# Patient Record
Sex: FEMALE | Race: WHITE | NOT HISPANIC OR LATINO | Employment: OTHER | URBAN - METROPOLITAN AREA
[De-identification: names, ages, dates, MRNs, and addresses within clinical notes are randomized per-mention and may not be internally consistent; named-entity substitution may affect disease eponyms.]

---

## 2018-03-14 ENCOUNTER — HOSPITAL ENCOUNTER (OUTPATIENT)
Dept: RADIOLOGY | Facility: OTHER | Age: 63
Discharge: HOME | End: 2018-03-14
Attending: OBSTETRICS & GYNECOLOGY
Payer: COMMERCIAL

## 2018-03-14 ENCOUNTER — OFFICE VISIT (OUTPATIENT)
Dept: OBSTETRICS AND GYNECOLOGY | Facility: CLINIC | Age: 63
End: 2018-03-14
Payer: COMMERCIAL

## 2018-03-14 VITALS
DIASTOLIC BLOOD PRESSURE: 82 MMHG | WEIGHT: 209.31 LBS | HEIGHT: 71 IN | SYSTOLIC BLOOD PRESSURE: 116 MMHG | BODY MASS INDEX: 29.3 KG/M2

## 2018-03-14 DIAGNOSIS — N95.0 POSTMENOPAUSAL BLEEDING: Primary | ICD-10-CM

## 2018-03-14 DIAGNOSIS — N95.0 POSTMENOPAUSAL BLEEDING: ICD-10-CM

## 2018-03-14 PROCEDURE — 99204 OFFICE O/P NEW MOD 45 MIN: CPT | Mod: S$GLB,,, | Performed by: OBSTETRICS & GYNECOLOGY

## 2018-03-14 PROCEDURE — 87624 HPV HI-RISK TYP POOLED RSLT: CPT

## 2018-03-14 PROCEDURE — 76856 US EXAM PELVIC COMPLETE: CPT | Mod: 26,,, | Performed by: INTERNAL MEDICINE

## 2018-03-14 PROCEDURE — 76830 TRANSVAGINAL US NON-OB: CPT | Mod: 26,,, | Performed by: INTERNAL MEDICINE

## 2018-03-14 PROCEDURE — 88175 CYTOPATH C/V AUTO FLUID REDO: CPT

## 2018-03-14 PROCEDURE — 76830 TRANSVAGINAL US NON-OB: CPT | Mod: TC

## 2018-03-14 RX ORDER — ESTRADIOL AND LEVONORGESTREL .045; .015 MG/D; MG/D
PATCH TRANSDERMAL WEEKLY
COMMUNITY
Start: 2018-03-11 | End: 2018-11-09 | Stop reason: SDUPTHER

## 2018-03-14 RX ORDER — TRAZODONE HYDROCHLORIDE 50 MG/1
TABLET ORAL NIGHTLY PRN
COMMUNITY
Start: 2018-03-13 | End: 2020-02-18 | Stop reason: SDUPTHER

## 2018-03-14 RX ORDER — LEVOTHYROXINE SODIUM 88 UG/1
TABLET ORAL
COMMUNITY
Start: 2018-03-13 | End: 2020-02-18 | Stop reason: SDUPTHER

## 2018-03-14 NOTE — PROGRESS NOTES
Subjective:       Patient ID: Sara David is a 62 y.o. female.    Chief Complaint:  Vaginal Bleeding      History of Present Illness:  HPI   61 y/o G0 postmenopausal female presents for evaluation of postmenopausal bleeding. Menopause occurred about 10 years ago. Reports that the bleeding occurred 5 nights ago and was heavy enough to bleed through a tampon/pad followed by a gush of clear fluid the following day. Has continued to spot since that time. Has been on climara patch since menopause. Does report a history of endometriosis, had one ovary removed. History of D&C for AUB in the  - was told she had polyps. Unsure when her last pap. One abnormal pap in the  - colpo was benign. Has not had intercourse for a while. Had the flu two weeks ago.     FH: Denies family history of breast, GYN or colon cancer.    GYN & OB History  No LMP recorded. Patient is postmenopausal.   Date of Last Pap: No result found    OB History    Para Term  AB Living   2       2     SAB TAB Ectopic Multiple Live Births                  # Outcome Date GA Lbr Brice/2nd Weight Sex Delivery Anes PTL Lv   2 AB            1 AB                   Review of Systems  Review of Systems   Constitutional: Negative for chills, diaphoresis, fatigue and fever.   Respiratory: Negative for cough and shortness of breath.    Cardiovascular: Negative for chest pain and palpitations.   Gastrointestinal: Negative for abdominal pain, constipation, diarrhea, nausea and vomiting.   Genitourinary: Positive for menstrual problem, vaginal discharge and postmenopausal bleeding. Negative for dyspareunia, pelvic pain, vaginal bleeding and vaginal pain.   Neurological: Negative for headaches.   Psychiatric/Behavioral: Negative for depression.           Objective:    Physical Exam:   Constitutional: She is oriented to person, place, and time. She appears well-developed and well-nourished. No distress.    HENT:   Head: Normocephalic and atraumatic.      Neck: Normal range of motion.     Pulmonary/Chest: Effort normal.          Genitourinary:   Genitourinary Comments: Normal external female genitalia; vagina atrophic with old blood in vault; cervix normal, no masses with mucus/old blood from os; uterus small mobile and nontender; no adnexal masses palpated.           Musculoskeletal: Normal range of motion and moves all extremeties.       Neurological: She is alert and oriented to person, place, and time.     Psychiatric: She has a normal mood and affect. Her behavior is normal. Judgment and thought content normal.          Assessment:        1. Postmenopausal bleeding             Plan:      Sara was seen today for vaginal bleeding.    Diagnoses and all orders for this visit:    Postmenopausal bleeding  - Pap/HPV testing done.   - Discussed possible causes of PMB including atrophy, benign polyp/fibroid, endometrial hyperplasia/malignancy.  - Recommended TVUS.   - Discussed will most likely need EMBx - offered today and patient declined.   -     Liquid-based pap smear, screening  -     HPV High Risk Genotypes, PCR  -     Cancel: US Pelvis Comp with Transvag NON-OB (xpd; Future  -     US Pelvis Comp with Transvag NON-OB (xpd; Future        Orders Placed This Encounter   Procedures    HPV High Risk Genotypes, PCR    US Pelvis Comp with Transvag NON-OB (xpd       Follow-up if symptoms worsen or fail to improve, for pending U/S results.        Meche Wynn MD  OB/GYN

## 2018-03-16 LAB
HPV16 AG SPEC QL: NEGATIVE
HPV16+18+H RISK 12 DNA CVX-IMP: NEGATIVE
HPV18 DNA SPEC QL NAA+PROBE: NEGATIVE

## 2018-03-19 ENCOUNTER — SURGICAL CONSULT (OUTPATIENT)
Dept: OBSTETRICS AND GYNECOLOGY | Facility: CLINIC | Age: 63
End: 2018-03-19
Payer: COMMERCIAL

## 2018-03-19 VITALS
HEIGHT: 71 IN | DIASTOLIC BLOOD PRESSURE: 80 MMHG | WEIGHT: 209.19 LBS | SYSTOLIC BLOOD PRESSURE: 115 MMHG | BODY MASS INDEX: 29.29 KG/M2

## 2018-03-19 DIAGNOSIS — N95.0 POSTMENOPAUSAL BLEEDING: Primary | ICD-10-CM

## 2018-03-19 LAB
B-HCG UR QL: NEGATIVE
CTP QC/QA: YES

## 2018-03-19 PROCEDURE — 99499 UNLISTED E&M SERVICE: CPT | Mod: S$GLB,,, | Performed by: OBSTETRICS & GYNECOLOGY

## 2018-03-19 PROCEDURE — 58100 BIOPSY OF UTERUS LINING: CPT | Mod: S$GLB,,, | Performed by: OBSTETRICS & GYNECOLOGY

## 2018-03-19 PROCEDURE — 88305 TISSUE EXAM BY PATHOLOGIST: CPT | Mod: 26,,,

## 2018-03-19 PROCEDURE — 88305 TISSUE EXAM BY PATHOLOGIST: CPT

## 2018-03-19 PROCEDURE — 81025 URINE PREGNANCY TEST: CPT | Mod: S$GLB,,, | Performed by: OBSTETRICS & GYNECOLOGY

## 2018-03-23 ENCOUNTER — TELEPHONE (OUTPATIENT)
Dept: OBSTETRICS AND GYNECOLOGY | Facility: CLINIC | Age: 63
End: 2018-03-23

## 2018-03-23 NOTE — TELEPHONE ENCOUNTER
----- Message from Veda Carrera sent at 3/23/2018  9:54 AM CDT -----  x_  1st Request  _  2nd Request  _  3rd Request        Who: carmelita    Why: pt. Stating she is still bleeding since procedure. Please call to discuss    What Number to Call Back:[phone]    When to Expect a call back: (Within 24 hours)

## 2018-03-23 NOTE — TELEPHONE ENCOUNTER
Told Patient that its normal to bleed after the EMBX procedure . Patient stated that she is not bleeding throught out pad within an hour. Patient is not experiencing dizziness . Patient spoke with

## 2018-03-27 DIAGNOSIS — N95.0 POSTMENOPAUSAL BLEEDING: Primary | ICD-10-CM

## 2018-04-05 ENCOUNTER — TELEPHONE (OUTPATIENT)
Dept: OBSTETRICS AND GYNECOLOGY | Facility: CLINIC | Age: 63
End: 2018-04-05

## 2018-04-05 NOTE — TELEPHONE ENCOUNTER
----- Message from Meche Wynn MD sent at 4/5/2018  2:30 PM CDT -----  Yes, if she is fine with the surgery on April 13, she needs preop and consent appt with me prior to that. Let me know if she needs to talk to me before. Thanks!  ----- Message -----  From: Harika Freitas MA  Sent: 4/5/2018   2:24 PM  To: Meche Wynn MD    Pt has not had pre op yet, would you like this scheduled?  ----- Message -----  From: Veda Carrera  Sent: 4/5/2018   1:13 PM  To: [facility] Staff      Patient name:carmelita      Reason:pt. Would like to speak with dr. Wynn regarding preop. Please call to discuss      Callback number:[phone]

## 2018-04-05 NOTE — TELEPHONE ENCOUNTER
Called pt, she answered the phone and hung up. Need to see if her surgery date is ok. Then need to schedule pre op and pre admit appt.

## 2018-04-06 ENCOUNTER — TELEPHONE (OUTPATIENT)
Dept: OBSTETRICS AND GYNECOLOGY | Facility: CLINIC | Age: 63
End: 2018-04-06

## 2018-04-06 NOTE — TELEPHONE ENCOUNTER
----- Message from Corrine Caruso sent at 4/6/2018 10:22 AM CDT -----  Contact: pt  x_ 1st Request  _ 2nd Request  _ 3rd Request    Who: pt    Why: is needing to schedule her surgery date    What Number to Call Back: [phone]    When to Expect a call back: (Before the end of the day)  -- if call after 3:00 call back will be tomorrow.

## 2018-04-06 NOTE — TELEPHONE ENCOUNTER
Attempted to contact the pt per Dr. Wynn to inquire if 4/13 will work as a Sx date. No answer, left VM message for the pt to call the clinic back.

## 2018-04-06 NOTE — TELEPHONE ENCOUNTER
Contacted the pt to inquire if she can have her Sx on 4/13. Pt stated that she could. Pt scheduled for pre-op with Dr. Wynn on 4/11 at 9:45AM and anesthesia on 4/11 at 2:30PM. Pt informed of the appointment locations, dates, and times. Pt informed that letter reminders will be sent out. Pt verbalized understanding. Letters sent out.    Pt agreed to 4/13 for Sx. Pre-op and anesthesia scheduled. Can you place the orders?

## 2018-04-11 ENCOUNTER — OFFICE VISIT (OUTPATIENT)
Dept: OBSTETRICS AND GYNECOLOGY | Facility: CLINIC | Age: 63
End: 2018-04-11
Payer: COMMERCIAL

## 2018-04-11 ENCOUNTER — HOSPITAL ENCOUNTER (OUTPATIENT)
Dept: PREADMISSION TESTING | Facility: OTHER | Age: 63
Discharge: HOME | End: 2018-04-11
Attending: OBSTETRICS & GYNECOLOGY
Payer: COMMERCIAL

## 2018-04-11 ENCOUNTER — ANESTHESIA EVENT (OUTPATIENT)
Dept: SURGERY | Facility: OTHER | Age: 63
End: 2018-04-11
Payer: COMMERCIAL

## 2018-04-11 VITALS
WEIGHT: 211 LBS | SYSTOLIC BLOOD PRESSURE: 121 MMHG | BODY MASS INDEX: 29.54 KG/M2 | TEMPERATURE: 99 F | HEIGHT: 71 IN | OXYGEN SATURATION: 97 % | HEART RATE: 90 BPM | DIASTOLIC BLOOD PRESSURE: 73 MMHG

## 2018-04-11 VITALS
BODY MASS INDEX: 29.56 KG/M2 | WEIGHT: 211.19 LBS | DIASTOLIC BLOOD PRESSURE: 73 MMHG | SYSTOLIC BLOOD PRESSURE: 121 MMHG | HEIGHT: 71 IN

## 2018-04-11 DIAGNOSIS — N95.0 POSTMENOPAUSAL BLEEDING: Primary | ICD-10-CM

## 2018-04-11 LAB
BASOPHILS # BLD AUTO: 0.03 K/UL
BASOPHILS NFR BLD: 0.4 %
DIFFERENTIAL METHOD: ABNORMAL
EOSINOPHIL # BLD AUTO: 0.2 K/UL
EOSINOPHIL NFR BLD: 2.5 %
ERYTHROCYTE [DISTWIDTH] IN BLOOD BY AUTOMATED COUNT: 13.7 %
HCT VFR BLD AUTO: 43.7 %
HGB BLD-MCNC: 14.6 G/DL
LYMPHOCYTES # BLD AUTO: 1.9 K/UL
LYMPHOCYTES NFR BLD: 24 %
MCH RBC QN AUTO: 31.9 PG
MCHC RBC AUTO-ENTMCNC: 33.4 G/DL
MCV RBC AUTO: 95 FL
MONOCYTES # BLD AUTO: 0.6 K/UL
MONOCYTES NFR BLD: 7.1 %
NEUTROPHILS # BLD AUTO: 5.2 K/UL
NEUTROPHILS NFR BLD: 65.4 %
PLATELET # BLD AUTO: 287 K/UL
PMV BLD AUTO: 9.2 FL
RBC # BLD AUTO: 4.58 M/UL
WBC # BLD AUTO: 7.93 K/UL

## 2018-04-11 PROCEDURE — 99499 UNLISTED E&M SERVICE: CPT | Mod: S$GLB,,, | Performed by: OBSTETRICS & GYNECOLOGY

## 2018-04-11 PROCEDURE — 85025 COMPLETE CBC W/AUTO DIFF WBC: CPT

## 2018-04-11 PROCEDURE — 36415 COLL VENOUS BLD VENIPUNCTURE: CPT

## 2018-04-11 RX ORDER — PREGABALIN 75 MG/1
150 CAPSULE ORAL
Status: DISCONTINUED | OUTPATIENT
Start: 2018-04-11 | End: 2018-04-12 | Stop reason: HOSPADM

## 2018-04-11 RX ORDER — LIDOCAINE HYDROCHLORIDE 10 MG/ML
0.5 INJECTION, SOLUTION EPIDURAL; INFILTRATION; INTRACAUDAL; PERINEURAL ONCE
Status: CANCELLED | OUTPATIENT
Start: 2018-04-11 | End: 2018-04-11

## 2018-04-11 RX ORDER — MIDAZOLAM HYDROCHLORIDE 5 MG/ML
2 INJECTION INTRAMUSCULAR; INTRAVENOUS
Status: CANCELLED | OUTPATIENT
Start: 2018-04-11 | End: 2018-04-11

## 2018-04-11 RX ORDER — CETIRIZINE HYDROCHLORIDE 10 MG/1
10 TABLET ORAL DAILY
COMMUNITY

## 2018-04-11 RX ORDER — SODIUM CHLORIDE, SODIUM LACTATE, POTASSIUM CHLORIDE, CALCIUM CHLORIDE 600; 310; 30; 20 MG/100ML; MG/100ML; MG/100ML; MG/100ML
INJECTION, SOLUTION INTRAVENOUS CONTINUOUS
Status: CANCELLED | OUTPATIENT
Start: 2018-04-11

## 2018-04-11 NOTE — PROGRESS NOTES
History & Physical  Gynecology      SUBJECTIVE:     Chief Complaint: Pre-op Exam       History of Present Illness:  61 y/o postmenopausal female presents for surgery workup for hysteroscopy/D&C scheduled for  for postmenopausal bleeding. Patient had continued bleeding for a few weeks after EMBx. Patient reports bleeding has now stopped. EMBx benign.     TVUS:  Transabdominal and transvaginal images were obtained.  The uterus measures 8.5 x 4.2 x 5.8 cm, within the normal range.  The endometrial stripe is 8 mm, abnormal.  The uterus demonstrates 3 fibroids.  Along the posterior left body of the uterus is a 1.4 x 1.1 x 1.5 cm subserosal fibroid.  There is a 2.6 x 2.4 x 2.6 cm subserosal fibroid along the left body of the uterus.  Finally, a subcentimeter intramural fibroid is present also along the posterior uterus.  There is some minimal fluid within the cervical canal.  There is a lobulated region of tissue projecting into the fluid .  This contains some internal flow.    Ovaries: The right ovary has a normal size and appearance and measures 2.3 x 2.3 x 1.2 cm.  The left ovary is not visualized.    Impression:  In this postmenopausal woman with bleeding the endometrial stripe is thickened measuring 8 mm.    There is some lobulated tissue within the cervical canal projecting into a small amount of fluid.  It contains some internal vascularity, raising possibility of a polyp.    FINAL PATHOLOGIC DIAGNOSIS  ENDOMETRIAL TISSUE WITH FOCAL GLANDULAR AND STROMAL BREAKDOWN.  NO MALIGNANCY IDENTIFIED.    Review of patient's allergies indicates:   Allergen Reactions    Iodinated contrast- oral and iv dye        Past Medical History:   Diagnosis Date    Endometriosis     Miscarriage     Thyroid disease      Past Surgical History:   Procedure Laterality Date    BACK SURGERY      DILATION AND CURETTAGE OF UTERUS      History of polyps    SALPINGOOPHORECTOMY      WISDOM TOOTH EXTRACTION       OB History       Para Term  AB Living    2       2      SAB TAB Ectopic Multiple Live Births                     Family History   Problem Relation Age of Onset    Hypertension Father     Breast cancer Sister     Bone cancer Sister     Lymphoma Maternal Aunt     Colon cancer Neg Hx     Diabetes Neg Hx     Miscarriages / Stillbirths Neg Hx     Stroke Neg Hx      Social History   Substance Use Topics    Smoking status: Never Smoker    Smokeless tobacco: Never Used    Alcohol use Yes      Comment: 2-3 times per week       Current Outpatient Prescriptions   Medication Sig    CLIMARA PRO 0.045-0.015 mg/24 hr Place onto the skin once a week.     levothyroxine (SYNTHROID) 88 MCG tablet before breakfast.     traZODone (DESYREL) 50 MG tablet nightly as needed.     cetirizine (ZYRTEC) 10 MG tablet Take 10 mg by mouth once daily.     No current facility-administered medications for this visit.          Review of Systems:  Review of Systems   Constitutional: Negative for chills, fatigue, fever and unexpected weight change.   Respiratory: Negative for cough, shortness of breath and wheezing.    Cardiovascular: Negative for chest pain and palpitations.   Gastrointestinal: Negative for abdominal pain, constipation, diarrhea, nausea and vomiting.   Genitourinary: Positive for postmenopausal bleeding. Negative for dyspareunia, hematuria, menorrhagia, pelvic pain, vaginal bleeding, vaginal discharge and vaginal pain.   Musculoskeletal: Negative for myalgias.   Neurological: Negative for syncope and headaches.   Hematological: Negative for adenopathy.   Psychiatric/Behavioral: The patient is not nervous/anxious.         OBJECTIVE:     Physical Exam:  Physical Exam   Constitutional: She is oriented to person, place, and time. She appears well-developed and well-nourished. No distress.   HENT:   Head: Normocephalic and atraumatic.   Neck: Normal range of motion.   Cardiovascular: Normal rate, regular rhythm and normal heart sounds.   Exam reveals no gallop and no friction rub.    No murmur heard.  Pulmonary/Chest: Effort normal and breath sounds normal. No respiratory distress. She has no wheezes. She has no rales.   Abdominal: Soft. She exhibits no distension and no mass. There is no tenderness. There is no rebound and no guarding. No hernia.   Genitourinary:   Genitourinary Comments: See prior notes     Musculoskeletal: Normal range of motion. She exhibits no edema.   Neurological: She is alert and oriented to person, place, and time.   Psychiatric: She has a normal mood and affect. Her behavior is normal. Judgment and thought content normal.         ASSESSMENT:       ICD-10-CM ICD-9-CM    1. Postmenopausal bleeding N95.0 627.1 Notify Physician/Vital Signs Parameters Urine output less than 0.5mL/kg/hr (with indwelling catheter) or 30 mL/hr (without indwelling catheter) or blood glucose greater than 200 mg/dL      Notify physician      Notify Physician - Potential Need of Opioid Reversal      Place in Outpatient      Vital signs      Pulse Oximetry Q4H      Up ad nicholas      Place JASON hose      Place sequential compression device      Diet NPO          Plan:      Sara was seen today for pre-op exam.    Diagnoses and all orders for this visit:    Postmenopausal bleeding  - To OR for Hscope/D&C on 4/13 - suspect polyp.   - Consents signed. Risks, benefits, alternatives discussed.  - Orders placed, to preop today.   -     Notify Physician/Vital Signs Parameters Urine output less than 0.5mL/kg/hr (with indwelling catheter) or 30 mL/hr (without indwelling catheter) or blood glucose greater than 200 mg/dL; Standing  -     Notify physician; Standing  -     Notify Physician - Potential Need of Opioid Reversal; Standing  -     Place in Outpatient; Standing  -     Vital signs; Standing  -     Pulse Oximetry Q4H; Standing  -     Up ad nicholas; Standing  -     Place JASON hose; Standing  -     Place sequential compression device; Standing  -     Diet NPO;  Standing    Other orders  -     IP VTE LOW RISK PATIENT; Standing    No orders of the defined types were placed in this encounter.      Follow-up in about 6 weeks (around 5/23/2018) for post op.    Counseling time: 15 minutes    Meche Wynn

## 2018-04-11 NOTE — ANESTHESIA PREPROCEDURE EVALUATION
04/11/2018  Sara David is a 62 y.o., female.    Anesthesia Evaluation    I have reviewed the Patient Summary Reports.    I have reviewed the Nursing Notes.   I have reviewed the Medications.     Review of Systems  Anesthesia Hx:  No problems with previous Anesthesia  History of prior surgery of interest to airway management or planning: Denies Family Hx of Anesthesia complications.   Denies Personal Hx of Anesthesia complications.   Social:  Non-Smoker    Hematology/Oncology:  Hematology Normal   Oncology Normal     EENT/Dental:EENT/Dental Normal   Cardiovascular:  Cardiovascular Normal     Pulmonary:  Pulmonary Normal    Renal/:  Renal/ Normal     Hepatic/GI:  Hepatic/GI Normal    Musculoskeletal:  Musculoskeletal Normal    Neurological:  Neurology Normal    Endocrine:   Hypothyroidism    Dermatological:  Skin Normal    Psych:  Psychiatric Normal           Physical Exam  General:  Well nourished    Airway/Jaw/Neck:  Airway Findings: Mouth Opening: Normal Tongue: Normal  Mallampati: I      Dental:  Dental Findings: molar caps        Mental Status:  Mental Status Findings:  Cooperative, Alert and Oriented         Anesthesia Plan  Type of Anesthesia, risks & benefits discussed:  Anesthesia Type:  general  Patient's Preference:   Intra-op Monitoring Plan:   Intra-op Monitoring Plan Comments:   Post Op Pain Control Plan:   Post Op Pain Control Plan Comments:   Induction:   IV  Beta Blocker:         Informed Consent: Patient understands risks and agrees with Anesthesia plan.  Questions answered. Anesthesia consent signed with patient.  ASA Score: 2     Day of Surgery Review of History & Physical:    H&P update referred to the surgeon.     Anesthesia Plan Notes: CBC today        Ready For Surgery From Anesthesia Perspective.

## 2018-04-11 NOTE — DISCHARGE INSTRUCTIONS
PRE-ADMIT TESTING -  [phone]    [address]  [place] [facility]          Your surgery has been scheduled at [facility]. We are pleased to have the opportunity to serve you. For Further Information please call [phone].    On the day of surgery please report to the Information Desk on the 1st floor.    · CONTACT YOUR PHYSICIAN'S OFFICE THE DAY PRIOR TO YOUR SURGERY TO OBTAIN YOUR ARRIVAL TIME.     · The evening before surgery do not eat anything after 9 p.m. ( this includes hard candy, chewing gum and mints).  You may only have GATORADE, POWERADE AND WATER  from 9 p.m. until you leave your home.   DO NOT DRINK ANY LIQUIDS ON THE WAY TO THE HOSPITAL.      SPECIAL MEDICATION INSTRUCTIONS: TAKE medications checked off by the Anesthesiologist on your Medication List.    Angiogram Patients: Take medications as instructed by your physician, including aspirin.     Surgery Patients:    If you take ASPIRIN - Your PHYSICIAN/SURGEON will need to inform you IF/OR when you need to stop taking aspirin prior to your surgery.     Do Not take any medications containing IBUPROFEN.  Do Not Wear any make-up or dark nail polish   (especially eye make-up) to surgery. If you come to surgery with makeup on you will be required to remove the makeup or nail polish.    Do not shave your surgical area at least 5 days prior to your surgery. The surgical prep will be performed at the hospital according to Infection Control regulations.    Leave all valuables at home.   Do Not wear any jewelry or watches, including any metal in body piercings.  Contact Lens must be removed before surgery. Either do not wear the contact lens or bring a case and solution for storage.  Please bring a container for eyeglasses or dentures as required.  Bring any paperwork your physician has provided, such as consent forms,  history and physicals, doctor's orders, etc.   Bring comfortable clothes that are loose fitting to wear upon  discharge. Take into consideration the type of surgery being performed.  Maintain your diet as advised per your physician the day prior to surgery.      Adequate rest the night before surgery is advised.   Park in the Parking lot behind the hospital or in the [facility] across the street from the parking lot. Parking is complimentary.  If you will be discharged the same day as your procedure, please arrange for a responsible adult to drive you home or to accompany you if traveling by taxi.   YOU WILL NOT BE PERMITTED TO DRIVE OR TO LEAVE THE HOSPITAL ALONE AFTER SURGERY.   It is strongly recommended that you arrange for someone to remain with you for the first 24 hrs following your surgery.       Thank you for your cooperation.  The Staff of [facility].        Bathing Instructions                                                                 Please shower the evening before and morning of your procedure with    ANTIBACTERIAL SOAP. ( DIAL, etc )  Concentrate on the surgical area   for at least 3 minutes and rinse completely. Dry off as usual.   Do not use any deodorant, powder, body lotions, perfume, after shave or    cologne.

## 2018-04-12 ENCOUNTER — TELEPHONE (OUTPATIENT)
Dept: OBSTETRICS AND GYNECOLOGY | Facility: CLINIC | Age: 63
End: 2018-04-12

## 2018-04-12 NOTE — TELEPHONE ENCOUNTER
Called patient in regards to surgery tomorrow. Told patient to be there 2 hours before surgery.Patient verbalized and understand.

## 2018-04-13 ENCOUNTER — HOSPITAL ENCOUNTER (OUTPATIENT)
Facility: OTHER | Age: 63
Discharge: HOME | End: 2018-04-13
Attending: OBSTETRICS & GYNECOLOGY | Admitting: OBSTETRICS & GYNECOLOGY
Payer: COMMERCIAL

## 2018-04-13 ENCOUNTER — ANESTHESIA (OUTPATIENT)
Dept: SURGERY | Facility: OTHER | Age: 63
End: 2018-04-13
Payer: COMMERCIAL

## 2018-04-13 ENCOUNTER — SURGERY (OUTPATIENT)
Age: 63
End: 2018-04-13

## 2018-04-13 VITALS
OXYGEN SATURATION: 100 % | HEART RATE: 69 BPM | RESPIRATION RATE: 18 BRPM | HEIGHT: 71 IN | TEMPERATURE: 98 F | WEIGHT: 211 LBS | DIASTOLIC BLOOD PRESSURE: 67 MMHG | SYSTOLIC BLOOD PRESSURE: 124 MMHG | BODY MASS INDEX: 29.54 KG/M2

## 2018-04-13 DIAGNOSIS — Z98.890 S/P D&C (STATUS POST DILATION AND CURETTAGE): Primary | ICD-10-CM

## 2018-04-13 DIAGNOSIS — N95.0 POSTMENOPAUSAL BLEEDING: ICD-10-CM

## 2018-04-13 PROCEDURE — 71000015 HC POSTOP RECOV 1ST HR: Performed by: OBSTETRICS & GYNECOLOGY

## 2018-04-13 PROCEDURE — 36000706: Performed by: OBSTETRICS & GYNECOLOGY

## 2018-04-13 PROCEDURE — 37000009 HC ANESTHESIA EA ADD 15 MINS: Performed by: OBSTETRICS & GYNECOLOGY

## 2018-04-13 PROCEDURE — 71000033 HC RECOVERY, INTIAL HOUR: Performed by: OBSTETRICS & GYNECOLOGY

## 2018-04-13 PROCEDURE — 63600175 PHARM REV CODE 636 W HCPCS: Performed by: ANESTHESIOLOGY

## 2018-04-13 PROCEDURE — 25000003 PHARM REV CODE 250: Performed by: NURSE ANESTHETIST, CERTIFIED REGISTERED

## 2018-04-13 PROCEDURE — 36000707: Performed by: OBSTETRICS & GYNECOLOGY

## 2018-04-13 PROCEDURE — 25000003 PHARM REV CODE 250: Performed by: ANESTHESIOLOGY

## 2018-04-13 PROCEDURE — 27201423 OPTIME MED/SURG SUP & DEVICES STERILE SUPPLY: Performed by: OBSTETRICS & GYNECOLOGY

## 2018-04-13 PROCEDURE — 58561 HYSTEROSCOPY REMOVE MYOMA: CPT | Mod: ,,, | Performed by: OBSTETRICS & GYNECOLOGY

## 2018-04-13 PROCEDURE — 63600175 PHARM REV CODE 636 W HCPCS: Performed by: NURSE ANESTHETIST, CERTIFIED REGISTERED

## 2018-04-13 PROCEDURE — 71000016 HC POSTOP RECOV ADDL HR: Performed by: OBSTETRICS & GYNECOLOGY

## 2018-04-13 PROCEDURE — 37000008 HC ANESTHESIA 1ST 15 MINUTES: Performed by: OBSTETRICS & GYNECOLOGY

## 2018-04-13 PROCEDURE — C1782 MORCELLATOR: HCPCS | Performed by: OBSTETRICS & GYNECOLOGY

## 2018-04-13 RX ORDER — KETOROLAC TROMETHAMINE 30 MG/ML
INJECTION, SOLUTION INTRAMUSCULAR; INTRAVENOUS
Status: DISCONTINUED | OUTPATIENT
Start: 2018-04-13 | End: 2018-04-13

## 2018-04-13 RX ORDER — FENTANYL CITRATE 50 UG/ML
INJECTION, SOLUTION INTRAMUSCULAR; INTRAVENOUS
Status: DISCONTINUED | OUTPATIENT
Start: 2018-04-13 | End: 2018-04-13

## 2018-04-13 RX ORDER — FENTANYL CITRATE 50 UG/ML
25 INJECTION, SOLUTION INTRAMUSCULAR; INTRAVENOUS EVERY 5 MIN PRN
Status: DISCONTINUED | OUTPATIENT
Start: 2018-04-13 | End: 2018-04-13 | Stop reason: HOSPADM

## 2018-04-13 RX ORDER — HYDROCODONE BITARTRATE AND ACETAMINOPHEN 5; 325 MG/1; MG/1
1 TABLET ORAL EVERY 4 HOURS PRN
Qty: 14 TABLET | Refills: 0 | Status: SHIPPED | OUTPATIENT
Start: 2018-04-13 | End: 2018-11-03 | Stop reason: ALTCHOICE

## 2018-04-13 RX ORDER — DIPHENHYDRAMINE HYDROCHLORIDE 50 MG/ML
25 INJECTION INTRAMUSCULAR; INTRAVENOUS EVERY 4 HOURS PRN
Status: DISCONTINUED | OUTPATIENT
Start: 2018-04-13 | End: 2018-04-13 | Stop reason: HOSPADM

## 2018-04-13 RX ORDER — SODIUM CHLORIDE, SODIUM LACTATE, POTASSIUM CHLORIDE, CALCIUM CHLORIDE 600; 310; 30; 20 MG/100ML; MG/100ML; MG/100ML; MG/100ML
INJECTION, SOLUTION INTRAVENOUS CONTINUOUS
Status: DISCONTINUED | OUTPATIENT
Start: 2018-04-13 | End: 2018-04-13 | Stop reason: HOSPADM

## 2018-04-13 RX ORDER — IBUPROFEN 800 MG/1
800 TABLET ORAL EVERY 8 HOURS PRN
Qty: 60 TABLET | Refills: 1 | Status: SHIPPED | OUTPATIENT
Start: 2018-04-13 | End: 2018-11-03 | Stop reason: ALTCHOICE

## 2018-04-13 RX ORDER — LIDOCAINE HCL/PF 100 MG/5ML
SYRINGE (ML) INTRAVENOUS
Status: DISCONTINUED | OUTPATIENT
Start: 2018-04-13 | End: 2018-04-13

## 2018-04-13 RX ORDER — ACETAMINOPHEN 10 MG/ML
INJECTION, SOLUTION INTRAVENOUS
Status: DISCONTINUED | OUTPATIENT
Start: 2018-04-13 | End: 2018-04-13

## 2018-04-13 RX ORDER — DEXAMETHASONE SODIUM PHOSPHATE 4 MG/ML
INJECTION, SOLUTION INTRA-ARTICULAR; INTRALESIONAL; INTRAMUSCULAR; INTRAVENOUS; SOFT TISSUE
Status: DISCONTINUED | OUTPATIENT
Start: 2018-04-13 | End: 2018-04-13

## 2018-04-13 RX ORDER — MIDAZOLAM HYDROCHLORIDE 1 MG/ML
INJECTION INTRAMUSCULAR; INTRAVENOUS
Status: DISCONTINUED | OUTPATIENT
Start: 2018-04-13 | End: 2018-04-13

## 2018-04-13 RX ORDER — ONDANSETRON 8 MG/1
8 TABLET, ORALLY DISINTEGRATING ORAL EVERY 8 HOURS PRN
Status: DISCONTINUED | OUTPATIENT
Start: 2018-04-13 | End: 2018-04-13 | Stop reason: HOSPADM

## 2018-04-13 RX ORDER — SODIUM CHLORIDE 0.9 % (FLUSH) 0.9 %
3 SYRINGE (ML) INJECTION
Status: DISCONTINUED | OUTPATIENT
Start: 2018-04-13 | End: 2018-04-13 | Stop reason: HOSPADM

## 2018-04-13 RX ORDER — LIDOCAINE HYDROCHLORIDE 10 MG/ML
0.5 INJECTION INFILTRATION; PERINEURAL ONCE
Status: DISCONTINUED | OUTPATIENT
Start: 2018-04-13 | End: 2018-04-13 | Stop reason: HOSPADM

## 2018-04-13 RX ORDER — IBUPROFEN 600 MG/1
600 TABLET ORAL EVERY 6 HOURS
Status: DISCONTINUED | OUTPATIENT
Start: 2018-04-13 | End: 2018-04-13 | Stop reason: HOSPADM

## 2018-04-13 RX ORDER — OXYCODONE HYDROCHLORIDE 5 MG/1
5 TABLET ORAL ONCE
Status: DISCONTINUED | OUTPATIENT
Start: 2018-04-13 | End: 2018-04-13 | Stop reason: HOSPADM

## 2018-04-13 RX ORDER — PROPOFOL 10 MG/ML
VIAL (ML) INTRAVENOUS
Status: DISCONTINUED | OUTPATIENT
Start: 2018-04-13 | End: 2018-04-13

## 2018-04-13 RX ORDER — OXYCODONE HYDROCHLORIDE 5 MG/1
5 TABLET ORAL
Status: DISCONTINUED | OUTPATIENT
Start: 2018-04-13 | End: 2018-04-13 | Stop reason: HOSPADM

## 2018-04-13 RX ORDER — MIDAZOLAM HYDROCHLORIDE 5 MG/ML
2 INJECTION INTRAMUSCULAR; INTRAVENOUS
Status: DISCONTINUED | OUTPATIENT
Start: 2018-04-13 | End: 2018-04-13 | Stop reason: HOSPADM

## 2018-04-13 RX ORDER — DIPHENHYDRAMINE HCL 25 MG
25 CAPSULE ORAL EVERY 4 HOURS PRN
Status: DISCONTINUED | OUTPATIENT
Start: 2018-04-13 | End: 2018-04-13 | Stop reason: HOSPADM

## 2018-04-13 RX ORDER — MEPERIDINE HYDROCHLORIDE 50 MG/ML
12.5 INJECTION INTRAMUSCULAR; INTRAVENOUS; SUBCUTANEOUS ONCE AS NEEDED
Status: DISCONTINUED | OUTPATIENT
Start: 2018-04-13 | End: 2018-04-13 | Stop reason: HOSPADM

## 2018-04-13 RX ORDER — ONDANSETRON 2 MG/ML
4 INJECTION INTRAMUSCULAR; INTRAVENOUS DAILY PRN
Status: DISCONTINUED | OUTPATIENT
Start: 2018-04-13 | End: 2018-04-13 | Stop reason: HOSPADM

## 2018-04-13 RX ORDER — ONDANSETRON 2 MG/ML
INJECTION INTRAMUSCULAR; INTRAVENOUS
Status: DISCONTINUED | OUTPATIENT
Start: 2018-04-13 | End: 2018-04-13

## 2018-04-13 RX ORDER — HYDROCODONE BITARTRATE AND ACETAMINOPHEN 5; 325 MG/1; MG/1
1 TABLET ORAL EVERY 4 HOURS PRN
Status: DISCONTINUED | OUTPATIENT
Start: 2018-04-13 | End: 2018-04-13 | Stop reason: HOSPADM

## 2018-04-13 RX ADMIN — DEXAMETHASONE SODIUM PHOSPHATE 8 MG: 4 INJECTION, SOLUTION INTRAMUSCULAR; INTRAVENOUS at 01:04

## 2018-04-13 RX ADMIN — KETOROLAC TROMETHAMINE 30 MG: 30 INJECTION, SOLUTION INTRAMUSCULAR; INTRAVENOUS at 02:04

## 2018-04-13 RX ADMIN — FENTANYL CITRATE 25 MCG: 50 INJECTION, SOLUTION INTRAMUSCULAR; INTRAVENOUS at 02:04

## 2018-04-13 RX ADMIN — LIDOCAINE HYDROCHLORIDE 75 MG: 20 INJECTION, SOLUTION INTRAVENOUS at 01:04

## 2018-04-13 RX ADMIN — CARBOXYMETHYLCELLULOSE SODIUM 2 DROP: 2.5 SOLUTION/ DROPS OPHTHALMIC at 01:04

## 2018-04-13 RX ADMIN — MIDAZOLAM HYDROCHLORIDE 2 MG: 1 INJECTION, SOLUTION INTRAMUSCULAR; INTRAVENOUS at 12:04

## 2018-04-13 RX ADMIN — SODIUM CHLORIDE, SODIUM LACTATE, POTASSIUM CHLORIDE, AND CALCIUM CHLORIDE: 600; 310; 30; 20 INJECTION, SOLUTION INTRAVENOUS at 12:04

## 2018-04-13 RX ADMIN — FENTANYL CITRATE 50 MCG: 50 INJECTION, SOLUTION INTRAMUSCULAR; INTRAVENOUS at 01:04

## 2018-04-13 RX ADMIN — PROPOFOL 30 MG: 10 INJECTION, EMULSION INTRAVENOUS at 01:04

## 2018-04-13 RX ADMIN — ONDANSETRON 4 MG: 2 INJECTION INTRAMUSCULAR; INTRAVENOUS at 01:04

## 2018-04-13 RX ADMIN — OXYCODONE HYDROCHLORIDE 5 MG: 5 TABLET ORAL at 02:04

## 2018-04-13 RX ADMIN — ACETAMINOPHEN 1000 MG: 10 INJECTION, SOLUTION INTRAVENOUS at 01:04

## 2018-04-13 RX ADMIN — PROPOFOL 150 MG: 10 INJECTION, EMULSION INTRAVENOUS at 01:04

## 2018-04-13 NOTE — ANESTHESIA POSTPROCEDURE EVALUATION
"Anesthesia Post Evaluation    Patient: Sara David    Procedure(s) Performed: Procedure(s) (LRB):  [id number] (N/A)    Final Anesthesia Type: general  Patient location during evaluation: PACU  Patient participation: Yes- Able to Participate  Level of consciousness: awake and alert  Post-procedure vital signs: reviewed and stable  Pain management: adequate  Airway patency: patent  PONV status at discharge: No PONV  Anesthetic complications: no      Cardiovascular status: blood pressure returned to baseline  Respiratory status: unassisted, spontaneous ventilation and room air  Hydration status: euvolemic  Follow-up not needed.        Visit Vitals  BP (!) 151/83   Pulse (!) 55   Temp 36.4 °C (97.6 °F) (Oral)   Resp 16   Ht 5' 11" (1.803 m)   Wt 95.7 kg (211 lb)   LMP  (LMP Unknown)   SpO2 99%   Breastfeeding? No   BMI 29.43 kg/m²       Pain/Caridad Score: Pain Assessment Performed: Yes (4/13/2018  2:51 PM)  Presence of Pain: complains of pain/discomfort (4/13/2018  2:51 PM)  Pain Rating Prior to Med Admin: 5 (4/13/2018  2:51 PM)  Pain Rating Post Med Admin: 0 (4/13/2018  2:39 PM)  Caridad Score: 10 (4/13/2018  2:45 PM)      "

## 2018-04-13 NOTE — BRIEF OP NOTE
[facility]  Brief Operative Note     SUMMARY     Surgery Date: 4/13/2018     Surgeon(s) and Role:     * Meche Wynn MD - Primary    Assisting Surgeon: aDmion Gill MD - PGY-1    Pre-op Diagnosis:  Postmenopausal bleeding [N95.0]    Post-op Diagnosis:  Post-Op Diagnosis Codes:     * Postmenopausal bleeding [N95.0]    Procedure(s) (LRB):  [id number] (N/A)    Anesthesia: General    Findings:   1. Uterus sounded to 8 cm  2. Cervix did NOT descend well with gentle traction on the single-tooth tenaculum   3. Cervical os dilated to 5 [place] by the Lee dilators  4. Hysteroscopic findings include: Uterine cavity with large fibroid-appearing mass projecting into the cavity from the anterior wall. Redundant endometrial tissue at bilateral cornua obstructing the view of the ostia bilaterally   5. Sharp Curettage performed and all specimens sent to pathology   6. Hemostasis obtained at the end of the procedure  7. Total Fluid: 860mL. Fluid deficit 50mL.    Estimated Blood Loss: Minimal         Specimens:   Specimen (12h ago through future)    Start     Ordered    04/13/18 1402  Specimen to Pathology - Surgery  Once     Comments:  Endometrial curettage      04/13/18 1402          Discharge Note    SUMMARY     Admit Date: 4/13/2018    Discharge Date and Time:  04/13/2018 2:31 PM    Hospital Course (synopsis of major diagnoses, care, treatment, and services provided during the course of the hospital stay):   Patient was admitted for the above mentioned procedure.  Procedure was performed without difficulty.  Please see operative report for further details.  She was transferred to recovery in stable condition and discharged home the same day.      Final Diagnosis: Post-Op Diagnosis Codes:     * Postmenopausal bleeding [N95.0]    Disposition: Home or Self Care    Follow Up/Patient Instructions:     Medications:  Reconciled Home Medications:      Medication List      START taking these  medications    hydrocodone-acetaminophen 5-325mg 5-325 mg per tablet  Commonly known as:  NORCO  Take 1 tablet by mouth every 4 (four) hours as needed for Pain.     ibuprofen 800 MG tablet  Commonly known as:  ADVIL,MOTRIN  Take 1 tablet (800 mg total) by mouth every 8 (eight) hours as needed for Pain.        CONTINUE taking these medications    cetirizine 10 MG tablet  Commonly known as:  ZYRTEC  Take 10 mg by mouth once daily.     CLIMARA PRO 0.045-0.015 mg/24 hr  Generic drug:  estradiol-levonorgestrel  Place onto the skin once a week.     levothyroxine 88 MCG tablet  Commonly known as:  SYNTHROID  before breakfast.     traZODone 50 MG tablet  Commonly known as:  DESYREL  nightly as needed.            Discharge Procedure Orders  Diet general     Activity as tolerated   Order Comments: Nothing in the vagina for 2 weeks - No douching, tampons, or sex.     Call MD for:  temperature >100.4     Call MD for:  severe uncontrolled pain     Call MD for:   Order Comments: Heavy vaginal bleeding, soaking though more than 1 heavy pad per hour.    You may expect some vaginal spotting or bleeding for the next 1-2 weeks.       Follow-up Information     Meche Wynn MD In 2 weeks.    Specialty:  Obstetrics and Gynecology  Why:  Post-Op Visit  Contact information:  [address] [facility] [address] [phone]

## 2018-04-13 NOTE — H&P (VIEW-ONLY)
History & Physical  Gynecology      SUBJECTIVE:     Chief Complaint: Pre-op Exam       History of Present Illness:  63 y/o postmenopausal female presents for surgery workup for hysteroscopy/D&C scheduled for  for postmenopausal bleeding. Patient had continued bleeding for a few weeks after EMBx. Patient reports bleeding has now stopped. EMBx benign.     TVUS:  Transabdominal and transvaginal images were obtained.  The uterus measures 8.5 x 4.2 x 5.8 cm, within the normal range.  The endometrial stripe is 8 mm, abnormal.  The uterus demonstrates 3 fibroids.  Along the posterior left body of the uterus is a 1.4 x 1.1 x 1.5 cm subserosal fibroid.  There is a 2.6 x 2.4 x 2.6 cm subserosal fibroid along the left body of the uterus.  Finally, a subcentimeter intramural fibroid is present also along the posterior uterus.  There is some minimal fluid within the cervical canal.  There is a lobulated region of tissue projecting into the fluid .  This contains some internal flow.    Ovaries: The right ovary has a normal size and appearance and measures 2.3 x 2.3 x 1.2 cm.  The left ovary is not visualized.    Impression:  In this postmenopausal woman with bleeding the endometrial stripe is thickened measuring 8 mm.    There is some lobulated tissue within the cervical canal projecting into a small amount of fluid.  It contains some internal vascularity, raising possibility of a polyp.    FINAL PATHOLOGIC DIAGNOSIS  ENDOMETRIAL TISSUE WITH FOCAL GLANDULAR AND STROMAL BREAKDOWN.  NO MALIGNANCY IDENTIFIED.    Review of patient's allergies indicates:   Allergen Reactions    Iodinated contrast- oral and iv dye        Past Medical History:   Diagnosis Date    Endometriosis     Miscarriage     Thyroid disease      Past Surgical History:   Procedure Laterality Date    BACK SURGERY      DILATION AND CURETTAGE OF UTERUS      History of polyps    SALPINGOOPHORECTOMY      WISDOM TOOTH EXTRACTION       OB History       Para Term  AB Living    2       2      SAB TAB Ectopic Multiple Live Births                     Family History   Problem Relation Age of Onset    Hypertension Father     Breast cancer Sister     Bone cancer Sister     Lymphoma Maternal Aunt     Colon cancer Neg Hx     Diabetes Neg Hx     Miscarriages / Stillbirths Neg Hx     Stroke Neg Hx      Social History   Substance Use Topics    Smoking status: Never Smoker    Smokeless tobacco: Never Used    Alcohol use Yes      Comment: 2-3 times per week       Current Outpatient Prescriptions   Medication Sig    CLIMARA PRO 0.045-0.015 mg/24 hr Place onto the skin once a week.     levothyroxine (SYNTHROID) 88 MCG tablet before breakfast.     traZODone (DESYREL) 50 MG tablet nightly as needed.     cetirizine (ZYRTEC) 10 MG tablet Take 10 mg by mouth once daily.     No current facility-administered medications for this visit.          Review of Systems:  Review of Systems   Constitutional: Negative for chills, fatigue, fever and unexpected weight change.   Respiratory: Negative for cough, shortness of breath and wheezing.    Cardiovascular: Negative for chest pain and palpitations.   Gastrointestinal: Negative for abdominal pain, constipation, diarrhea, nausea and vomiting.   Genitourinary: Positive for postmenopausal bleeding. Negative for dyspareunia, hematuria, menorrhagia, pelvic pain, vaginal bleeding, vaginal discharge and vaginal pain.   Musculoskeletal: Negative for myalgias.   Neurological: Negative for syncope and headaches.   Hematological: Negative for adenopathy.   Psychiatric/Behavioral: The patient is not nervous/anxious.         OBJECTIVE:     Physical Exam:  Physical Exam   Constitutional: She is oriented to person, place, and time. She appears well-developed and well-nourished. No distress.   HENT:   Head: Normocephalic and atraumatic.   Neck: Normal range of motion.   Cardiovascular: Normal rate, regular rhythm and normal heart sounds.   Exam reveals no gallop and no friction rub.    No murmur heard.  Pulmonary/Chest: Effort normal and breath sounds normal. No respiratory distress. She has no wheezes. She has no rales.   Abdominal: Soft. She exhibits no distension and no mass. There is no tenderness. There is no rebound and no guarding. No hernia.   Genitourinary:   Genitourinary Comments: See prior notes     Musculoskeletal: Normal range of motion. She exhibits no edema.   Neurological: She is alert and oriented to person, place, and time.   Psychiatric: She has a normal mood and affect. Her behavior is normal. Judgment and thought content normal.         ASSESSMENT:       ICD-10-CM ICD-9-CM    1. Postmenopausal bleeding N95.0 627.1 Notify Physician/Vital Signs Parameters Urine output less than 0.5mL/kg/hr (with indwelling catheter) or 30 mL/hr (without indwelling catheter) or blood glucose greater than 200 mg/dL      Notify physician      Notify Physician - Potential Need of Opioid Reversal      Place in Outpatient      Vital signs      Pulse Oximetry Q4H      Up ad nicholas      Place JASON hose      Place sequential compression device      Diet NPO          Plan:      Sara was seen today for pre-op exam.    Diagnoses and all orders for this visit:    Postmenopausal bleeding  - To OR for Hscope/D&C on 4/13 - suspect polyp.   - Consents signed. Risks, benefits, alternatives discussed.  - Orders placed, to preop today.   -     Notify Physician/Vital Signs Parameters Urine output less than 0.5mL/kg/hr (with indwelling catheter) or 30 mL/hr (without indwelling catheter) or blood glucose greater than 200 mg/dL; Standing  -     Notify physician; Standing  -     Notify Physician - Potential Need of Opioid Reversal; Standing  -     Place in Outpatient; Standing  -     Vital signs; Standing  -     Pulse Oximetry Q4H; Standing  -     Up ad nicholas; Standing  -     Place JASON hose; Standing  -     Place sequential compression device; Standing  -     Diet NPO;  Standing    Other orders  -     IP VTE LOW RISK PATIENT; Standing    No orders of the defined types were placed in this encounter.      Follow-up in about 6 weeks (around 5/23/2018) for post op.    Counseling time: 15 minutes    Meche Wynn

## 2018-04-13 NOTE — DISCHARGE INSTRUCTIONS
Anesthesia: After Your Surgery  Youve just had surgery. During surgery, you received medication called anesthesia to keep you comfortable and pain-free. After surgery, you may experience some pain or nausea. This is common. Here are some tips for feeling better and recovering after surgery.    Going home  Your doctor or nurse will show you how to take care of yourself when you go home. He or she will also answer your questions. Have an adult family member or friend drive you home. For the first 24 hours after your surgery:  · Do not drive or use heavy equipment.  · Do not make important decisions or sign legal documents.  · Avoid alcohol.  · Have someone stay with you, if needed. He or she can watch for problems and help keep you safe.  Be sure to keep all follow-up appointments with your doctor. And rest after your procedure for as long as your doctor tells you to.    Coping with pain  If you have pain after surgery, pain medication will help you feel better. Take it as directed, before pain becomes severe. Also, ask your doctor or pharmacist about other ways to control pain, such as with heat, ice, and relaxation. And follow any other instructions your surgeon or nurse gives you.    Tips for taking pain medication  To get the best relief possible, remember these points:  · Pain medications can upset your stomach. Taking them with a little food may help.  · Most pain relievers taken by mouth need at least 20 to 30 minutes to take effect.  · Taking medication on a schedule can help you remember to take it. Try to time your medication so that you can take it before beginning an activity, such as dressing, walking, or sitting down for dinner.  · Constipation is a common side effect of pain medications. Contact your doctor before taking any medications like laxatives or stool softeners to help relieve constipation. Also ask about any dietary restrictions, because drinking lots of fluids and eating foods like fruits  and vegetables that are high in fiber can also help. Remember, dont take laxatives unless your surgeon has prescribed them.  · Mixing alcohol and pain medication can cause dizziness and slow your breathing. It can even be fatal. Dont drink alcohol while taking pain medication.  · Pain medication can slow your reflexes. Dont drive or operate machinery while taking pain medication.  If your health care provider tells you to take acetaminophen to help relieve your pain, ask him or her how much you are supposed to take each day. (Acetaminophen is the generic name for Tylenol and other brand-name pain relievers.) Acetaminophen or other pain relievers may interact with your prescription medicines or other over-the-counter (OTC) drugs. Some prescription medications contain acetaminophen along with other active ingredients. Using both prescription and OTC acetaminophen for pain can cause you to overdose. The FDA recommends that you read the labels on your OTC medications carefully. This will help you to clearly understand the list of active ingredients, dosing instructions, and any warnings. It may also help you avoid taking too much acetaminophen. If you have questions or don't understand the information, ask your pharmacist or health care provider to explain it to you before you take the OTC medication.    Managing nausea  Some people have an upset stomach after surgery. This is often due to anesthesia, pain, pain medications, or the stress of surgery. The following tips will help you manage nausea and get good nutrition as you recover. If you were on a special diet before surgery, ask your doctor if you should follow it during recovery. These tips may help:  · Dont push yourself to eat. Your body will tell you when to eat and how much.  · Start off with clear liquids and soup. They are easier to digest.  · Progress to semi-solid foods (mashed potatoes, applesauce, and gelatin) as you feel ready.  · Slowly move to  solid foods. Dont eat fatty, rich, or spicy foods at first.  · Dont force yourself to have three large meals a day. Instead, eat smaller amounts more often.  · Take pain medications with a small amount of solid food, such as crackers or toast to avoid nausea.      Call your surgeon if  · You still have pain an hour after taking medication (it may not be strong enough).  · You feel too sleepy, dizzy, or groggy (medication may be too strong).  · You have side effects like nausea, vomiting, or skin changes (rash, itching, or hives).   © 7175-5394 [employer]. [address] [place] [address], [place] [address]. All rights reserved. This information is not intended as a substitute for professional medical care. Always follow your healthcare professional's instructions.                Home Care Instruction D&C Hysteroscopy             ACTIVITY LEVEL:  If you received sedation and/or an anesthetic, you may feel sleepy for several hours. Rest until you feel more  awake. Gradually resume your normal activities.    DIET:  At home, continue with liquids. If there is no nausea, you may eat a soft diet and gradually resume a regular diet.    BATHING:  You may shower  as desired in one day.  You should avoid tub baths, hot tubs and swimming pools until seen by your physician for a post-op follow up.    CARE:  You can expect watery or bloody vaginal discharge for several days. Do not use tampons, please only use pads. Sexual activity is restricted as advised by your doctor.    MEDICATIONS:  You will receive instructions for any pain and/or antibiotic prescriptions. Pain medication should be taken only if needed and as directed. Antibiotics, if ordered, should be taken as directed until the entire prescription is completed.    ADDITIONAL INFORMATION:  __________________________________________________________________________________________  WHEN TO CALL THE DOCTOR:   For any heavy vaginal bleeding   Fever over 101°F  (38.4°C)   Any lower abdominal pain not relieved by the pain mediation    ____________________________________________________________________________________    .

## 2018-04-13 NOTE — INTERVAL H&P NOTE
The patient has been examined and the H&P has been reviewed:    I concur with the findings and no changes have occurred since H&P was written.    Anesthesia/Surgery risks, benefits and alternative options discussed and understood by patient/family.          Active Hospital Problems    Diagnosis  POA    Postmenopausal bleeding [N95.0]  Yes      Resolved Hospital Problems    Diagnosis Date Resolved POA   No resolved problems to display.

## 2018-04-13 NOTE — OP NOTE
Operative Report    Procedure: Dilation and Curettage, Hysteroscopy    Date of Surgery 04/13/2018    Surgeon: Meche Wynn MD - Primary    Assistant: Dr. Damion Gill, PGY-1    Pre-Op Diagnosis: Postmenopausal bleeding [N95.0]    Post-op Diagnosis:  Same    EBL: Minimal     IVF: 500mL    UOP: 100mL     Specimens:          Specimen (12h ago through future)     Start     Ordered     04/13/18 1402   Specimen to Pathology - Surgery  Once     Comments:  Endometrial curettage       04/13/18 1402     Findings:   1. Uterus sounded to 8 cm  2. Cervix did NOT descend well with gentle traction on the single-tooth tenaculum   3. Cervical os dilated to 5 [place] by the Lee dilators  4. Hysteroscopic findings include: Uterine cavity with large fibroid-appearing mass projecting into the cavity from the anterior wall. Redundant endometrial tissue at bilateral cornua obstructing the view of the ostia bilaterally   5. Sharp Curettage performed and all specimens sent to pathology   6. Hemostasis obtained at the end of the procedure  7. Total Fluid: 860mL. Fluid deficit 50mL.    Procedure in Detail:  The patient was taken to the Operating Room where general anesthesia was obtained. She was then was placed in the dorsal lithotomy position with her legs in the yellowfin stirrups. The patient was then prepped and draped in the normal sterile fashion. The weighted speculum was placed in the posterior aspect of the vagina and the right angle retractor was placed in the anterior aspect of the vagina. The cervix was visualized and a single-tooth tenaculum was used to grasp the posterior aspect of the cervix. The cervix did not descend well with gentle traction on the single-tooth tenaculum. The uterus was sounded to 8 cm with the uterine sound, and Lee dilators were used to accommodate the 5-[place]an hysteroscope. The hysteroscope was then inserted into the cervical os and and avanced through the cervical canal until the uterine cavity  was directly visualized. Hysteroscopic findings include: Uterine cavity with large fibroid-appearing mass projecting into the cavity from the anterior wall. Redundant endometrial tissue at bilateral cornua obstructing the view of the ostia bilaterally. The TruClear device was inserted into the scope and used to shave down the anterior mass, as well as to sample the endometrium from the posterior wall. The hysteroscope was then removed and sharp currettage of the uterus was performed. All specimens were sent to pathology for evaluation. The single tooth tenaculum was then removed and the cervix was made hemostatic with pressure.  Sponge and lap counts were correct x 2.  The patient tolerated the procedure well and was taken to recovery in stable condition.    Damion Gill M.D.  Obstetrics & Gynecology  PGY-1

## 2018-04-13 NOTE — PLAN OF CARE
Sara David has met all discharge criteria from Phase II. Vital Signs are stable, ambulating  without difficulty.Pain is now under control and tolerable for the pt. Pain score 3 at this time.  Discharge instructions given, patient verbalized understanding. Discharged from facility via wheelchair in stable condition.

## 2018-04-16 PROCEDURE — 88305 TISSUE EXAM BY PATHOLOGIST: CPT | Mod: 26,,, | Performed by: PATHOLOGY

## 2018-04-16 PROCEDURE — 88305 TISSUE EXAM BY PATHOLOGIST: CPT | Performed by: PATHOLOGY

## 2018-04-16 NOTE — PROCEDURES
Endometrial biopsy  Date/Time: 3/19/2018 7:01 PM  Performed by: PRITI WYNN.  Authorized by: PRITI WYNN.   Preparation: Patient was prepped and draped in the usual sterile fashion.  Local anesthesia used: no    Anesthesia:  Local anesthesia used: no    Sedation:  Patient sedated: no  Patient tolerance: Patient tolerated the procedure well with no immediate complications        PROCEDURE: Endometrial biopsy    INDICATION: postmenopausal bleeding    PATIENT CONSENT:      PRE ENDOMETRIAL BIOPSY COUNSELING:   The patient was informed of the risk of bleeding, infection, uterine perforation and  pain and that the test will rule-out endometrial cancer with accuracy greater than   95%. She was counseled on the alternatives to endometrial biopsy.  All of her   questions were answered.  Patient gives written consent    ANESTHESIA: None    Labs: UPT performed prior to the procedure was negative.    PROCEDURE NOTE:  The cervix was visualized with a speculum and swabbed with Betadinex3.  The anterior lip of the cervix was grasped with a single toothed tenaculum, and a sterile endometrial pipelle was inserted into the uterus to a sound length of 7 cm. 3 passes were made with the pipelle and slight amount of tissue was obtained. The specimen was placed in formalin and sent to Pathology for evaluation.     COMPLICATIONS: None    DISPOSITION: The patient tolerated the above procedure well.      POST ENDOMETRIAL BIOPSY COUNSELING:  - Manage post biopsy cramping with NSAIDs or Tylenol.  - Expect spotting or light bleeding for a few days.  - Report bleeding heavier than a period, fever > 101.0 F, worsening pain or a foul  smelling vaginal discharge.      Priti Wynn MD

## 2018-04-18 ENCOUNTER — TELEPHONE (OUTPATIENT)
Dept: OBSTETRICS AND GYNECOLOGY | Facility: CLINIC | Age: 63
End: 2018-04-18

## 2018-04-18 NOTE — TELEPHONE ENCOUNTER
Called patient with pathology report - benign. Patient states that she is doing well. Having some light discharge, no bleeding. Patient expressed understanding. Patient to schedule 4-6 week postop visit.

## 2018-08-23 ENCOUNTER — DOCUMENTATION ONLY (OUTPATIENT)
Dept: INTERNAL MEDICINE | Facility: CLINIC | Age: 63
End: 2018-08-23

## 2018-08-23 ENCOUNTER — TELEPHONE (OUTPATIENT)
Dept: INTERNAL MEDICINE | Facility: CLINIC | Age: 63
End: 2018-08-23

## 2018-08-23 NOTE — TELEPHONE ENCOUNTER
----- Message from Nena Leon sent at 8/23/2018  2:20 PM CDT -----  Contact: Self  Pt is a NP looking to establish care.   did not see Dr. Coelho's name on the list as accepting new pts, but the pt is requesting Staff contact her for discussion and possible scheduling of an appt.    She can be reached at [phone].    Thank you.

## 2018-11-03 ENCOUNTER — IMMUNIZATION (OUTPATIENT)
Dept: INTERNAL MEDICINE | Facility: CLINIC | Age: 63
End: 2018-11-03
Payer: MEDICARE

## 2018-11-03 ENCOUNTER — OFFICE VISIT (OUTPATIENT)
Dept: INTERNAL MEDICINE | Facility: CLINIC | Age: 63
End: 2018-11-03
Payer: MEDICARE

## 2018-11-03 VITALS
BODY MASS INDEX: 29.42 KG/M2 | DIASTOLIC BLOOD PRESSURE: 68 MMHG | WEIGHT: 210.13 LBS | SYSTOLIC BLOOD PRESSURE: 102 MMHG | TEMPERATURE: 98 F | OXYGEN SATURATION: 97 % | HEART RATE: 77 BPM | HEIGHT: 71 IN

## 2018-11-03 DIAGNOSIS — Z00.00 ENCOUNTER FOR HEALTH MAINTENANCE EXAMINATION: ICD-10-CM

## 2018-11-03 DIAGNOSIS — E03.9 HYPOTHYROIDISM, UNSPECIFIED TYPE: ICD-10-CM

## 2018-11-03 DIAGNOSIS — G47.9 SLEEP DIFFICULTIES: ICD-10-CM

## 2018-11-03 DIAGNOSIS — R21 RASH: ICD-10-CM

## 2018-11-03 DIAGNOSIS — Z85.820 HISTORY OF MELANOMA: ICD-10-CM

## 2018-11-03 DIAGNOSIS — Z82.62 FAMILY HISTORY OF OSTEOPOROSIS: ICD-10-CM

## 2018-11-03 DIAGNOSIS — N95.1 HOT FLASHES DUE TO MENOPAUSE: Primary | ICD-10-CM

## 2018-11-03 DIAGNOSIS — Z13.6 ENCOUNTER FOR SCREENING FOR CARDIOVASCULAR DISORDERS: ICD-10-CM

## 2018-11-03 DIAGNOSIS — Z91.09 ENVIRONMENTAL ALLERGIES: ICD-10-CM

## 2018-11-03 PROCEDURE — 99999 PR PBB SHADOW E&M-EST. PATIENT-LVL III: CPT | Mod: PBBFAC,,, | Performed by: INTERNAL MEDICINE

## 2018-11-03 PROCEDURE — 99203 OFFICE O/P NEW LOW 30 MIN: CPT | Mod: 25,S$GLB,, | Performed by: INTERNAL MEDICINE

## 2018-11-03 PROCEDURE — 3008F BODY MASS INDEX DOCD: CPT | Mod: CPTII,S$GLB,, | Performed by: INTERNAL MEDICINE

## 2018-11-03 PROCEDURE — 1101F PT FALLS ASSESS-DOCD LE1/YR: CPT | Mod: CPTII,S$GLB,, | Performed by: INTERNAL MEDICINE

## 2018-11-03 PROCEDURE — G0008 ADMIN INFLUENZA VIRUS VAC: HCPCS | Mod: S$GLB,,, | Performed by: INTERNAL MEDICINE

## 2018-11-03 PROCEDURE — 90662 IIV NO PRSV INCREASED AG IM: CPT | Mod: S$GLB,,, | Performed by: INTERNAL MEDICINE

## 2018-11-03 NOTE — PROGRESS NOTES
Subjective:       Patient ID: Sara David is a 63 y.o. female.    Chief Complaint: Establish Care    HPI  64 y/o woman with hypothyroidism, hot flashes, h/o melanoma, allergies here to establish care. Has previously followed with Dr Morelos at [facility]; transferring due to insurance change.    Menopausal symptoms, hot flashes - Previously on climara patch, this stopped being covered when she switched to Medicare so she stopped this around 9/2018.  Started on HRT around 2004-05 for severe frequent hot flashes that interfered with sleep, felt this was very effective, doesn't especially want to stop using this.  Waking up at least every hour with hot flashes - doesn't feel that current level of symptoms is tolerable. Hasn't yet checked in with Gyn about this.  Mentions that her mother remained on HRT into her late 70s.    Hypothyroidism - diagnosed in her 40s, noted to have enlarged thyroid, doesn't think Hashimoto's, reports having had FNA biopsy which was benign. Uses generic levothyroxine, stable on 88mcg, hasn't had labs in >1 year.  Gradual weight gain, no temperature intolerance other than the hot flashes, no GI issues.    Sleep problems - has taken trazodone nightly as needed for years, +sometimes also advil PM    Taking cetirizine for sinuses / allergies, notes that if she doesn't take this daily she breaks out in a rash, has had this for years.   Does tend to also get a rash in the winter when weather is more dry - generally sensitive skin.    H/o melanoma in 1990s on back, then on upper L arm 5/2018, follows with Dr Parker for dermatology.     Reports bone scan done recently for screening for osteoporosis.    Review of Systems   Constitutional: Negative for activity change and fever.   HENT:        No acute issues   Eyes: Negative for visual disturbance.   Respiratory: Negative for cough and shortness of breath.    Cardiovascular: Negative.  Negative for chest pain and leg swelling.   Gastrointestinal:  "Negative.  Negative for abdominal pain.   Endocrine:        Hot flashes as noted   Genitourinary: Negative.    Musculoskeletal: Negative for gait problem and joint swelling.   Skin: Positive for rash (sometimes).   Neurological: Negative for weakness and numbness.   Psychiatric/Behavioral: Negative.          Past Medical History:   Diagnosis Date    Endometriosis     Hypothyroidism     dx in her 40s, enlarged thyroid, did have FNA of thyroid nodule which was benign    Melanoma 1990, 2018 1990s - on back; 2018 - L upper arm    Miscarriage      Past Surgical History:   Procedure Laterality Date    BACK SURGERY      DILATION AND CURETTAGE OF UTERUS      History of polyps    FNA thyroid      in her 40s    [id number] N/A 4/13/2018    [facility]    MALIGNANT SKIN LESION EXCISION      1990s, 2018    SALPINGOOPHORECTOMY      WISDOM TOOTH EXTRACTION       Family History   Problem Relation Age of Onset    Hypertension Father     Breast cancer Sister     Bone cancer Sister     Lymphoma Maternal Aunt     Colon cancer Neg Hx     Diabetes Neg Hx     Miscarriages / Stillbirths Neg Hx     Stroke Neg Hx        Social History     Tobacco Use    Smoking status: Never Smoker    Smokeless tobacco: Never Used   Substance Use Topics    Alcohol use: Yes     Comment: 2-3 times per week    Drug use: Yes     Types: Marijuana       Medications and allergies reviewed.     Objective:          Vitals:    11/03/18 1059   BP: 102/68   BP Location: Left arm   Patient Position: Sitting   Pulse: 77   Temp: 98.1 °F (36.7 °C)   TempSrc: Oral   SpO2: 97%   Weight: 95.3 kg (210 lb 1.6 oz)   Height: 5' 11" (1.803 m)     Body mass index is 29.3 kg/m².  Physical Exam   Constitutional: She is oriented to person, place, and time. She appears well-developed and well-nourished. No distress.   HENT:   Head: Normocephalic and atraumatic.   Mouth/Throat: Oropharynx is clear and moist. "   Eyes: Conjunctivae and EOM are normal. Pupils are equal, round, and reactive to light. No scleral icterus.   Neck: Neck supple. Thyromegaly: possible mild thyromegaly, no firm nodules.   Cardiovascular: Normal rate, regular rhythm and normal heart sounds.   No murmur heard.  Pulmonary/Chest: Effort normal and breath sounds normal. No respiratory distress.   Abdominal: Soft. Bowel sounds are normal. She exhibits no distension. There is no tenderness.   Musculoskeletal: She exhibits no edema or tenderness.   Lymphadenopathy:     She has no cervical adenopathy.   Neurological: She is alert and oriented to person, place, and time. No cranial nerve deficit. Gait normal.   Skin: Skin is warm and dry. No rash noted.   Well-healed surgical scar L upper arm   Psychiatric: She has a normal mood and affect.   Vitals reviewed.      Lab Results   Component Value Date    WBC 7.93 04/11/2018    HGB 14.6 04/11/2018    HCT 43.7 04/11/2018     04/11/2018       Assessment:       1. Hot flashes due to menopause    2. Hypothyroidism, unspecified type    3. History of melanoma    4. Family history of osteoporosis    5. Environmental allergies    6. Rash    7. Sleep difficulties    8. Encounter for health maintenance examination    9. Encounter for screening for cardiovascular disorders         Plan:   Sara was seen today for establish care.    Diagnoses and all orders for this visit:    Hot flashes due to menopause - discussed that severity of current hot flashes may be due to sudden discontinuation of longterm HRT rather than an indicator that she definitely needs ongoing longterm HRT. For now, will try to find combination HRT patch that is affordable under her insurance; also recommended she check in with Gyn for more detailed discussion of risks/benefits of ongoing HRT >66 y/o    Hypothyroidism, unspecified type clinically euthyroid today  Comments:  due for repeat labs  Orders:  -     TSH; Future    History of  melanoma  Comments:  continue to follow regularly with dermatologist    Family history of osteoporosis  Comments:  DEXA record requested    Environmental allergies  Comments:  continue cetirizine  Rash  Comments:  none present currently - given that this recurs when off antihistamine, likely histamine-mediated allergy; recommended return for evaluation if this recurs  Will consider seeing Allergy in the future    Sleep difficulties  Comments:  ok to continue trazodone    Encounter for health maintenance examination  -     CBC Without Differential; Future  -     Comprehensive metabolic panel; Future  -     Lipid panel; Future  -     TSH; Future  -     Hepatitis C antibody; Future    Encounter for screening for cardiovascular disorders   -     Lipid panel; Future    Health maintenance reviewed with patient.   Fasting labs in the next 1-2 weeks  Records requested from Dr Morelos - will update HCM once these are received  Message sent to pharmacy staff to try to clarify medication issue    Follow-up in about 3 months (around 2/3/2019) for annual physical.    Dane Finley MD  Internal Medicine  [facility]  11/3/2018

## 2018-11-05 PROBLEM — N95.0 POSTMENOPAUSAL BLEEDING: Status: RESOLVED | Noted: 2018-04-13 | Resolved: 2018-11-05

## 2018-11-05 PROBLEM — G47.9 SLEEP DIFFICULTIES: Status: ACTIVE | Noted: 2018-11-05

## 2018-11-05 PROBLEM — N95.1 HOT FLASHES DUE TO MENOPAUSE: Status: ACTIVE | Noted: 2018-11-05

## 2018-11-05 PROBLEM — Z91.09 ENVIRONMENTAL ALLERGIES: Status: ACTIVE | Noted: 2018-11-05

## 2018-11-05 PROBLEM — Z82.62 FAMILY HISTORY OF OSTEOPOROSIS: Status: ACTIVE | Noted: 2018-11-05

## 2018-11-05 PROBLEM — Z85.820 HISTORY OF MELANOMA: Status: ACTIVE | Noted: 2018-11-05

## 2018-11-05 PROBLEM — E03.9 HYPOTHYROIDISM: Status: ACTIVE | Noted: 2018-11-05

## 2018-11-05 PROBLEM — R21 RASH: Status: ACTIVE | Noted: 2018-11-05

## 2018-11-08 ENCOUNTER — TELEPHONE (OUTPATIENT)
Dept: INTERNAL MEDICINE | Facility: CLINIC | Age: 63
End: 2018-11-08

## 2018-11-08 DIAGNOSIS — N95.1 VASOMOTOR SYMPTOMS DUE TO MENOPAUSE: Primary | ICD-10-CM

## 2018-11-08 NOTE — TELEPHONE ENCOUNTER
"After patient's visit, checked in with pharmacy here re: insurance coverage for HRT for this patient. Per Ion Barlow at [facility] - "The insurance does not cover any HRT without a prior authorization." Not sure what the co-pay would be after an approval.    Please call patient to let her know, and ask whether she would like me to try sending the climara patch rx to the pharmacy here so that it can go through the prior auth process, or if she would like to check in with her Gyn about this first.      "

## 2018-11-09 DIAGNOSIS — Z12.39 BREAST CANCER SCREENING: ICD-10-CM

## 2018-11-09 NOTE — TELEPHONE ENCOUNTER
She states that she would like you to write a prescription for Climara Pro. She wants it mailed to her. She found a pharmacy that the cost is cheaper and she will pay out of pocket. Her Gyn doctor is presently out on maternity leave until January. She is going to join [employer] next year and perhaps they will pay for this medication.

## 2018-11-10 NOTE — TELEPHONE ENCOUNTER
Rx printed, please mail to patient and let her know when this has been sent  Have given sufficient refills for 3 months, would recommend that she check in with her Gyn within that time -- she could contact her Gyn to see if there's another provider in that department that she could discuss post-menopausal HRT with if needed.

## 2018-12-04 ENCOUNTER — LAB VISIT (OUTPATIENT)
Dept: LAB | Facility: HOSPITAL | Age: 63
End: 2018-12-04
Attending: INTERNAL MEDICINE
Payer: MEDICARE

## 2018-12-04 DIAGNOSIS — E03.9 HYPOTHYROIDISM, UNSPECIFIED TYPE: ICD-10-CM

## 2018-12-04 DIAGNOSIS — Z00.00 ENCOUNTER FOR HEALTH MAINTENANCE EXAMINATION: ICD-10-CM

## 2018-12-04 DIAGNOSIS — Z13.6 ENCOUNTER FOR SCREENING FOR CARDIOVASCULAR DISORDERS: ICD-10-CM

## 2018-12-04 LAB
ALBUMIN SERPL BCP-MCNC: 3.7 G/DL
ALP SERPL-CCNC: 64 U/L
ALT SERPL W/O P-5'-P-CCNC: 22 U/L
ANION GAP SERPL CALC-SCNC: 8 MMOL/L
AST SERPL-CCNC: 18 U/L
BILIRUB SERPL-MCNC: 0.6 MG/DL
BUN SERPL-MCNC: 17 MG/DL
CALCIUM SERPL-MCNC: 9.3 MG/DL
CHLORIDE SERPL-SCNC: 107 MMOL/L
CHOLEST SERPL-MCNC: 213 MG/DL
CHOLEST/HDLC SERPL: 3.6 {RATIO}
CO2 SERPL-SCNC: 27 MMOL/L
CREAT SERPL-MCNC: 1.1 MG/DL
ERYTHROCYTE [DISTWIDTH] IN BLOOD BY AUTOMATED COUNT: 13.5 %
EST. GFR  (AFRICAN AMERICAN): >60 ML/MIN/1.73 M^2
EST. GFR  (NON AFRICAN AMERICAN): 53 ML/MIN/1.73 M^2
GLUCOSE SERPL-MCNC: 93 MG/DL
HCT VFR BLD AUTO: 43.2 %
HCV AB SERPL QL IA: NEGATIVE
HDLC SERPL-MCNC: 59 MG/DL
HDLC SERPL: 27.7 %
HGB BLD-MCNC: 13.9 G/DL
LDLC SERPL CALC-MCNC: 124 MG/DL
MCH RBC QN AUTO: 31.2 PG
MCHC RBC AUTO-ENTMCNC: 32.2 G/DL
MCV RBC AUTO: 97 FL
NONHDLC SERPL-MCNC: 154 MG/DL
PLATELET # BLD AUTO: 295 K/UL
PMV BLD AUTO: 9.2 FL
POTASSIUM SERPL-SCNC: 4.2 MMOL/L
PROT SERPL-MCNC: 6.5 G/DL
RBC # BLD AUTO: 4.46 M/UL
SODIUM SERPL-SCNC: 142 MMOL/L
TRIGL SERPL-MCNC: 150 MG/DL
TSH SERPL DL<=0.005 MIU/L-ACNC: 0.97 UIU/ML
WBC # BLD AUTO: 6.68 K/UL

## 2018-12-04 PROCEDURE — 36415 COLL VENOUS BLD VENIPUNCTURE: CPT

## 2018-12-04 PROCEDURE — 80053 COMPREHEN METABOLIC PANEL: CPT

## 2018-12-04 PROCEDURE — 80061 LIPID PANEL: CPT

## 2018-12-04 PROCEDURE — 84443 ASSAY THYROID STIM HORMONE: CPT

## 2018-12-04 PROCEDURE — 86803 HEPATITIS C AB TEST: CPT

## 2018-12-04 PROCEDURE — 85027 COMPLETE CBC AUTOMATED: CPT

## 2019-01-11 ENCOUNTER — TELEPHONE (OUTPATIENT)
Dept: OBSTETRICS AND GYNECOLOGY | Facility: CLINIC | Age: 64
End: 2019-01-11

## 2019-01-11 ENCOUNTER — OFFICE VISIT (OUTPATIENT)
Dept: OBSTETRICS AND GYNECOLOGY | Facility: CLINIC | Age: 64
End: 2019-01-11
Payer: MEDICARE

## 2019-01-11 VITALS — WEIGHT: 207.25 LBS | BODY MASS INDEX: 29.02 KG/M2 | HEIGHT: 71 IN

## 2019-01-11 DIAGNOSIS — N95.1 HOT FLUSHES, PERIMENOPAUSAL: Primary | ICD-10-CM

## 2019-01-11 PROCEDURE — 1101F PT FALLS ASSESS-DOCD LE1/YR: CPT | Mod: CPTII,S$GLB,, | Performed by: OBSTETRICS & GYNECOLOGY

## 2019-01-11 PROCEDURE — 99999 PR PBB SHADOW E&M-EST. PATIENT-LVL II: ICD-10-PCS | Mod: PBBFAC,,, | Performed by: OBSTETRICS & GYNECOLOGY

## 2019-01-11 PROCEDURE — 99999 PR PBB SHADOW E&M-EST. PATIENT-LVL II: CPT | Mod: PBBFAC,,, | Performed by: OBSTETRICS & GYNECOLOGY

## 2019-01-11 PROCEDURE — 99213 PR OFFICE/OUTPT VISIT, EST, LEVL III, 20-29 MIN: ICD-10-PCS | Mod: S$GLB,,, | Performed by: OBSTETRICS & GYNECOLOGY

## 2019-01-11 PROCEDURE — 3008F PR BODY MASS INDEX (BMI) DOCUMENTED: ICD-10-PCS | Mod: CPTII,S$GLB,, | Performed by: OBSTETRICS & GYNECOLOGY

## 2019-01-11 PROCEDURE — 99213 OFFICE O/P EST LOW 20 MIN: CPT | Mod: S$GLB,,, | Performed by: OBSTETRICS & GYNECOLOGY

## 2019-01-11 PROCEDURE — 3008F BODY MASS INDEX DOCD: CPT | Mod: CPTII,S$GLB,, | Performed by: OBSTETRICS & GYNECOLOGY

## 2019-01-11 PROCEDURE — 1101F PR PT FALLS ASSESS DOC 0-1 FALLS W/OUT INJ PAST YR: ICD-10-PCS | Mod: CPTII,S$GLB,, | Performed by: OBSTETRICS & GYNECOLOGY

## 2019-01-11 RX ORDER — PROGESTERONE 100 MG/1
100 CAPSULE ORAL NIGHTLY
Qty: 12 CAPSULE | Refills: 11 | Status: SHIPPED | OUTPATIENT
Start: 2019-01-11 | End: 2019-01-18 | Stop reason: SDUPTHER

## 2019-01-11 RX ORDER — ESTRADIOL 0.05 MG/D
1 FILM, EXTENDED RELEASE TRANSDERMAL WEEKLY
Qty: 8 PATCH | Refills: 11 | Status: SHIPPED | OUTPATIENT
Start: 2019-01-11 | End: 2019-02-25

## 2019-01-11 NOTE — PROGRESS NOTES
Subjective:       Patient ID: Sara David is a 63 y.o. female.    Chief Complaint:  Consult (hormones )    History of Present Illness:  HPI  64 y/o  presents for evaluation of menopausal symptoms. Patient was previously on Climara weekly patches, but this has increased in price after insurance change. She tried to wean but symptoms recurred (hot flushes and night sweats). She reports vaginal dryness as well. She has no contraindications to systemic estrogen therapy.    GYN & OB History  No LMP recorded (lmp unknown). Patient is postmenopausal.   Date of Last Pap: 3/20/2018    OB History    Para Term  AB Living   2       2     SAB TAB Ectopic Multiple Live Births                  # Outcome Date GA Lbr Brice/2nd Weight Sex Delivery Anes PTL Lv   2 AB            1 AB                   Review of Systems  Review of Systems   Constitutional: Negative for chills, diaphoresis, fatigue and fever.   Respiratory: Negative for cough and shortness of breath.    Cardiovascular: Negative for chest pain and palpitations.   Gastrointestinal: Negative for abdominal pain, constipation, diarrhea, nausea and vomiting.   Endocrine: Positive for hot flashes.   Genitourinary: Negative for dyspareunia, pelvic pain, vaginal bleeding, vaginal discharge and vaginal pain.   Neurological: Negative for headaches.   Psychiatric/Behavioral: Negative for depression. The patient is not nervous/anxious.             Objective:    Physical Exam:   Constitutional: She is oriented to person, place, and time. She appears well-developed and well-nourished. No distress.    HENT:   Head: Normocephalic and atraumatic.    Eyes: EOM are normal.    Neck: Normal range of motion.     Pulmonary/Chest: Effort normal.                  Musculoskeletal: Normal range of motion and moves all extremeties.       Neurological: She is alert and oriented to person, place, and time.    Skin: Skin is warm. No rash noted.    Psychiatric: She has a  normal mood and affect. Her behavior is normal. Judgment and thought content normal.          Assessment:        1. Hot flushes, perimenopausal             Plan:      Sara was seen today for consult.    Diagnoses and all orders for this visit:    Hot flushes, perimenopausal  -     estradiol 0.05 mg/24 hr td ptsw (VIVELLE-DOT) 0.05 mg/24 hr; Place 1 patch onto the skin once a week.  -     progesterone (PROMETRIUM) 100 MG capsule; Take 1 capsule (100 mg total) by mouth nightly.    A full discussion of the benefit-risk ratio of hormonal replacement therapy was carried out. Improvement in vasomotor and other climacteric symptoms was discussed, including possible improvements in sleep and mood. Reduction of risk for osteoporosis was explained. We discussed the study data showing increased risk of thrombo-embolic events such as myocardial infarction, stroke and also possibly breast cancer with estrogen replacement, and how this might affect her. The range of side effects such as breast tenderness, weight gain and including possible increases in lifetime risk of breast cancer and possible thrombotic complications was discussed. We also discussed ACOG's recommendation to use hormone replacement therapy for the relief of hot flashes alone and to be on the lowest dose possible for the shortest amount of time. Alternative such as herbal and soy-based products were reviewed. All of her questions about this therapy were answered.    No orders of the defined types were placed in this encounter.      Follow-up if symptoms worsen or fail to improve.

## 2019-01-11 NOTE — TELEPHONE ENCOUNTER
Spoke with Tennille at the pharmacy, directions for Vivelle patches changed to correspond with twice weekly dosage. Voiced understanding

## 2019-01-18 ENCOUNTER — TELEPHONE (OUTPATIENT)
Dept: OBSTETRICS AND GYNECOLOGY | Facility: CLINIC | Age: 64
End: 2019-01-18

## 2019-01-18 DIAGNOSIS — N95.1 HOT FLUSHES, PERIMENOPAUSAL: ICD-10-CM

## 2019-01-18 RX ORDER — PROGESTERONE 100 MG/1
100 CAPSULE ORAL NIGHTLY
Qty: 30 CAPSULE | Refills: 11 | Status: SHIPPED | OUTPATIENT
Start: 2019-01-18 | End: 2019-02-25

## 2019-01-18 NOTE — TELEPHONE ENCOUNTER
----- Message from Ivyalem Vega sent at 1/18/2019 10:24 AM CST -----  Contact: DANIELLE JACK [[id number]]  Name of Who is Calling:DANIELLE JACK [[id number]]        What is the request in detail: Pt requesting confirmation of directions regarding progesterone (PROMETRIUM) 100 MG capsule. Advise at your earliest convenience.  Thanks-          Can the clinic reply by [facility]: N    What Number to Call Back [facility] not [facility]: [phone]

## 2019-01-18 NOTE — TELEPHONE ENCOUNTER
Spoke with pt about directions about RX, pt stated aldridge only sent 12. Will notify MD of this error.

## 2019-01-18 NOTE — TELEPHONE ENCOUNTER
----- Message from Eli Sandoval LPN sent at 1/18/2019 10:52 AM CST -----  Contact: DANIELLE JACK [[id number]]      ----- Message -----  From: Bing Ferrari  Sent: 1/18/2019  10:24 AM  To: [facility] Staff    Name of Who is Calling:DANIELLE JACK [[id number]]        What is the request in detail: Pt requesting confirmation of directions regarding progesterone (PROMETRIUM) 100 MG capsule. Advise at your earliest convenience.  Thanks-          Can the clinic reply by [facility]: N    What Number to Call Back [facility] not in [facility]: [phone]

## 2019-02-04 ENCOUNTER — TELEPHONE (OUTPATIENT)
Dept: INTERNAL MEDICINE | Facility: CLINIC | Age: 64
End: 2019-02-04

## 2019-02-18 DIAGNOSIS — Z78.0 POSTMENOPAUSAL: Primary | ICD-10-CM

## 2019-02-22 ENCOUNTER — HOSPITAL ENCOUNTER (OUTPATIENT)
Dept: RADIOLOGY | Facility: HOSPITAL | Age: 64
Discharge: HOME | End: 2019-02-22
Attending: STUDENT IN AN ORGANIZED HEALTH CARE EDUCATION/TRAINING PROGRAM
Payer: MEDICARE

## 2019-02-22 ENCOUNTER — OFFICE VISIT (OUTPATIENT)
Dept: INTERNAL MEDICINE | Facility: CLINIC | Age: 64
End: 2019-02-22
Payer: MEDICARE

## 2019-02-22 VITALS
BODY MASS INDEX: 29.48 KG/M2 | TEMPERATURE: 97 F | WEIGHT: 210.56 LBS | HEART RATE: 88 BPM | HEIGHT: 71 IN | SYSTOLIC BLOOD PRESSURE: 102 MMHG | DIASTOLIC BLOOD PRESSURE: 69 MMHG

## 2019-02-22 DIAGNOSIS — S43.61XA SPRAIN OF RIGHT STERNOCLAVICULAR JOINT, INITIAL ENCOUNTER: ICD-10-CM

## 2019-02-22 DIAGNOSIS — M54.2 NECK PAIN ON RIGHT SIDE: ICD-10-CM

## 2019-02-22 DIAGNOSIS — M54.2 NECK PAIN ON RIGHT SIDE: Primary | ICD-10-CM

## 2019-02-22 PROCEDURE — 99213 OFFICE O/P EST LOW 20 MIN: CPT | Mod: GC,S$GLB,, | Performed by: STUDENT IN AN ORGANIZED HEALTH CARE EDUCATION/TRAINING PROGRAM

## 2019-02-22 PROCEDURE — 1101F PT FALLS ASSESS-DOCD LE1/YR: CPT | Mod: CPTII,GC,S$GLB, | Performed by: STUDENT IN AN ORGANIZED HEALTH CARE EDUCATION/TRAINING PROGRAM

## 2019-02-22 PROCEDURE — 1101F PR PT FALLS ASSESS DOC 0-1 FALLS W/OUT INJ PAST YR: ICD-10-PCS | Mod: CPTII,GC,S$GLB, | Performed by: STUDENT IN AN ORGANIZED HEALTH CARE EDUCATION/TRAINING PROGRAM

## 2019-02-22 PROCEDURE — 99213 PR OFFICE/OUTPT VISIT, EST, LEVL III, 20-29 MIN: ICD-10-PCS | Mod: GC,S$GLB,, | Performed by: STUDENT IN AN ORGANIZED HEALTH CARE EDUCATION/TRAINING PROGRAM

## 2019-02-22 PROCEDURE — 99999 PR PBB SHADOW E&M-EST. PATIENT-LVL III: CPT | Mod: PBBFAC,GC,, | Performed by: STUDENT IN AN ORGANIZED HEALTH CARE EDUCATION/TRAINING PROGRAM

## 2019-02-22 PROCEDURE — 3008F BODY MASS INDEX DOCD: CPT | Mod: CPTII,GC,S$GLB, | Performed by: STUDENT IN AN ORGANIZED HEALTH CARE EDUCATION/TRAINING PROGRAM

## 2019-02-22 PROCEDURE — 70360 X-RAY EXAM OF NECK: CPT | Mod: 26,,, | Performed by: RADIOLOGY

## 2019-02-22 PROCEDURE — 3008F PR BODY MASS INDEX (BMI) DOCUMENTED: ICD-10-PCS | Mod: CPTII,GC,S$GLB, | Performed by: STUDENT IN AN ORGANIZED HEALTH CARE EDUCATION/TRAINING PROGRAM

## 2019-02-22 PROCEDURE — 99999 PR PBB SHADOW E&M-EST. PATIENT-LVL III: ICD-10-PCS | Mod: PBBFAC,GC,, | Performed by: STUDENT IN AN ORGANIZED HEALTH CARE EDUCATION/TRAINING PROGRAM

## 2019-02-22 PROCEDURE — 70360 XR NECK SOFT TISSUE: ICD-10-PCS | Mod: 26,,, | Performed by: RADIOLOGY

## 2019-02-22 PROCEDURE — 70360 X-RAY EXAM OF NECK: CPT | Mod: TC

## 2019-02-22 NOTE — PROGRESS NOTES
Clinic Note  2/22/2019      Subjective:       Patient ID:  Rahel is a 63 y.o. female being seen for an established visit.    Chief Complaint: Neck Pain (collar bone )    Sara David is a 64 y/o woman with hypothyroidism, hot flashes, h/o melanoma, and insomnia who presents with a 1 month history of right sided neck swelling. She noticed this swelling while looking in the mirror. It has become painful in the past 3 days and is a 1-2/10. She presents today as she is concerned as her family has a history of lymphoma and bone cancer. Advil helps the pain. She is active and does aerobics. The pain is worse when she raises her right hand above her head. Denies falls and traumas. Patient denies chest pain, shortness of breath, weight loss, fevers, problems swallowing, and weakness.             Review of Systems   Constitutional: Negative for chills, fever, malaise/fatigue and weight loss.   HENT: Negative for congestion, ear pain and sore throat.    Eyes: Negative.    Respiratory: Negative for cough, hemoptysis, sputum production, shortness of breath and stridor.    Cardiovascular: Negative for chest pain.   Gastrointestinal: Negative for abdominal pain, diarrhea, nausea and vomiting.   Genitourinary: Negative.    Musculoskeletal: Positive for neck pain. Negative for back pain, falls, joint pain and myalgias.   Skin: Negative for rash.   Neurological: Negative for sensory change, focal weakness, weakness and headaches.   Psychiatric/Behavioral: Negative.        Past Medical History:   Diagnosis Date    Endometriosis     Hypothyroidism     dx in her 40s, enlarged thyroid, did have FNA of thyroid nodule which was benign    Melanoma 1990, 2018    1990s - on back; 2018 - L upper arm    Miscarriage        Family History   Problem Relation Age of Onset    Hypertension Father     Breast cancer Sister     Bone cancer Sister     Lymphoma Maternal Aunt     Colon cancer Neg Hx     Diabetes Neg Hx     Miscarriages /  "Stillbirths Neg Hx     Stroke Neg Hx         reports that  has never smoked. she has never used smokeless tobacco. She reports that she drinks alcohol. She reports that she uses drugs. Drug: Marijuana.    Medication List with Changes/Refills   Current Medications    CETIRIZINE (ZYRTEC) 10 MG TABLET    Take 10 mg by mouth once daily.    ESTRADIOL 0.05 MG/24 HR TD PTSW (VIVELLE-DOT) 0.05 MG/24 HR    Place 1 patch onto the skin once a week.    LEVOTHYROXINE (SYNTHROID) 88 MCG TABLET    before breakfast.     PROGESTERONE (PROMETRIUM) 100 MG CAPSULE    Take 1 capsule (100 mg total) by mouth nightly.    TRAZODONE (DESYREL) 50 MG TABLET    nightly as needed.      Review of patient's allergies indicates:   Allergen Reactions    Iodinated contrast- oral and iv dye        Patient Active Problem List   Diagnosis    S/P D&C (status post dilation and curettage)    Hot flashes due to menopause    Hypothyroidism    History of melanoma    Family history of osteoporosis    Environmental allergies    Rash    Sleep difficulties           Objective:      /69   Pulse 88   Temp 96.8 °F (36 °C)   Ht 5' 11" (1.803 m)   Wt 95.5 kg (210 lb 8.6 oz)   LMP  (LMP Unknown)   BMI 29.36 kg/m²   Estimated body mass index is 29.36 kg/m² as calculated from the following:    Height as of this encounter: 5' 11" (1.803 m).    Weight as of this encounter: 95.5 kg (210 lb 8.6 oz).      Physical Exam   Constitutional: She is oriented to person, place, and time and well-developed, well-nourished, and in no distress. No distress.   HENT:   Head: Normocephalic and atraumatic.   Eyes: EOM are normal.   Neck: Normal range of motion. No thyromegaly present.   Cardiovascular: Normal rate, regular rhythm and normal heart sounds.   Pulmonary/Chest: Effort normal and breath sounds normal. No respiratory distress.   Abdominal: Soft. She exhibits no distension.   Musculoskeletal: Normal range of motion. She exhibits tenderness. She exhibits no " deformity.        Right shoulder: Normal.        Left shoulder: Normal.        Cervical back: Normal.        Arms:  hebenden and walker nodes present    Neurological: She is alert and oriented to person, place, and time.   Skin: Skin is warm and dry. No rash noted. She is not diaphoretic. No erythema. No pallor.   Nursing note and vitals reviewed.        Assessment and Plan:         Sara was seen today for neck pain.    Diagnoses and all orders for this visit:    Neck pain on right side  -     Sedimentation rate; Future  -     C-reactive protein; Future  -     X-Ray Neck Soft Tissue; Future  -     CBC auto differential; Future  -     Neck pain most likely osteoarthritis. The thyroid does not appear to be enlarged and patient denies problems swallowing.   -     Obtain Alk. Phos. In future if swelling and pain persists.             Other Orders Placed This Visit:  Orders Placed This Encounter   Procedures    X-Ray Neck Soft Tissue    Sedimentation rate    C-reactive protein    CBC auto differential             Interview, Assessment, Findings, and Plan discussed with Dr. Muse.     Follow-up if symptoms worsen or fail to improve.      Taqueria Briones MD  Internal Medicine

## 2019-02-25 ENCOUNTER — PATIENT MESSAGE (OUTPATIENT)
Dept: OBSTETRICS AND GYNECOLOGY | Facility: CLINIC | Age: 64
End: 2019-02-25

## 2019-02-25 ENCOUNTER — TELEPHONE (OUTPATIENT)
Dept: INTERNAL MEDICINE | Facility: CLINIC | Age: 64
End: 2019-02-25

## 2019-02-25 DIAGNOSIS — N95.1 HOT FLUSHES, PERIMENOPAUSAL: ICD-10-CM

## 2019-02-25 DIAGNOSIS — N95.0 POSTMENOPAUSAL BLEEDING: Primary | ICD-10-CM

## 2019-02-25 RX ORDER — ESTRADIOL 0.04 MG/D
1 FILM, EXTENDED RELEASE TRANSDERMAL
Qty: 8 PATCH | Refills: 11 | Status: SHIPPED | OUTPATIENT
Start: 2019-02-25 | End: 2019-04-03

## 2019-02-25 RX ORDER — PROGESTERONE 200 MG/1
200 CAPSULE ORAL NIGHTLY
Qty: 30 CAPSULE | Refills: 11 | Status: SHIPPED | OUTPATIENT
Start: 2019-02-25 | End: 2019-12-08 | Stop reason: SDUPTHER

## 2019-02-28 NOTE — PROGRESS NOTES
I have reviewed and concur with the resident's history, physical, assessment, and plan.  I have personally interviewed and examined the patient    at bedside.  See below addendum for my evaluation and additional findings.

## 2019-03-08 ENCOUNTER — TELEPHONE (OUTPATIENT)
Dept: OBSTETRICS AND GYNECOLOGY | Facility: CLINIC | Age: 64
End: 2019-03-08

## 2019-03-12 ENCOUNTER — HOSPITAL ENCOUNTER (OUTPATIENT)
Dept: RADIOLOGY | Facility: OTHER | Age: 64
Discharge: HOME | End: 2019-03-12
Attending: OBSTETRICS & GYNECOLOGY
Payer: MEDICARE

## 2019-03-12 ENCOUNTER — HOSPITAL ENCOUNTER (OUTPATIENT)
Dept: RADIOLOGY | Facility: OTHER | Age: 64
Discharge: HOME | End: 2019-03-12
Attending: INTERNAL MEDICINE
Payer: MEDICARE

## 2019-03-12 DIAGNOSIS — Z78.0 POSTMENOPAUSAL: ICD-10-CM

## 2019-03-12 DIAGNOSIS — Z12.39 BREAST CANCER SCREENING: ICD-10-CM

## 2019-03-12 DIAGNOSIS — N95.0 POSTMENOPAUSAL BLEEDING: ICD-10-CM

## 2019-03-12 PROCEDURE — 76830 US PELVIS COMP WITH TRANSVAG NON-OB (XPD): ICD-10-PCS | Mod: 26,,, | Performed by: RADIOLOGY

## 2019-03-12 PROCEDURE — 77080 DXA BONE DENSITY AXIAL: CPT | Mod: 26,,, | Performed by: RADIOLOGY

## 2019-03-12 PROCEDURE — 77067 MAMMO DIGITAL SCREENING BILAT WITH TOMOSYNTHESIS_CAD: ICD-10-PCS | Mod: 26,,, | Performed by: RADIOLOGY

## 2019-03-12 PROCEDURE — 77067 SCR MAMMO BI INCL CAD: CPT | Mod: 26,,, | Performed by: RADIOLOGY

## 2019-03-12 PROCEDURE — 76830 TRANSVAGINAL US NON-OB: CPT | Mod: TC

## 2019-03-12 PROCEDURE — 77080 DXA BONE DENSITY AXIAL: CPT | Mod: TC

## 2019-03-12 PROCEDURE — 77080 DEXA BONE DENSITY SPINE HIP: ICD-10-PCS | Mod: 26,,, | Performed by: RADIOLOGY

## 2019-03-12 PROCEDURE — 77067 SCR MAMMO BI INCL CAD: CPT | Mod: TC

## 2019-03-12 PROCEDURE — 76856 US EXAM PELVIC COMPLETE: CPT | Mod: 26,,, | Performed by: RADIOLOGY

## 2019-03-12 PROCEDURE — 77063 MAMMO DIGITAL SCREENING BILAT WITH TOMOSYNTHESIS_CAD: ICD-10-PCS | Mod: 26,,, | Performed by: RADIOLOGY

## 2019-03-12 PROCEDURE — 76856 US PELVIS COMP WITH TRANSVAG NON-OB (XPD): ICD-10-PCS | Mod: 26,,, | Performed by: RADIOLOGY

## 2019-03-12 PROCEDURE — 76830 TRANSVAGINAL US NON-OB: CPT | Mod: 26,,, | Performed by: RADIOLOGY

## 2019-03-12 PROCEDURE — 77063 BREAST TOMOSYNTHESIS BI: CPT | Mod: 26,,, | Performed by: RADIOLOGY

## 2019-03-13 ENCOUNTER — TELEPHONE (OUTPATIENT)
Dept: OBSTETRICS AND GYNECOLOGY | Facility: CLINIC | Age: 64
End: 2019-03-13

## 2019-03-13 NOTE — TELEPHONE ENCOUNTER
Called patient to discuss U/S results. No answer, left VM. Will call back later today or tomorrow.

## 2019-03-14 ENCOUNTER — TELEPHONE (OUTPATIENT)
Dept: OBSTETRICS AND GYNECOLOGY | Facility: CLINIC | Age: 64
End: 2019-03-14

## 2019-03-14 NOTE — TELEPHONE ENCOUNTER
Spoke with patient. Reviewed U/S findings. EMS 4mm but small cystic foci CHARLES and trace endocervical fluid noted. Counseled that this may be result from scarring after D&C last year. Counseled that only way to rule out endometrial hyperplasia or cancer is to proceed with EMBx or D&C again, she declines. Reports that she has not had any further bleeding in a month. Bleeding was noted during high activity ([place] the mountains). She has not switched HRT (lower estrogen patch and higher progesterone pill) yet. She will let me know if she has more menopausal symptoms when she does. Strict PMB precautions reviewed.

## 2019-03-28 ENCOUNTER — PES CALL (OUTPATIENT)
Dept: ADMINISTRATIVE | Facility: CLINIC | Age: 64
End: 2019-03-28

## 2019-04-02 ENCOUNTER — PATIENT MESSAGE (OUTPATIENT)
Dept: OBSTETRICS AND GYNECOLOGY | Facility: CLINIC | Age: 64
End: 2019-04-02

## 2019-04-02 DIAGNOSIS — N95.1 MENOPAUSAL STATE: Primary | ICD-10-CM

## 2019-04-03 RX ORDER — ESTRADIOL 0.05 MG/D
1 FILM, EXTENDED RELEASE TRANSDERMAL
Qty: 24 PATCH | Refills: 3 | Status: SHIPPED | OUTPATIENT
Start: 2019-04-04 | End: 2020-04-03

## 2019-05-21 ENCOUNTER — IMMUNIZATION (OUTPATIENT)
Dept: PHARMACY | Facility: CLINIC | Age: 64
End: 2019-05-21
Payer: MEDICARE

## 2019-05-21 ENCOUNTER — OFFICE VISIT (OUTPATIENT)
Dept: INTERNAL MEDICINE | Facility: CLINIC | Age: 64
End: 2019-05-21
Payer: MEDICARE

## 2019-05-21 VITALS
BODY MASS INDEX: 28.7 KG/M2 | HEIGHT: 71 IN | WEIGHT: 205 LBS | HEART RATE: 83 BPM | DIASTOLIC BLOOD PRESSURE: 62 MMHG | SYSTOLIC BLOOD PRESSURE: 106 MMHG

## 2019-05-21 DIAGNOSIS — Z00.00 ENCOUNTER FOR PREVENTIVE HEALTH EXAMINATION: Primary | ICD-10-CM

## 2019-05-21 DIAGNOSIS — N95.1 HOT FLASHES DUE TO MENOPAUSE: ICD-10-CM

## 2019-05-21 DIAGNOSIS — Z12.11 SCREEN FOR COLON CANCER: ICD-10-CM

## 2019-05-21 DIAGNOSIS — Z85.820 HISTORY OF MELANOMA: ICD-10-CM

## 2019-05-21 DIAGNOSIS — M25.50 GENERALIZED JOINT PAIN: ICD-10-CM

## 2019-05-21 DIAGNOSIS — G47.9 SLEEP DIFFICULTIES: ICD-10-CM

## 2019-05-21 DIAGNOSIS — E03.9 HYPOTHYROIDISM, UNSPECIFIED TYPE: ICD-10-CM

## 2019-05-21 DIAGNOSIS — N18.30 STAGE 3 CHRONIC KIDNEY DISEASE: ICD-10-CM

## 2019-05-21 DIAGNOSIS — M54.2 NECK PAIN ON RIGHT SIDE: ICD-10-CM

## 2019-05-21 PROBLEM — R21 RASH: Status: RESOLVED | Noted: 2018-11-05 | Resolved: 2019-05-21

## 2019-05-21 PROCEDURE — G0439 PR MEDICARE ANNUAL WELLNESS SUBSEQUENT VISIT: ICD-10-PCS | Mod: S$GLB,,, | Performed by: NURSE PRACTITIONER

## 2019-05-21 PROCEDURE — 99499 RISK ADDL DX/OHS AUDIT: ICD-10-PCS | Mod: S$GLB,,, | Performed by: NURSE PRACTITIONER

## 2019-05-21 PROCEDURE — 99999 PR PBB SHADOW E&M-EST. PATIENT-LVL V: CPT | Mod: PBBFAC,,, | Performed by: NURSE PRACTITIONER

## 2019-05-21 PROCEDURE — 99999 PR PBB SHADOW E&M-EST. PATIENT-LVL V: ICD-10-PCS | Mod: PBBFAC,,, | Performed by: NURSE PRACTITIONER

## 2019-05-21 PROCEDURE — 99499 UNLISTED E&M SERVICE: CPT | Mod: S$GLB,,, | Performed by: NURSE PRACTITIONER

## 2019-05-21 PROCEDURE — G0439 PPPS, SUBSEQ VISIT: HCPCS | Mod: S$GLB,,, | Performed by: NURSE PRACTITIONER

## 2019-05-21 NOTE — PATIENT INSTRUCTIONS
Counseling and Referral of Other Preventative  (Italic type indicates deductible and co-insurance are waived)    Patient Name: Sara David  Today's Date: 5/21/2019    Health Maintenance       Date Due Completion Date    TETANUS VACCINE 07[birth year]02/1971 Need to obtain    Colonoscopy 10/29/2011 10/29/2008    Shingles Vaccine (2 of 3) 01/13/2015 11/18/2014    Pneumococcal Vaccine (65+ Low/Medium Risk) (1 of 2 - PCV13) 07/02/2018 Obtain Prevnar    Influenza Vaccine 08/01/2019 11/3/2018    Mammogram 03/12/2021 3/12/2019    DEXA SCAN 03/12/2022 3/12/2019    Lipid Panel 12/04/2023 12/4/2018        No orders of the defined types were placed in this encounter.    The following information is provided to all patients.  This information is to help you find resources for any of the problems found today that may be affecting your health:                Living healthy guide: www.[facility].[place].[facility]      Understanding Diabetes: www.diabetes.org      Eating healthy: www.cdc.gov/healthyweight      CDC home safety checklist: www.cdc.gov/steadi/patient.html      Agency on Aging: www.goea.[place].[facility]      Alcoholics anonymous (AA): www.aa.org      Physical Activity: www.patti.nih.gov/vz6xidz      Tobacco use: www.quitwithusla.org

## 2019-05-21 NOTE — PROGRESS NOTES
"  I offered to discuss end of life issues, including information on how to make advance directives that the patient could use to name someone who would make medical decisions on their behalf if they became too ill to make themselves.    ___Patient declined  _X_Reports she has Advanced Directives - advised to scan  into Patient Portal      Sara David presented for a  Medicare AWV and comprehensive Health Risk Assessment today. The following components were reviewed and updated:    · Medical history  · Family History  · Social history  · Allergies and Current Medications  · Health Risk Assessment  · Health Maintenance  · Care Team     ** See Completed Assessments for Annual Wellness Visit within the encounter summary.**       The following assessments were completed:  · Living Situation  · CAGE  · Depression Screening  · Timed Get Up and Go  · Whisper Test  · Cognitive Function Screening  ·   ·   ·   · Nutrition Screening  · ADL Screening  · PAQ Screening    Vitals:    05/21/19 1403   BP: 106/62   BP Location: Right arm   Pulse: 83   Weight: 93 kg (205 lb 0.4 oz)   Height: 5' 11" (1.803 m)     Body mass index is 28.6 kg/m².  Physical Exam   Constitutional: She is oriented to person, place, and time. She appears well-developed and well-nourished.   HENT:   Head: Normocephalic.   Cardiovascular: Normal rate and regular rhythm.   Pulmonary/Chest: Effort normal and breath sounds normal.   Abdominal: Soft. Bowel sounds are normal.   Musculoskeletal: Normal range of motion. She exhibits no edema.   Neurological: She is alert and oriented to person, place, and time.   Skin: Skin is warm.   Psychiatric: She has a normal mood and affect.   Nursing note and vitals reviewed.        Diagnoses and health risks identified today and associated recommendations/orders:    1. Encounter for preventive health examination  Here for Health Risk Assessment/Annual Wellness Visit.  Health maintenance reviewed and updated. Follow up in one " year.  Prescription given for TDAP, Prevnar, Shingrix2    2. History of melanoma  - Ambulatory Referral to Dermatology    3. Screen for colon cancer  - Case request GI: COLONOSCOPY    4. Hypothyroidism, unspecified type  Chronic, stable on current medications. Followed by PCP.    5. Stage 3 chronic kidney disease  Chronic, stable. Followed by PCP    6. Hot flashes due to menopause  Chronic, stable on current medications. Followed by Gynecology.    7. Sleep difficulties  Chronic, stable on current medication. Followed by PCP    8. Generalized joint pain  Chronic, reports worsening generalized joint pain - back, fingers. Requesting evaluation  - Ambulatory Referral to Rheumatology    9. Neck pain on right side  Chronic, reports persistent neck pain, slight improvement since seen in Urgent Care. Requesting evaluation - referral Rheumatology.      Provided Sara with a 5-10 year written screening schedule and personal prevention plan. Recommendations were developed using the USPSTF age appropriate recommendations. Education, counseling, and referrals were provided as needed. After Visit Summary printed and given to patient which includes a list of additional screenings\tests needed.    Follow up in about 5 months (around 11/3/2019).    Azalia Orantes NP

## 2019-09-03 ENCOUNTER — HOSPITAL ENCOUNTER (OUTPATIENT)
Dept: RADIOLOGY | Facility: HOSPITAL | Age: 64
Discharge: HOME | End: 2019-09-03
Attending: INTERNAL MEDICINE
Payer: MEDICARE

## 2019-09-03 ENCOUNTER — INITIAL CONSULT (OUTPATIENT)
Dept: RHEUMATOLOGY | Facility: CLINIC | Age: 64
End: 2019-09-03
Payer: MEDICARE

## 2019-09-03 VITALS
HEART RATE: 84 BPM | WEIGHT: 207.25 LBS | SYSTOLIC BLOOD PRESSURE: 114 MMHG | BODY MASS INDEX: 29.02 KG/M2 | DIASTOLIC BLOOD PRESSURE: 70 MMHG | HEIGHT: 71 IN

## 2019-09-03 DIAGNOSIS — M25.511 STERNOCLAVICULAR JOINT PAIN, RIGHT: Primary | ICD-10-CM

## 2019-09-03 DIAGNOSIS — M25.511 STERNOCLAVICULAR JOINT PAIN, RIGHT: ICD-10-CM

## 2019-09-03 PROCEDURE — 71130 XR STERNOCLAVICULAR JOINTS MIN 3 VIEW: ICD-10-PCS | Mod: 26,,, | Performed by: RADIOLOGY

## 2019-09-03 PROCEDURE — 1101F PT FALLS ASSESS-DOCD LE1/YR: CPT | Mod: CPTII,S$GLB,, | Performed by: INTERNAL MEDICINE

## 2019-09-03 PROCEDURE — 99999 PR PBB SHADOW E&M-EST. PATIENT-LVL IV: CPT | Mod: PBBFAC,,, | Performed by: INTERNAL MEDICINE

## 2019-09-03 PROCEDURE — 99204 OFFICE O/P NEW MOD 45 MIN: CPT | Mod: S$GLB,,, | Performed by: INTERNAL MEDICINE

## 2019-09-03 PROCEDURE — 71130 X-RAY STRENOCLAVIC JT 3/>VWS: CPT | Mod: TC

## 2019-09-03 PROCEDURE — 1101F PR PT FALLS ASSESS DOC 0-1 FALLS W/OUT INJ PAST YR: ICD-10-PCS | Mod: CPTII,S$GLB,, | Performed by: INTERNAL MEDICINE

## 2019-09-03 PROCEDURE — 71130 X-RAY STRENOCLAVIC JT 3/>VWS: CPT | Mod: 26,,, | Performed by: RADIOLOGY

## 2019-09-03 PROCEDURE — 99204 PR OFFICE/OUTPT VISIT, NEW, LEVL IV, 45-59 MIN: ICD-10-PCS | Mod: S$GLB,,, | Performed by: INTERNAL MEDICINE

## 2019-09-03 PROCEDURE — 99999 PR PBB SHADOW E&M-EST. PATIENT-LVL IV: ICD-10-PCS | Mod: PBBFAC,,, | Performed by: INTERNAL MEDICINE

## 2019-09-03 ASSESSMENT — ROUTINE ASSESSMENT OF PATIENT INDEX DATA (RAPID3)
AM STIFFNESS SCORE: 1, YES
MDHAQ FUNCTION SCORE: .1
PATIENT GLOBAL ASSESSMENT SCORE: 1.5
PSYCHOLOGICAL DISTRESS SCORE: 1.1
WHEN YOU AWAKENED IN THE MORNING OVER THE LAST WEEK, PLEASE INDICATE THE AMOUNT OF TIME IT TAKES UNTIL YOU ARE AS LIMBER AS YOU WILL BE FOR THE DAY: 30MIN
TOTAL RAPID3 SCORE: 1.94
PAIN SCORE: 4
FATIGUE SCORE: 0

## 2019-09-03 NOTE — PROGRESS NOTES
Chief Complaint   Patient presents with    Disease Management       Patient was referred by Azalia     History of presenting illness    64 year old female has had a bone sticking out on the right chest wall    She thinks it happened within a month  There is some right supraclavicular lump  She cant raise the right arm above the head,it hurts the shoulder  Right arm hurts a bit and it goes down     Hands : some bony lumps     Neck has degenerative arthritis     Hips have had bursitis     Some knee arthralgias     She does water aerobics and also does weight bearing activity     Xray neck and soft tissue    C1-C2: Pre-dens space is maintained.    Alignment: Alignment is maintained.  Lordosis is maintained.    Vertebrae: Vertebral body heights are maintained.  No suspicious appearing lytic or blastic lesions.    Discs and facets: There is mild facet arthropathy and degenerative disc disease with disc height loss most pronounced at C4 through C6.    Miscellaneous: Calcified density noted in the expected area of the right thyroid gland.  No abnormal soft tissue swelling.    ESR,CRP nml    Past history : thyroid disease,endometriosis,melanoma     Family history  Sister has bone cancer   htn  Breast cancer   Parkinsonism  Lymphoma  MS  Arthritis  Back issues     Social history  Not a smoker       Review of Systems       No skin rashes,malar rash,photosensitivity   No telangiectasias   No calcinosis   No psoriasis   No patchy alopecia   No oral and nasal ulcers   No dry eyes and dry mouth   No enlarged glands and nodes  No pleurisy or any cardiopulmonary complaints   No dysphagia,diplopia and dysphonia and muscle weakness   No n/v/d/c   No acid reflux+   No raynaud's+   No digital ulcers   No cytopenias   No renal issues   No blood clots   No fever,chills,night sweats,weight loss and loss of appetite   No pregnancy losses/pre term deliveries /pregnancy complications   No new onset headaches   No recurrent conjunctivitis or  uveitis or scleritis or episcleritis   No chronic or bloody diarrhea with no u colitis or crohn's /inflammatory bowel disease   No vaginal and urethral  d/c/STDs/no ulcers     Physical Exam     ENGLISH-28 tender joint count: 0  ENGLISH-28 swollen joint count: 0     There is prominence of the medial end of the clavicle at the right sternoclavicular joint  The joint is very tender  So is the clavicle tender along its course   There is some soft tissue prominence above the clavicle     Physical Exam   Constitutional: She is oriented to person, place, and time and well-developed, well-nourished, and in no distress. No distress.   HENT:   Head: Normocephalic.   Mouth/Throat: Oropharynx is clear and moist.   Eyes: Conjunctivae are normal. Pupils are equal, round, and reactive to light. Right eye exhibits no discharge. Left eye exhibits no discharge. No scleral icterus.   Neck: Normal range of motion. No thyromegaly present.   Cardiovascular: Normal rate, regular rhythm, normal heart sounds and intact distal pulses.    Pulmonary/Chest: Effort normal and breath sounds normal. No stridor.   Abdominal: Soft. Bowel sounds are normal.   Lymphadenopathy:     She has no cervical adenopathy.   Neurological: She is alert and oriented to person, place, and time.   Skin: Skin is warm. No rash noted. She is not diaphoretic.     Psychiatric: Affect and judgment normal.   Musculoskeletal: Normal range of motion.         Assessment       64 year old female has had a bone sticking out on the right chest wall      Past history : thyroid disease,endometriosis,melanoma     She thinks it happened within a month  There is some right supraclavicular lump  She cant raise the right arm above the head,it hurts the shoulder  Right arm hurts a bit and it goes down     She has no major joint problems otherwise  Hands have some OA changes  Neck has mild deg arthritis  Hips have had bursitis     She does water aerobics and also does weight bearing activity      Xray neck and soft tissue only showed mild C spine deg changes and calcified thyroid  ESR,CRP nml    On exam she has :    There is prominence of the medial end of the clavicle at the right sternoclavicular joint  The joint is very tender  So is the clavicle tender along its course   There is some soft tissue prominence above the clavicle     Suspect right sternoclavicular joint dislocation      1. Sternoclavicular joint pain, right            New problem     Plan    Will get sternoclavicular joint xrays today    Refer her to sports medicine for an evaluation    Sara was seen today for disease management.    Diagnoses and all orders for this visit:    Sternoclavicular joint pain, right  -     Ambulatory Referral to Orthopedics  -     Cancel: X-Ray Sternoclavicular Joints Min 3 View; Future  -     X-Ray Sternoclavicular Joints Min 3 View; Future

## 2019-09-03 NOTE — LETTER
September 3, 2019      Azalia Orantes, NP  [address]  [facility] [address]           [facility]  [address]  [facility] [address]  Phone: [phone]  Fax: [phone]          Patient: Sara David   MR Number: [id number]   YOB: 1955   Date of Visit: 9/3/2019       Dear Azalia Orantes:    Thank you for referring Sara David to me for evaluation. Attached you will find relevant portions of my assessment and plan of care.    If you have questions, please do not hesitate to call me. I look forward to following Sara David along with you.    Sincerely,    Rigo Vasquez MD    Enclosure  CC:  No Recipients    If you would like to receive this communication electronically, please contact [email or web address] or [phone] to request more information on [employer] Link access.    For providers and/or their staff who would like to refer a patient to [facility], please contact us through our one-stop-shop provider referral line, [facility], at [phone].    If you feel you have received this communication in error or would no longer like to receive these types of communications, please e-mail [email or web address]

## 2019-09-12 ENCOUNTER — OFFICE VISIT (OUTPATIENT)
Dept: SPORTS MEDICINE | Facility: CLINIC | Age: 64
End: 2019-09-12
Payer: MEDICARE

## 2019-09-12 VITALS
HEART RATE: 88 BPM | WEIGHT: 207 LBS | SYSTOLIC BLOOD PRESSURE: 112 MMHG | HEIGHT: 71 IN | BODY MASS INDEX: 28.98 KG/M2 | DIASTOLIC BLOOD PRESSURE: 70 MMHG

## 2019-09-12 DIAGNOSIS — M25.511 RIGHT SHOULDER PAIN, UNSPECIFIED CHRONICITY: Primary | ICD-10-CM

## 2019-09-12 PROCEDURE — 99999 PR PBB SHADOW E&M-EST. PATIENT-LVL III: CPT | Mod: PBBFAC,,, | Performed by: ORTHOPAEDIC SURGERY

## 2019-09-12 PROCEDURE — 99203 PR OFFICE/OUTPT VISIT, NEW, LEVL III, 30-44 MIN: ICD-10-PCS | Mod: S$GLB,,, | Performed by: ORTHOPAEDIC SURGERY

## 2019-09-12 PROCEDURE — 1101F PR PT FALLS ASSESS DOC 0-1 FALLS W/OUT INJ PAST YR: ICD-10-PCS | Mod: CPTII,S$GLB,, | Performed by: ORTHOPAEDIC SURGERY

## 2019-09-12 PROCEDURE — 1101F PT FALLS ASSESS-DOCD LE1/YR: CPT | Mod: CPTII,S$GLB,, | Performed by: ORTHOPAEDIC SURGERY

## 2019-09-12 PROCEDURE — 99999 PR PBB SHADOW E&M-EST. PATIENT-LVL III: ICD-10-PCS | Mod: PBBFAC,,, | Performed by: ORTHOPAEDIC SURGERY

## 2019-09-12 PROCEDURE — 99203 OFFICE O/P NEW LOW 30 MIN: CPT | Mod: S$GLB,,, | Performed by: ORTHOPAEDIC SURGERY

## 2019-09-12 NOTE — PROGRESS NOTES
CC: RIGHT shoulder pain     64 y.o. Female who presents with R shoulder pain since 2/2019 when she developed R SCJ prominence atraumatically.  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  Has pain at SCJ and periscapular.  It is worsening.  Has not had treatment.    She reports that the pain and weakness is worse with overhead activity. It also bothers her at night.    Is affecting ADLs.        Past Medical History:   Diagnosis Date    Endometriosis     Hypothyroidism     dx in her 40s, enlarged thyroid, did have FNA of thyroid nodule which was benign    Melanoma 1990, 2018 1990s - on back; 2018 - L upper arm    Miscarriage        Past Surgical History:   Procedure Laterality Date    BACK SURGERY      DILATION AND CURETTAGE OF UTERUS      History of polyps    FNA thyroid      in her 40s    [id number]-[id number]-LXASEYHEK N/A 4/13/2018    [facility]    MALIGNANT SKIN LESION EXCISION      1990s, 2018    SALPINGOOPHORECTOMY      WISDOM TOOTH EXTRACTION         Family History   Problem Relation Age of Onset    Hypertension Father     Breast cancer Sister     Cancer Sister     Bone cancer Sister     Parkinsonism Sister     Multiple sclerosis Sister     Lymphoma Maternal Aunt     Arthritis Mother     Chronic back pain Brother     Colon cancer Neg Hx     Diabetes Neg Hx     Miscarriages / Stillbirths Neg Hx     Stroke Neg Hx          Current Outpatient Medications:     cetirizine (ZYRTEC) 10 MG tablet, Take 10 mg by mouth once daily., Disp: , Rfl:     estradiol 0.05 mg/24 hr td ptsw (VIVELLE-DOT) 0.05 mg/24 hr, Place 1 patch onto the skin twice a week., Disp: 24 patch, Rfl: 3    levothyroxine (SYNTHROID) 88 MCG tablet, before breakfast. , Disp: , Rfl:     traZODone (DESYREL) 50 MG tablet, nightly as needed. , Disp: , Rfl:     progesterone (PROMETRIUM) 200 MG capsule, Take 1 capsule (200 mg total) by mouth every  "evening. for 10 days, Disp: 30 capsule, Rfl: 11    Review of patient's allergies indicates:   Allergen Reactions    Iodinated contrast media           REVIEW OF SYSTEMS:  Constitution: Negative. Negative for chills, fever and night sweats.   HENT: Negative for congestion and headaches.    Eyes: Negative for blurred vision, left vision loss and right vision loss.   Cardiovascular: Negative for chest pain and syncope.   Respiratory: Negative for cough and shortness of breath.    Endocrine: Negative for polydipsia, polyphagia and polyuria.   Hematologic/Lymphatic: Negative for bleeding problem. Does not bruise/bleed easily.   Skin: Negative for dry skin, itching and rash.   Musculoskeletal: Negative for falls.  Positive for right shoulder pain and muscle weakness.   Gastrointestinal: Negative for abdominal pain and bowel incontinence.   Genitourinary: Negative for bladder incontinence and nocturia.   Neurological: Negative for disturbances in coordination, loss of balance and seizures.   Psychiatric/Behavioral: Negative for depression. The patient does not have insomnia.    Allergic/Immunologic: Negative for hives and persistent infections.      PHYSICAL EXAMINATION:  Vitals:  /70   Pulse 88   Ht 5' 11" (1.803 m)   Wt 93.9 kg (207 lb)   LMP  (LMP Unknown)   BMI 28.87 kg/m²    General: The patient is alert and oriented x 3.  Mood is pleasant.  Observation of ears, eyes and nose reveal no gross abnormalities.  No labored breathing observed.  Gait is coordinated. Patient can toe walk and heel walk without difficulty.      RIGHT SHOULDER / UPPER EXTREMITY EXAM    OBSERVATION:     Swelling  none  Deformity  none   Discoloration  none   Scapular winging none   Scars   none  Atrophy  none    TENDERNESS / CREPITUS (T/C):          T/C      T/C   Clavicle   -/-  SUPRAspinatus    +/-     AC Jt.    +/-  INFRAspinatus  +/-    SC Jt.    +/-  Deltoid    -/-      G. Tuberosity  -/-  LH BICEP " groove  -/-   Acromion:  -/-  Midline Neck   -/-     Scapular Spine -/-  Trapezium   +/-   SMA Scapula  -/-  GH jt. line - post  -/-     Scapulothoracic  -/-         ROM: (* = with pain)  Left shoulder   Right shoulder        AROM (PROM)   AROM (PROM)   FE    170° (175°)     170° (175°)     ER at 0°    60°  (65°)    60°  (65°)   ER at 90° ABD  90°  (90°)    90°  (90°)   IR at 90°  ABD   NA  (40°)     NA  (40°)      IR (spine level)   T10     T10    STRENGTH: (* = with pain) Left shoulder   Right shoulder   SCAPTION   5/5    5/5    IR    5/5    5/5   ER    5/5    5/5   BICEPS   5/5    5/5   Deltoid    5/5    5/5     SIGNS:  Painful side       NEER   -    OKELSIES  neg    MCDONALD   -    SPEEDS  neg     DROP ARM   -   BELLY PRESS neg   Superior escape none    LIFT-OFF  neg   X-Body ADD    neg    MOVING VALGUS neg        STABILITY TESTING    Left shoulder   Right shoulder    Translation     Anterior  up face     up face    Posterior  up face    up face    Sulcus   < 10mm    < 10 mm     Signs   Apprehension   neg      neg       Relocation   no change     no change      Jerk test  neg     neg    EXTREMITY NEURO-VASCULAR EXAM:    Sensation grossly intact to light touch all dermatomal regions.    DTR 2+ Biceps, Triceps, BR and Negative Ethans sign   Grossly intact motor function at Elbow, Wrist and Hand   Distal pulses radial and ulnar 2+, brisk cap refill, symmetric.      NECK:  Painless FROM and spinous processes non-tender. Negative Spurlings sign.      OTHER FINDINGS:  Moderate scapular dyskinesia    XRAYS:  Xrays including AP, Outlet and Axillary Lateral of shoulder are ordered / images reviewed by me:   No fracture dislocation or other pathology   Acromion type 2   Proximal migration of humeral head: None   GH arthritis: None          ASSESSMENT:   R SCJ Dislocation    PLAN:    1. Recommend PT for periscapular strengthening.  2. F/u 6 weeks.    All questions were answered, patient will contact us for  questions or concerns in the interim.

## 2019-09-12 NOTE — LETTER
September 12, 2019      Rigo Vasquez MD  [address] [facility] [address]           [facility]  [address] [facility] [address]  Phone: [phone]          Patient: Sara David   MR Number: [id number]   YOB: 1955   Date of Visit: 9/12/2019       Dear Dr. Rigo Vasquez:    Thank you for referring Sara David to me for evaluation. Attached you will find relevant portions of my assessment and plan of care.    If you have questions, please do not hesitate to call me. I look forward to following Sara David along with you.    Sincerely,    Jatin Hair MD    Enclosure  CC:  No Recipients    If you would like to receive this communication electronically, please contact [email or web address] or [phone] to request more information on [employer] Link access.    For providers and/or their staff who would like to refer a patient to [facility], please contact us through our one-stop-shop provider referral line, [facility] [place], at [phone].    If you feel you have received this communication in error or would no longer like to receive these types of communications, please e-mail [email or web address]

## 2019-09-17 ENCOUNTER — PATIENT OUTREACH (OUTPATIENT)
Dept: ADMINISTRATIVE | Facility: OTHER | Age: 64
End: 2019-09-17

## 2019-09-19 ENCOUNTER — INITIAL CONSULT (OUTPATIENT)
Dept: DERMATOLOGY | Facility: CLINIC | Age: 64
End: 2019-09-19
Payer: MEDICARE

## 2019-09-19 DIAGNOSIS — D22.60 MULTIPLE BENIGN MELANOCYTIC NEVI OF UPPER EXTREMITY, LOWER EXTREMITY, AND TRUNK: ICD-10-CM

## 2019-09-19 DIAGNOSIS — L82.1 SEBORRHEIC KERATOSIS: ICD-10-CM

## 2019-09-19 DIAGNOSIS — Z86.006 HISTORY OF MELANOMA IN SITU: ICD-10-CM

## 2019-09-19 DIAGNOSIS — D22.70 MULTIPLE BENIGN MELANOCYTIC NEVI OF UPPER EXTREMITY, LOWER EXTREMITY, AND TRUNK: ICD-10-CM

## 2019-09-19 DIAGNOSIS — L81.4 LENTIGINES: ICD-10-CM

## 2019-09-19 DIAGNOSIS — B37.2 CANDIDAL INTERTRIGO: Primary | ICD-10-CM

## 2019-09-19 DIAGNOSIS — L90.5 SCAR: ICD-10-CM

## 2019-09-19 DIAGNOSIS — D22.5 MULTIPLE BENIGN MELANOCYTIC NEVI OF UPPER EXTREMITY, LOWER EXTREMITY, AND TRUNK: ICD-10-CM

## 2019-09-19 DIAGNOSIS — D22.30 MELANOCYTIC NEVI OF FACE: ICD-10-CM

## 2019-09-19 PROCEDURE — 1101F PR PT FALLS ASSESS DOC 0-1 FALLS W/OUT INJ PAST YR: ICD-10-PCS | Mod: CPTII,S$GLB,, | Performed by: DERMATOLOGY

## 2019-09-19 PROCEDURE — 99203 PR OFFICE/OUTPT VISIT, NEW, LEVL III, 30-44 MIN: ICD-10-PCS | Mod: S$GLB,,, | Performed by: DERMATOLOGY

## 2019-09-19 PROCEDURE — 99203 OFFICE O/P NEW LOW 30 MIN: CPT | Mod: S$GLB,,, | Performed by: DERMATOLOGY

## 2019-09-19 PROCEDURE — 1101F PT FALLS ASSESS-DOCD LE1/YR: CPT | Mod: CPTII,S$GLB,, | Performed by: DERMATOLOGY

## 2019-09-19 RX ORDER — KETOCONAZOLE 20 MG/G
CREAM TOPICAL
Qty: 15 G | Refills: 1 | Status: SHIPPED | OUTPATIENT
Start: 2019-09-19 | End: 2020-11-17

## 2019-09-19 RX ORDER — TRIAMCINOLONE ACETONIDE 0.25 MG/G
CREAM TOPICAL
Qty: 15 G | Refills: 1 | Status: SHIPPED | OUTPATIENT
Start: 2019-09-19 | End: 2020-11-17

## 2019-09-19 NOTE — PROGRESS NOTES
Subjective:       Patient ID:  Sara David is a 64 y.o. female who presents for   Chief Complaint   Patient presents with    Mole     diffuse, no change     Lesion     left forearm, rough edges      Mole  - Initial  Affected locations: diffuse  Duration: 66 years  Signs / symptoms: asymptomatic  Severity: mild  Timing: constant  Aggravated by: nothing    Lesion  - Initial  Affected locations: left arm  Duration: 20 years  Signs and Symptoms: looks funny.  Severity: mild  Timing: constant  Aggravated by: nothing      Review of Systems   Skin: Positive for wears hat. Negative for daily sunscreen use and activity-related sunscreen use.   Hematologic/Lymphatic: Does not bruise/bleed easily.        Objective:    Physical Exam   Constitutional: She appears well-developed and well-nourished. No distress.   HENT:   Mouth/Throat: Lips normal.    Eyes: Lids are normal.  No conjunctival no injection.   Lymphadenopathy: No supraclavicular adenopathy is present.     She has no cervical adenopathy.     She has no axillary adenopathy.     She has no inguinal adenopathy.   Neurological: She is alert and oriented to person, place, and time. She is not disoriented.   Psychiatric: She has a normal mood and affect.   Skin:   Areas Examined (abnormalities noted in diagram):   Scalp / Hair Palpated and Inspected  Head / Face Inspection Performed  Neck Inspection Performed  Chest / Axilla Inspection Performed  Abdomen Inspection Performed  Genitals / Buttocks / Groin Inspection Performed  Back Inspection Performed  RUE Inspected  LUE Inspection Performed  RLE Inspected  LLE Inspection Performed  Nails and Digits Inspection Performed  Gland Inspection Performed                       Diagram Legend     Erythematous scaling macule/papule c/w actinic keratosis       Vascular papule c/w angioma      Pigmented verrucoid papule/plaque c/w seborrheic keratosis      Yellow umbilicated papule c/w sebaceous hyperplasia      Irregularly  shaped tan macule c/w lentigo     1-2 mm smooth white papules consistent with Milia      Movable subcutaneous cyst with punctum c/w epidermal inclusion cyst      Subcutaneous movable cyst c/w pilar cyst      Firm pink to brown papule c/w dermatofibroma      Pedunculated fleshy papule(s) c/w skin tag(s)      Evenly pigmented macule c/w junctional nevus     Mildly variegated pigmented, slightly irregular-bordered macule c/w mildly atypical nevus      Flesh colored to evenly pigmented papule c/w intradermal nevus       Pink pearly papule/plaque c/w basal cell carcinoma      Erythematous hyperkeratotic cursted plaque c/w SCC      Surgical scar with no sign of skin cancer recurrence      Open and closed comedones      Inflammatory papules and pustules      Verrucoid papule consistent consistent with wart     Erythematous eczematous patches and plaques     Dystrophic onycholytic nail with subungual debris c/w onychomycosis     Umbilicated papule    Erythematous-base heme-crusted tan verrucoid plaque consistent with inflamed seborrheic keratosis     Erythematous Silvery Scaling Plaque c/w Psoriasis     See annotation      Assessment / Plan:        Candidal intertrigo  -     ketoconazole (NIZORAL) 2 % cream; Apply to affected areas of groin BID prn rash.  Dispense: 15 g; Refill: 1  -     triamcinolone acetonide 0.025% (KENALOG) 0.025 % cream; Apply to affected areas of groin BID prn rash.  Dispense: 15 g; Refill: 1    Melanocytic nevi of face  Multiple benign melanocytic nevi of upper extremity, lower extremity, and trunk  Multiple benign-appearing nevi present on exam today. Reassurance provided. Counseled patient to periodically examine moles and return to clinic if any changes in size, shape, or color are noted or if it becomes symptomatic (bleeding, itching, pain, etc).    Lentigines  These are benign sun spots which should be monitored for changes. Daily sun protection will reduce the number of new lesions.   Patient  instructed in importance of daily broad-spectrum sunscreen use with SPF of at least 30. Sun avoidance and topical protection/protective clothing discussed.    Seborrheic keratosis  These are benign, inherited growths without a malignant potential. Reassurance given to patient. No treatment is necessary. Handout was provided.    Scar  History of melanoma in situ  Area of previous melanoma examined. Site well-healed with no signs of recurrence.  No lymphadenopathy palpated on exam today.  Total body skin examination performed today including at least 12 points as noted in physical examination. Recommended continuing routine skin exams as well as routine checks with ophthalmology, dentist, and OB/GYN (if female) for any concerning lesions.  Patient instructed in importance of daily broad-spectrum sunscreen use with SPF of at least 30. Sun avoidance and topical protection/protective clothing discussed.    Follow up in about 4 weeks (around 10/17/2019).

## 2019-09-19 NOTE — LETTER
September 19, 2019      Azalia Orantes, NP  [address]  [facility] [address]           [facility] - Dermatology  [address] [place] [address]  Phone: [phone]  Fax: [phone]          Patient: Sara David   MR Number: [id number]   YOB: 1955   Date of Visit: 9/19/2019       Dear Azalia Orantes:    Thank you for referring Sara David to me for evaluation. Attached you will find relevant portions of my assessment and plan of care.    If you have questions, please do not hesitate to call me. I look forward to following Sara David along with you.    Sincerely,    Mary Jo Sotomayor MD    Enclosure  CC:  No Recipients    If you would like to receive this communication electronically, please contact [email or web address] or [phone] to request more information on [employer] Link access.    For providers and/or their staff who would like to refer a patient to [facility], please contact us through our one-stop-shop provider referral line, [facility], at [phone].    If you feel you have received this communication in error or would no longer like to receive these types of communications, please e-mail [email or web address]

## 2019-10-03 ENCOUNTER — CLINICAL SUPPORT (OUTPATIENT)
Dept: REHABILITATION | Facility: OTHER | Age: 64
End: 2019-10-03
Attending: ORTHOPAEDIC SURGERY
Payer: MEDICARE

## 2019-10-03 DIAGNOSIS — M25.611 DECREASED RANGE OF MOTION OF RIGHT SHOULDER: ICD-10-CM

## 2019-10-03 DIAGNOSIS — M25.511 ACUTE PAIN OF RIGHT SHOULDER: ICD-10-CM

## 2019-10-03 DIAGNOSIS — M54.2 NECK PAIN: ICD-10-CM

## 2019-10-03 DIAGNOSIS — R29.898 SHOULDER WEAKNESS: ICD-10-CM

## 2019-10-03 PROCEDURE — G8984 CARRY CURRENT STATUS: HCPCS | Mod: CK,PN

## 2019-10-03 PROCEDURE — 97162 PT EVAL MOD COMPLEX 30 MIN: CPT | Mod: PN

## 2019-10-03 PROCEDURE — G8985 CARRY GOAL STATUS: HCPCS | Mod: CJ,PN

## 2019-10-03 PROCEDURE — 97110 THERAPEUTIC EXERCISES: CPT | Mod: PN

## 2019-10-03 NOTE — PATIENT INSTRUCTIONS
Sternocleidomastoid (SCM) Stretch        In sitting, place one hand over the front of your collarbone, then extend your head backwards and to each side.    Hold for 30 seconds. Perform 3 reps. Start once a day.    Copyright © 1000-3939 [employer]    SCAPULAR RETRACTIONS        Draw your shoulder blades back and down.    Hold for 5 seconds. Perform 20 reps, 2-3 times a day.    Copyright © 2220-8981 [employer]

## 2019-10-04 PROBLEM — R29.898 SHOULDER WEAKNESS: Status: ACTIVE | Noted: 2019-10-04

## 2019-10-04 PROBLEM — M54.2 NECK PAIN: Status: ACTIVE | Noted: 2019-10-04

## 2019-10-04 PROBLEM — M25.611 DECREASED RANGE OF MOTION OF RIGHT SHOULDER: Status: ACTIVE | Noted: 2019-10-04

## 2019-10-04 PROBLEM — M25.511 RIGHT SHOULDER PAIN: Status: ACTIVE | Noted: 2019-10-04

## 2019-10-07 NOTE — PLAN OF CARE
[facility]  Physical Therapy Initial Evaluation    Name: Sara David  Clinic Number: [id number]    Therapy Diagnosis:   Encounter Diagnoses   Name Primary?    Acute pain of right shoulder     Shoulder weakness     Decreased range of motion of right shoulder     Neck pain      Physician: Jatin Hair MD    Physician Orders: PT Eval and Treat   Medical Diagnosis: M25.511 (ICD-10-CM) - Right shoulder pain, unspecified chronicity  Evaluation Date: 10/3/2019  Authorization Period Expiration: 10/17/19  Plan of Care Certification Period: 11/28/19  Visit # / Visits authorized: 1/ 6    Time In: 3:05 PM  Time Out: 3:55 PM  Total Billable Time: 50 minutes    Precautions: Standard      Subjective   Date of onset: 2/2019  History of current condition - Elise is a 63 yo F referred to OP PT secondary R SC joint and periscapular pain. Patient reports that her R SC joint was dislocated in January and misdiagnosed. States she does not recall when she dislocated it. 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 Also changed her exercise routine and added planks. Also reports that she hurt her back this week.       Past Medical History:   Diagnosis Date    Colon polyp 2008    Endometriosis     History of melanoma in situ 4/17/2018    Left arm, s/p excision by Alexandre Gomez MD    Hypothyroidism     dx in her 40s, enlarged thyroid, did have FNA of thyroid nodule which was benign    Melanoma 1990, 2018 1990s - on back; 2018 - L upper arm    Miscarriage      Sara David  has a past surgical history that includes Back surgery; Knott tooth extraction; Dilation and curettage of uterus; Salpingoophorectomy; FNA thyroid; and Malignant skin lesion excision.    Sara has a current medication list which includes the following prescription(s): cetirizine, estradiol 0.05 mg/24 hr td ptsw, ketoconazole, levothyroxine, progesterone, trazodone, and triamcinolone acetonide  "0.025%.    Review of patient's allergies indicates:   Allergen Reactions    Iodinated contrast media         Imagin/3/19  X-ray  FINDINGS:  Three views: No fracture dislocation bone destruction seen.    Prior Therapy: no  Social History:  lives with their spouse  Occupation: retired . Volunteers with book sale  Prior Level of Function: I with amb and ADL  Current Level of Function: I with amb and ADL    Pain:  Current 1/10, worst 5/10, best 0/10   Location: right shoulder   Description: Aching  Aggravating Factors: reaching up, reaching behind.  Easing Factors: Advil. stop activity    Radicular symptoms: none    Sleep is disturbed. Sleeping position: sides    Patients structured exercise routine:  Water aerobic  Exercise routine prior to onset: water aerobics       Pts goals: "to learn what I can do or not do with this condition" stabilize the shoulder    Objective     Postural examination in standing:  - normal lumbar lordosis  - forward head  - forward shoulders    Postural examination in sitting:   - decreased lumbar lordosis  - forward head  - forward shoulders        UE MMT R L   C3 Cervical side bend 5/5 5/5   C4 Shoulder Shrug 5/5  5/5   Shoulder flexion 5/5 5/5   Shoulder abduction 5/5 5/5   Shoulder IR 5/5 5/5   Shoulder ER 4/5 5/5   C5 Elbow flexion 5/5 5/5   C7 Elbow extension 5/5 5/5   C6 Wrist extension 5/5 5/5   Wrist flexion 5/5 5/5   Scapular protraction 5/5 5/5   Mid Traps 3+/5 3+/5   Lower traps 3-/5 3-/5       UE Special Tests    Ayala-Shahram negative   Neer Impingement negative   Yergason's Positive L UE   Empty Can negative     Joint Mobility  R shoulder flex   140 deg  R shoulder abd  180 deg  R shoulder ER     75 deg  R shoulder IR       55 deg    Endurance is good.      CMS Impairment/Limitation/Restriction for FOTO School Survey    Therapist reviewed FOTO scores for Sara David on 10/3/2019.   FOTO documents entered into EPIC - see Media " "section.    Limitation Score: 41%  Category: Carrying    Current : CK = at least 40% but < 60% impaired, limited or restricted  Goal: CJ = at least 20% but < 40% impaired, limited or restricted         TREATMENT   Treatment Time In: 3:42 PM  Treatment Time Out: 3:52 PM  Total Treatment time separate from Evaluation time: 10 minutes    Elise received therapeutic exercises to develop strength, flexibility and posture for 10 minutes including:  scm stretch 3 x 30"  Scapular retractions 20 x 5"    Home Exercises Provided and Patient Education Provided     Education provided:   - Discussed the use of the [facility] for communication.      Written Home Exercises Provided: yes.  Exercises were reviewed and Elise was able to demonstrate them prior to the end of the session.  Elise demonstrated good  understanding of the education provided.     See EMR under Patient Instructions for exercises provided 10/3/2019.    Assessment   Sara is a 64 y.o. female referred to outpatient Physical Therapy with a medical diagnosis of M25.511 (ICD-10-CM) - Right shoulder pain, unspecified chronicity . Pt presents with decreased scapular stabilization, decreased R shoulder ROM, and SC joint dysfunction. Will proceed in OP PT with scapular stabilization and R UE strengthening to increase R shoulder AROM and decreased R UE/neck pain. Patient experiencing R sided neck pain in SCM muscle. May be a candidate for dry needling and KT taping.     Pt prognosis is Good.   Pt will benefit from skilled outpatient Physical Therapy to address the deficits stated above and in the chart below, provide pt/family education, and to maximize pt's level of independence.     Plan of care discussed with patient: Yes  Pt's spiritual, cultural and educational needs considered and patient is agreeable to the plan of care and goals as stated below:     Anticipated Barriers for therapy: none    Medical Necessity is demonstrated by the " following  History  Co-morbidities and personal factors that may impact the plan of care Co-morbidities:   possible/suspected SC dislocation    Personal Factors:   no deficits     moderate   Examination  Body Structures and Functions, activity limitations and participation restrictions that may impact the plan of care Body Regions:   upper extremities    Body Systems:    ROM  strength    Participation Restrictions:   none    Activity limitations:   Learning and applying knowledge  no deficits    General Tasks and Commands  no deficits    Communication  no deficits    Mobility  lifting and carrying objects    Self care  no deficits    Domestic Life  no deficits    Interactions/Relationships  no deficits    Life Areas  no deficits    Community and Social Life  no deficits         moderate   Clinical Presentation evolving clinical presentation with changing clinical characteristics moderate   Decision Making/ Complexity Score: moderate     Goals:  Short Term Goals: 4 weeks   1. Independent with HEP.  2. Report decreased R UE/neck < or = 3 /10 with adls such as reaching up, reaching behind.  3. Increased MMT for B UE by 1 muscle grade to promote proper scapular stability to decrease R UE/neck < or = 3 /10 with adls such as reaching up, reaching behind.  4. Increased AROM for R shoulder flexion to 170 deg.      Long Term Goals: 8 weeks   5. Report decreased R UE/neck < or =  1/10 with adls such asreaching up, reaching behind.   6. Increased MMT for B UE to 5/5 muscle grade to promote proper scapular stability to decrease R UE/neck < or =  1/10 with adls such asreaching up, reaching behind.    7. Patient to achieve CJ( at least 20% < 40% impaired, limited or restricted) level on the FOTO Outcomes Measurement System.    Plan   Certification Period/Plan of care expiration: 10/3/2019 to 11/28/19.    Outpatient Physical Therapy 2 times weekly for 8 weeks to include the following interventions: Manual Therapy, Moist Heat/ Ice,  Neuromuscular Re-ed, Paraffin, Patient Education, Therapeutic Activites, Therapeutic Exercise and dry needling, KT taping..     Buzz Gaxiola, PT

## 2019-10-18 ENCOUNTER — PATIENT OUTREACH (OUTPATIENT)
Dept: ADMINISTRATIVE | Facility: OTHER | Age: 64
End: 2019-10-18

## 2019-10-21 ENCOUNTER — OFFICE VISIT (OUTPATIENT)
Dept: DERMATOLOGY | Facility: CLINIC | Age: 64
End: 2019-10-21
Payer: MEDICARE

## 2019-10-21 ENCOUNTER — CLINICAL SUPPORT (OUTPATIENT)
Dept: URGENT CARE | Facility: CLINIC | Age: 64
End: 2019-10-21
Payer: MEDICARE

## 2019-10-21 DIAGNOSIS — L29.9 PRURITUS: Primary | ICD-10-CM

## 2019-10-21 DIAGNOSIS — D18.01 HEMANGIOMA OF SKIN: ICD-10-CM

## 2019-10-21 DIAGNOSIS — B37.2 CANDIDAL INTERTRIGO: ICD-10-CM

## 2019-10-21 DIAGNOSIS — Z23 NEEDS FLU SHOT: ICD-10-CM

## 2019-10-21 PROCEDURE — 99213 OFFICE O/P EST LOW 20 MIN: CPT | Mod: S$GLB,,, | Performed by: DERMATOLOGY

## 2019-10-21 PROCEDURE — G0008 ADMIN INFLUENZA VIRUS VAC: HCPCS | Mod: S$GLB,,, | Performed by: PHYSICIAN ASSISTANT

## 2019-10-21 PROCEDURE — 90686 IIV4 VACC NO PRSV 0.5 ML IM: CPT | Mod: S$GLB,,, | Performed by: PHYSICIAN ASSISTANT

## 2019-10-21 PROCEDURE — 90686 FLU VACCINE (QUAD) GREATER THAN OR EQUAL TO 3YO PRESERVATIVE FREE IM: ICD-10-PCS | Mod: S$GLB,,, | Performed by: PHYSICIAN ASSISTANT

## 2019-10-21 PROCEDURE — 99213 PR OFFICE/OUTPT VISIT, EST, LEVL III, 20-29 MIN: ICD-10-PCS | Mod: S$GLB,,, | Performed by: DERMATOLOGY

## 2019-10-21 PROCEDURE — 1101F PT FALLS ASSESS-DOCD LE1/YR: CPT | Mod: CPTII,S$GLB,, | Performed by: DERMATOLOGY

## 2019-10-21 PROCEDURE — G0008 FLU VACCINE (QUAD) GREATER THAN OR EQUAL TO 3YO PRESERVATIVE FREE IM: ICD-10-PCS | Mod: S$GLB,,, | Performed by: PHYSICIAN ASSISTANT

## 2019-10-21 PROCEDURE — 1101F PR PT FALLS ASSESS DOC 0-1 FALLS W/OUT INJ PAST YR: ICD-10-PCS | Mod: CPTII,S$GLB,, | Performed by: DERMATOLOGY

## 2019-10-21 NOTE — PROGRESS NOTES
Subjective:       Patient ID:  Sara David is a 64 y.o. female who presents for   Chief Complaint   Patient presents with    Itching     back, itching not sure if rash is present      Itching  - Initial  Affected locations: back (bilateral)  Duration: 3 days  Signs / symptoms: itching (she is concerned re: shingles)  Severity: mild  Timing: intermittent (worse at night)  Aggravated by: nothing  Relieving factors/Treatments tried: nothing    Rash  - Follow-up  Diagnosis: candidal intertrigo.  Symptom course: improving  Currently using: took diflucan, used ketoconazole cream.  Affected locations: currently clear  Signs / symptoms: asymptomatic    Spot  - Initial  Affected locations: right arm  Duration: years.  Signs / symptoms: redness  Severity: mild  Timing: constant  Aggravated by: nothing  Relieving factors/Treatments tried: nothing      Review of Systems   Constitutional: Negative for fatigue and malaise.   Skin: Positive for itching. Negative for rash.   Neurological:        No shooting or radiating pain        Objective:    Physical Exam   Constitutional: She appears well-developed and well-nourished. No distress.   HENT:   Mouth/Throat: Lips normal.    Eyes: Lids are normal.  No conjunctival no injection.   Lymphadenopathy: No supraclavicular adenopathy is present.     She has no cervical adenopathy.     She has no axillary adenopathy.     She has no inguinal adenopathy.   Neurological: She is alert and oriented to person, place, and time. She is not disoriented.   Psychiatric: She has a normal mood and affect.   Skin:   Areas Examined (abnormalities noted in diagram):   Head / Face Inspection Performed  Neck Inspection Performed  Genitals / Buttocks / Groin Inspection Performed  Back Inspection Performed  RUE Inspected  LUE Inspection Performed                       Diagram Legend     Erythematous scaling macule/papule c/w actinic keratosis       Vascular papule c/w angioma      Pigmented  verrucoid papule/plaque c/w seborrheic keratosis      Yellow umbilicated papule c/w sebaceous hyperplasia      Irregularly shaped tan macule c/w lentigo     1-2 mm smooth white papules consistent with Milia      Movable subcutaneous cyst with punctum c/w epidermal inclusion cyst      Subcutaneous movable cyst c/w pilar cyst      Firm pink to brown papule c/w dermatofibroma      Pedunculated fleshy papule(s) c/w skin tag(s)      Evenly pigmented macule c/w junctional nevus     Mildly variegated pigmented, slightly irregular-bordered macule c/w mildly atypical nevus      Flesh colored to evenly pigmented papule c/w intradermal nevus       Pink pearly papule/plaque c/w basal cell carcinoma      Erythematous hyperkeratotic cursted plaque c/w SCC      Surgical scar with no sign of skin cancer recurrence      Open and closed comedones      Inflammatory papules and pustules      Verrucoid papule consistent consistent with wart     Erythematous eczematous patches and plaques     Dystrophic onycholytic nail with subungual debris c/w onychomycosis     Umbilicated papule    Erythematous-base heme-crusted tan verrucoid plaque consistent with inflamed seborrheic keratosis     Erythematous Silvery Scaling Plaque c/w Psoriasis     See annotation      Assessment / Plan:        Pruritus  No signs of herpes zoster at this time. Bilaterality of the pruritus make VZV less likely as well.  Recommended CeraVe Itch Relief cream/lotion or Sarna sensitive lotion. Can store these in the refrigerator for an added cooling effect.    Candidal intertrigo  Improved.    Hemangioma of skin  This is a benign vascular lesion. Reassurance given. No treatment required.  Discussed with the patient that treatment of these benign lesions is not covered by insurance as it is considered cosmetic. Warned about risk of scarring and hypo- or hyperpigmentation with treatment, as well as risk of recurrence and/or development of more lesions.  Recommended and  discussed risks, benefits, alternatives, and side effects of pulse dye laser (PDL) vs hyfrecation.      Follow up in about 3 months (around 1/21/2020) for TBSE ( hx of MMis).

## 2019-10-23 ENCOUNTER — CLINICAL SUPPORT (OUTPATIENT)
Dept: REHABILITATION | Facility: OTHER | Age: 64
End: 2019-10-23
Payer: MEDICARE

## 2019-10-23 DIAGNOSIS — R29.898 SHOULDER WEAKNESS: ICD-10-CM

## 2019-10-23 DIAGNOSIS — M54.2 NECK PAIN: ICD-10-CM

## 2019-10-23 DIAGNOSIS — M25.611 DECREASED RANGE OF MOTION OF RIGHT SHOULDER: ICD-10-CM

## 2019-10-23 DIAGNOSIS — M25.511 ACUTE PAIN OF RIGHT SHOULDER: ICD-10-CM

## 2019-10-23 PROCEDURE — 97110 THERAPEUTIC EXERCISES: CPT | Mod: PN

## 2019-10-23 PROCEDURE — 97140 MANUAL THERAPY 1/> REGIONS: CPT | Mod: PN

## 2019-10-23 NOTE — PATIENT INSTRUCTIONS
PRONE W        Lying face down with your elbows bent and palms facing downward, slowly raise your arms up towards the ceiling as you squeeze your shoulder blades downward and towards your spine.     Hold for  3  Seconds. Perform 2 sets of  10  reps    Copyright © 5517-1756 [employer]      PRONE Y        Lying face down with your arms stretched out upwards as shown, slowly move your arms upward towards the ceiling as you squeeze your shoulder blades downward and towards your spine.     Hold for  3  Seconds. Perform 2 sets of  10  reps    Copyright © 2544-1584 [employer]    PRONE RETRACTION EXTENSION - PRONE I          Lying face down with your arms by your side, slowly move your arms upward towards the ceiling as you squeeze your shoulder blades downwards and towards your spine.     Hold for  3  Seconds. Perform 2 sets of  10  reps    Copyright © 9051-2808 [employer]    Prone Ts        Start Position: arms out to your sides (like an airplane wing) Palms facing down.    End position: squeeze your shoulder blades together and raise your arms up     Hold for  3  Seconds. Perform 2 sets of  10  Reps.    Copyright © 2488-5854 [employer]

## 2019-10-23 NOTE — PROGRESS NOTES
"                            Physical Therapy Daily Treatment Note     Name: Sara David  Clinic Number: [id number]    Therapy Diagnosis:   Encounter Diagnoses   Name Primary?    Acute pain of right shoulder     Shoulder weakness     Decreased range of motion of right shoulder     Neck pain      Physician: Jatin Hair MD    Visit Date: 10/23/2019  Physician Orders: PT Eval and Treat   Medical Diagnosis: M25.511 (ICD-10-CM) - Right shoulder pain, unspecified chronicity  Evaluation Date: 10/3/2019  Authorization Period Expiration: 10/17/19  Plan of Care Certification Period: 11/28/19  Visit # / Visits authorized: 2/ 6    Time In: 2:02 PM  Time Out: 3:00 PM  Total Billable Time: 58 minutes    Precautions: Standard    Subjective   Pt reports: she did feel much of a stretch with the SCM stretch. States she was on vacation, then sick, so she just got back to water aerobics the past two days. Reported tenderness to palpation in R upper traps and R levator scapula. Stated she had dry needling in the past and did not like it.    She was compliant with home exercise program.  Response to previous treatment: no adverse effects  Functional change: no change reported    Pain: 1/10  Location: shoulder  right    Objective     Elise received therapeutic exercises to develop strength, ROM, flexibility and posture for 43 minutes including:  pec stretch on towel roll x 4'  Supine shoulder flex 20 x 5" in pain free range  Scapular protraction 20 x 5" in pain free range  Supine hor abd/add x 2 x 10 reps  Side lying shoulder ER x 20 reps  Side lying shoulder abd to 90 deg x 20 reps  Side lying flex x 20 reps  Prone Ws 2 x 10 reps  Prone Ts 2 x 10 reps  Prone Is 2 x 10 reps   Prone single arm scaption 2 x 10 reps    Elise received the following manual therapy techniques: Myofacial release and Soft tissue Mobilization were applied to the: neck R upper back for 15 minutes, including:  Upper trap release.   Trigger point " release R upper trap  Trigger point release R levator scapula  stm R cervical perispinals      Home Exercises Provided and Patient Education Provided     Education provided:   Prone Ts  Prone Ws  Prone Is  Prone scaption    Written Home Exercises Provided: yes.  Exercises were reviewed and Elise was able to demonstrate them prior to the end of the session.  Elise demonstrated good  understanding of the education provided.     See EMR under Patient Instructions for exercises provided 10/23/2019.    Assessment     Patient initially experience posterior shoulder/scapular pain with supine flexion and protraction. Continued with prone scapular stabilization today. Following manual therapy, pt experienced decreased R shoulder pain with flexion in sitting.    Elise is progressing well towards her goals.   Pt prognosis is Good.     Pt will continue to benefit from skilled outpatient physical therapy to address the deficits listed in the problem list box on initial evaluation, provide pt/family education and to maximize pt's level of independence in the home and community environment.     Pt's spiritual, cultural and educational needs considered and pt agreeable to plan of care and goals.    Anticipated barriers to physical therapy: none    Goals:   Short Term Goals: 4 weeks   1. Independent with HEP. (ongoing)  2. Report decreased R UE/neck < or = 3 /10 with adls such as reaching up, reaching behind. (in progress)  3. Increased MMT for B UE by 1 muscle grade to promote proper scapular stability to decrease R UE/neck < or = 3 /10 with adls such as reaching up, reaching behind.  4. Increased AROM for R shoulder flexion to 170 deg. (in progress)        Long Term Goals: 8 weeks   5. Report decreased R UE/neck < or =  1/10 with adls such asreaching up, reaching behind. (in progress)  6. Increased MMT for B UE to 5/5 muscle grade to promote proper scapular stability to decrease R UE/neck < or =  1/10 with adls such asreaching up,  reaching behind. (in progress)     7. Patient to achieve CJ( at least 20% < 40% impaired, limited or restricted) level on the FOTO Outcomes Measurement System. (in progress)       Plan     Continue with scapular stabilization and manual therapy.    Buzz Gaxiola, PT

## 2019-10-24 ENCOUNTER — OFFICE VISIT (OUTPATIENT)
Dept: SPORTS MEDICINE | Facility: CLINIC | Age: 64
End: 2019-10-24
Payer: MEDICARE

## 2019-10-24 ENCOUNTER — PATIENT OUTREACH (OUTPATIENT)
Dept: ADMINISTRATIVE | Facility: OTHER | Age: 64
End: 2019-10-24

## 2019-10-24 VITALS
BODY MASS INDEX: 28.98 KG/M2 | DIASTOLIC BLOOD PRESSURE: 72 MMHG | HEART RATE: 78 BPM | WEIGHT: 207 LBS | SYSTOLIC BLOOD PRESSURE: 123 MMHG | HEIGHT: 71 IN

## 2019-10-24 DIAGNOSIS — M25.519: Primary | ICD-10-CM

## 2019-10-24 PROCEDURE — 1101F PR PT FALLS ASSESS DOC 0-1 FALLS W/OUT INJ PAST YR: ICD-10-PCS | Mod: CPTII,S$GLB,, | Performed by: ORTHOPAEDIC SURGERY

## 2019-10-24 PROCEDURE — 99214 OFFICE O/P EST MOD 30 MIN: CPT | Mod: S$GLB,,, | Performed by: ORTHOPAEDIC SURGERY

## 2019-10-24 PROCEDURE — 99999 PR PBB SHADOW E&M-EST. PATIENT-LVL III: ICD-10-PCS | Mod: PBBFAC,,, | Performed by: ORTHOPAEDIC SURGERY

## 2019-10-24 PROCEDURE — 99214 PR OFFICE/OUTPT VISIT, EST, LEVL IV, 30-39 MIN: ICD-10-PCS | Mod: S$GLB,,, | Performed by: ORTHOPAEDIC SURGERY

## 2019-10-24 PROCEDURE — 1101F PT FALLS ASSESS-DOCD LE1/YR: CPT | Mod: CPTII,S$GLB,, | Performed by: ORTHOPAEDIC SURGERY

## 2019-10-24 PROCEDURE — 99999 PR PBB SHADOW E&M-EST. PATIENT-LVL III: CPT | Mod: PBBFAC,,, | Performed by: ORTHOPAEDIC SURGERY

## 2019-10-24 NOTE — PROGRESS NOTES
CC: RIGHT shoulder pain     64 y.o. Female who returns for follow up evaluation.  She continues to have SC joint pain and clavicle pain.    History: R shoulder pain since 2/2019 when she developed R SCJ prominence atraumatically.  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  Has pain at SCJ and periscapular.  It is worsening.      She reports that the pain and weakness is worse with overhead activity. It also bothers her at night.    Is affecting ADLs.        Past Medical History:   Diagnosis Date    Colon polyp 2008    Endometriosis     History of melanoma in situ 4/17/2018    Left arm, s/p excision by Alexandre Gomez MD    Hypothyroidism     dx in her 40s, enlarged thyroid, did have FNA of thyroid nodule which was benign    Melanoma 1990, 2018 1990s - on back; 2018 - L upper arm    Miscarriage        Past Surgical History:   Procedure Laterality Date    BACK SURGERY      DILATION AND CURETTAGE OF UTERUS      History of polyps    FNA thyroid      in her 40s    MALIGNANT SKIN LESION EXCISION      1990s, 2018    SALPINGOOPHORECTOMY      WISDOM TOOTH EXTRACTION         Family History   Problem Relation Age of Onset    Hypertension Father     Breast cancer Sister     Cancer Sister     Bone cancer Sister     Parkinsonism Sister     Multiple sclerosis Sister     Lymphoma Maternal Aunt     Arthritis Mother     Chronic back pain Brother     Colon cancer Neg Hx     Diabetes Neg Hx     Miscarriages / Stillbirths Neg Hx     Stroke Neg Hx     Melanoma Neg Hx          Current Outpatient Medications:     cetirizine (ZYRTEC) 10 MG tablet, Take 10 mg by mouth once daily., Disp: , Rfl:     estradiol 0.05 mg/24 hr td ptsw (VIVELLE-DOT) 0.05 mg/24 hr, Place 1 patch onto the skin twice a week., Disp: 24 patch, Rfl: 3    ketoconazole (NIZORAL) 2 % cream, Apply to affected areas of groin BID prn rash., Disp: 15 g, Rfl: 1    levothyroxine (SYNTHROID) 88 MCG  "tablet, before breakfast. , Disp: , Rfl:     traZODone (DESYREL) 50 MG tablet, nightly as needed. , Disp: , Rfl:     triamcinolone acetonide 0.025% (KENALOG) 0.025 % cream, Apply to affected areas of groin BID prn rash., Disp: 15 g, Rfl: 1    progesterone (PROMETRIUM) 200 MG capsule, Take 1 capsule (200 mg total) by mouth every evening. for 10 days, Disp: 30 capsule, Rfl: 11    Review of patient's allergies indicates:   Allergen Reactions    Iodinated contrast media           REVIEW OF SYSTEMS:  Constitution: Negative. Negative for chills, fever and night sweats.   HENT: Negative for congestion and headaches.    Eyes: Negative for blurred vision, left vision loss and right vision loss.   Cardiovascular: Negative for chest pain and syncope.   Respiratory: Negative for cough and shortness of breath.    Endocrine: Negative for polydipsia, polyphagia and polyuria.   Hematologic/Lymphatic: Negative for bleeding problem. Does not bruise/bleed easily.   Skin: Negative for dry skin, itching and rash.   Musculoskeletal: Negative for falls.  Positive for right shoulder pain and muscle weakness.   Gastrointestinal: Negative for abdominal pain and bowel incontinence.   Genitourinary: Negative for bladder incontinence and nocturia.   Neurological: Negative for disturbances in coordination, loss of balance and seizures.   Psychiatric/Behavioral: Negative for depression. The patient does not have insomnia.    Allergic/Immunologic: Negative for hives and persistent infections.      PHYSICAL EXAMINATION:  Vitals:  /72   Pulse 78   Ht 5' 11" (1.803 m)   Wt 93.9 kg (207 lb)   LMP  (LMP Unknown)   BMI 28.87 kg/m²    General: The patient is alert and oriented x 3.  Mood is pleasant.  Observation of ears, eyes and nose reveal no gross abnormalities.  No labored breathing observed.  Gait is coordinated. Patient can toe walk and heel walk without difficulty.      RIGHT SHOULDER / UPPER EXTREMITY EXAM    OBSERVATION:  "    Swelling  none  Deformity  none   Discoloration  none   Scapular winging none   Scars   none  Atrophy  none    TENDERNESS / CREPITUS (T/C):          T/C      T/C   Clavicle   +/-  SUPRAspinatus    +/-     AC Jt.    +/-  INFRAspinatus  +/-    SC Jt.    +/-  Deltoid    -/-      G. Tuberosity  -/-  LH BICEP groove  -/-   Acromion:  -/-  Midline Neck   -/-     Scapular Spine -/-  Trapezium   +/-   SMA Scapula  -/-  GH jt. line - post  -/-     Scapulothoracic  -/-         ROM: (* = with pain)  Left shoulder   Right shoulder        AROM (PROM)   AROM (PROM)   FE    170° (175°)     170° (175°)     ER at 0°    60°  (65°)    60°  (65°)   ER at 90° ABD  90°  (90°)    90°  (90°)   IR at 90°  ABD   NA  (40°)     NA  (40°)      IR (spine level)   T10     T10    STRENGTH: (* = with pain) Left shoulder   Right shoulder   SCAPTION   5/5    5/5    IR    5/5    5/5   ER    5/5    5/5   BICEPS   5/5    5/5   Deltoid    5/5    5/5     SIGNS:  Painful side       NEER   -    OKELSIES  neg    MCDONALD   -    SPEEDS  neg     DROP ARM   -   BELLY PRESS neg   Superior escape none    LIFT-OFF  neg   X-Body ADD    neg    MOVING VALGUS neg        STABILITY TESTING    Left shoulder   Right shoulder    Translation     Anterior  up face     up face    Posterior  up face    up face    Sulcus   < 10mm    < 10 mm     Signs   Apprehension   neg      neg       Relocation   no change     no change      Jerk test  neg     neg    EXTREMITY NEURO-VASCULAR EXAM:    Sensation grossly intact to light touch all dermatomal regions.    DTR 2+ Biceps, Triceps, BR and Negative Ethans sign   Grossly intact motor function at Elbow, Wrist and Hand   Distal pulses radial and ulnar 2+, brisk cap refill, symmetric.      NECK:  Painless FROM and spinous processes non-tender. Negative Spurlings sign.      OTHER FINDINGS:  Moderate scapular dyskinesia    XRAYS:  Xrays including AP, Outlet and Axillary Lateral of shoulder are ordered / images reviewed by  me:   No fracture dislocation or other pathology   Acromion type 2   Proximal migration of humeral head: None   GH arthritis: None        ASSESSMENT:   R SCJ Dislocation    PLAN:    1. Continue physical therapy  2. Follow up as needed

## 2019-10-30 ENCOUNTER — CLINICAL SUPPORT (OUTPATIENT)
Dept: REHABILITATION | Facility: OTHER | Age: 64
End: 2019-10-30
Payer: MEDICARE

## 2019-10-30 DIAGNOSIS — M25.611 DECREASED RANGE OF MOTION OF RIGHT SHOULDER: ICD-10-CM

## 2019-10-30 DIAGNOSIS — R29.898 SHOULDER WEAKNESS: ICD-10-CM

## 2019-10-30 DIAGNOSIS — M54.2 NECK PAIN: ICD-10-CM

## 2019-10-30 DIAGNOSIS — M25.511 ACUTE PAIN OF RIGHT SHOULDER: ICD-10-CM

## 2019-10-30 PROCEDURE — 97110 THERAPEUTIC EXERCISES: CPT | Mod: PN

## 2019-10-30 PROCEDURE — 97140 MANUAL THERAPY 1/> REGIONS: CPT | Mod: PN

## 2019-10-30 NOTE — PROGRESS NOTES
"                            Physical Therapy Daily Treatment Note     Name: Sara David  Clinic Number: [id number]    Therapy Diagnosis:   Encounter Diagnoses   Name Primary?    Shoulder weakness     Decreased range of motion of right shoulder     Neck pain     Acute pain of right shoulder      Physician: Jatin Hair MD    Visit Date: 10/30/2019  Physician Orders: PT Eval and Treat   Medical Diagnosis: M25.511 (ICD-10-CM) - Right shoulder pain, unspecified chronicity  Evaluation Date: 10/3/2019  Authorization Period Expiration: 10/17/19  Plan of Care Certification Period: 11/28/19  Visit # / Visits authorized: 3/ 6    Time In: 3:00 PM  Time Out: 3:58 PM  Total Billable Time: 58 minutes    Precautions: Standard    Subjective   Pt reports: she did feel much of a stretch with the SCM stretch. States she was on vacation, then sick, so she just got back to water aerobics the past two days. Reported tenderness to palpation in R upper traps and R levator scapula. Stated she had dry needling in the past and did not like it.    She was compliant with home exercise program.  Response to previous treatment: no adverse effects  Functional change: no change reported    Pain: 0.5/10  Location: shoulder  right    Objective     Elise received therapeutic exercises to develop strength, ROM, flexibility and posture for 43 minutes including:  pec stretch on towel roll x 4'  Supine shoulder flex 20 x 5" in pain free range  Scapular protraction 20 x 5" in pain free range  Supine hor abd/add x 2 x 10 reps  Side lying shoulder ER x 20 reps  Side lying shoulder abd to 90 deg x 20 reps  Side lying flex x 20 reps  Bent over scaption 2 x 10 reps  Prone Ts 2 x 10 reps  Prone Is 2 x 10 reps   Prone Ws 2 x 10 reps    Elise received the following manual therapy techniques: Myofacial release and Soft tissue Mobilization were applied to the: neck R upper back for 15 minutes, including:  Upper trap release.   Trigger point " release R upper trap  Trigger point release R levator scapula  stm R cervical perispinals      Home Exercises Provided and Patient Education Provided     Education provided:   pec stretch on towel roll  Side lying hor abd  Side lying flex  Side lying ER  Side lying flex    Written Home Exercises Provided: yes and previous HEP.  Exercises were reviewed and Elise was able to demonstrate them prior to the end of the session.  Elise demonstrated good  understanding of the education provided.     See EMR under Patient Instructions for exercises provided 10/23/2019 and 10/30/2019.    Assessment     Patient will be out of town for 2 weeks. PT added to HEP for patient to continue until she returns to town. Patient expressed difficulty with prone exercises. Instructed in bent over technique, but pt preferred prone to be able to do B UEs simultaneously.    Elise is progressing well towards her goals.   Pt prognosis is Good.     Pt will continue to benefit from skilled outpatient physical therapy to address the deficits listed in the problem list box on initial evaluation, provide pt/family education and to maximize pt's level of independence in the home and community environment.     Pt's spiritual, cultural and educational needs considered and pt agreeable to plan of care and goals.    Anticipated barriers to physical therapy: none    Goals:   Short Term Goals: 4 weeks   1. Independent with HEP. (ongoing)  2. Report decreased R UE/neck < or = 3 /10 with adls such as reaching up, reaching behind. (in progress)  3. Increased MMT for B UE by 1 muscle grade to promote proper scapular stability to decrease R UE/neck < or = 3 /10 with adls such as reaching up, reaching behind.  4. Increased AROM for R shoulder flexion to 170 deg. (in progress)        Long Term Goals: 8 weeks   5. Report decreased R UE/neck < or =  1/10 with adls such asreaching up, reaching behind. (in progress)  6. Increased MMT for B UE to 5/5 muscle grade to  promote proper scapular stability to decrease R UE/neck < or =  1/10 with adls such asreaching up, reaching behind. (in progress)     7. Patient to achieve CJ( at least 20% < 40% impaired, limited or restricted) level on the FOTO Outcomes Measurement System. (in progress)       Plan     Continue with scapular stabilization and manual therapy.    Buzz Gaxiola, PT

## 2019-10-30 NOTE — PATIENT INSTRUCTIONS
FOAM ROLLER PEC MINOR STRETCH        Lie down on a foam roll and hold one of your wrists as it rests on your stomach.     Next, squeeze your shoulder blades together as you lower your shoulders toward the floor as shown.    Hold for a gentle stretch across your chest.    Copyright © 2834-7742 [employer]    External Rotation Sidelying          - Lie on uninvolved side with the involved shoulder lying on folded towel at side  - Keep the elbow bent and arm against towel throughout this exercise.  - Lift your wrist and forearm away from your abdomen.  Slowly lower  - Perform this motion only to a point that is pain-free.    Perform 2 sets of 10 reps.    Copyright © 0471-6012 [employer]    Shoulder Abduction 90 Degrees        While lying on your side and arm at your side, slowly raise up the arm towards overhead and away from your side stopping at 90 degrees.    Perform 2 sets of 10 reps    Copyright © 7487-6916 [employer]    Side Lying Shoulder Flexion        On your side, start with hand at your hip. Follow the horizon with your hand up past your head, then return.    Perform 2 sets of 10 reps    Copyright © 7761-0050 [employer]    AROM SIDELYING HORIZONTAL ABDUCTION        While lying on your side and arm out in front of your body, slowly raise up the arm towards overhead towards the ceiling as shown.     Perform 2 sets of 10 reps    Copyright © 1957-5163 [employer]

## 2019-11-20 ENCOUNTER — CLINICAL SUPPORT (OUTPATIENT)
Dept: REHABILITATION | Facility: OTHER | Age: 64
End: 2019-11-20
Payer: MEDICARE

## 2019-11-20 DIAGNOSIS — M25.611 DECREASED RANGE OF MOTION OF RIGHT SHOULDER: ICD-10-CM

## 2019-11-20 DIAGNOSIS — M54.2 NECK PAIN: ICD-10-CM

## 2019-11-20 DIAGNOSIS — M25.511 ACUTE PAIN OF RIGHT SHOULDER: ICD-10-CM

## 2019-11-20 DIAGNOSIS — R29.898 SHOULDER WEAKNESS: ICD-10-CM

## 2019-11-20 PROCEDURE — 97110 THERAPEUTIC EXERCISES: CPT | Mod: PN

## 2019-11-20 PROCEDURE — 97140 MANUAL THERAPY 1/> REGIONS: CPT | Mod: PN

## 2019-11-20 NOTE — PROGRESS NOTES
"                            Physical Therapy Daily Treatment Note     Name: Sara David  Clinic Number: [id number]    Therapy Diagnosis:   Encounter Diagnoses   Name Primary?    Acute pain of right shoulder     Shoulder weakness     Decreased range of motion of right shoulder     Neck pain      Physician: Jatin Hair MD    Visit Date: 11/20/2019  Physician Orders: PT Eval and Treat   Medical Diagnosis: M25.511 (ICD-10-CM) - Right shoulder pain, unspecified chronicity  Evaluation Date: 10/3/2019  Authorization Period Expiration: 10/17/19  Plan of Care Certification Period: 11/28/19  Visit # / Visits authorized: 4/ 6    Time In: 3:00 PM  Time Out: 3:44 PM  Total Billable Time: 44 minutes    Precautions: Standard    Subjective   Pt reports: she has been moving furniture and she has been sore. States her R shoulder pain yesterday was 3 out of 10. Reports she still has pain when she reaches up.    She was not fully compliant with home exercise program.  Response to previous treatment: no adverse effects  Functional change: improved posture    Pain: 0.5/10  Location: shoulder  right    Objective     Elise received therapeutic exercises to develop strength, ROM, flexibility and posture for 29 minutes including:  pec stretch on towel roll x 4'  Supine shoulder flex 20 x 5" in pain free range  Scapular protraction 20 x 5" in pain free range  Supine hor abd/add x 2 x 10 reps  Side lying shoulder ER x 20 reps  Side lying shoulder abd to 90 deg x 20 reps  Side lying flex x 20 reps  Bent over scaption 2 x 10 reps  Prone Ts 2 x 10 reps  Prone Is 2 x 10 reps   Prone Ws 2 x 10 reps    Elise received the following manual therapy techniques: Myofacial release and Soft tissue Mobilization were applied to the: neck R upper back for 15 minutes, including:  Upper trap release.   Trigger point release R upper trap  Trigger point release R levator scapula  stm R cervical perispinals  Grades I and II MONSE borges " C4      Home Exercises Provided and Patient Education Provided     Education provided:   No new HEP    Written Home Exercises Provided: Patient instructed to continue previous HEP.  Exercises were reviewed and Elise was able to demonstrate them prior to the end of the session.  Elise demonstrated good  understanding of the education provided.     See EMR under Patient Instructions for exercises provided 10/23/2019 and 11/20/2019.    Assessment     Will add resistance to exercises next visit and progress to standing exercises. Hypomobility and pain reported in C4 PA jony Olivares is progressing well towards her goals.   Pt prognosis is Good.     Pt will continue to benefit from skilled outpatient physical therapy to address the deficits listed in the problem list box on initial evaluation, provide pt/family education and to maximize pt's level of independence in the home and community environment.     Pt's spiritual, cultural and educational needs considered and pt agreeable to plan of care and goals.    Anticipated barriers to physical therapy: none    Goals:   Short Term Goals: 4 weeks   1. Independent with HEP. (ongoing)  2. Report decreased R UE/neck < or = 3 /10 with adls such as reaching up, reaching behind. (in progress)  3. Increased MMT for B UE by 1 muscle grade to promote proper scapular stability to decrease R UE/neck < or = 3 /10 with adls such as reaching up, reaching behind.  4. Increased AROM for R shoulder flexion to 170 deg. (in progress)        Long Term Goals: 8 weeks   5. Report decreased R UE/neck < or =  1/10 with adls such asreaching up, reaching behind. (in progress)  6. Increased MMT for B UE to 5/5 muscle grade to promote proper scapular stability to decrease R UE/neck < or =  1/10 with adls such asreaching up, reaching behind. (in progress)     7. Patient to achieve CJ( at least 20% < 40% impaired, limited or restricted) level on the FOTO Outcomes Measurement System. (in progress)        Plan     Continue with scapular stabilization and manual therapy.    Buzz Gaxiola, PT

## 2019-12-04 ENCOUNTER — CLINICAL SUPPORT (OUTPATIENT)
Dept: REHABILITATION | Facility: OTHER | Age: 64
End: 2019-12-04
Payer: MEDICARE

## 2019-12-04 DIAGNOSIS — M25.511 ACUTE PAIN OF RIGHT SHOULDER: ICD-10-CM

## 2019-12-04 DIAGNOSIS — M54.2 NECK PAIN: ICD-10-CM

## 2019-12-04 DIAGNOSIS — R29.898 SHOULDER WEAKNESS: ICD-10-CM

## 2019-12-04 DIAGNOSIS — M25.611 DECREASED RANGE OF MOTION OF RIGHT SHOULDER: ICD-10-CM

## 2019-12-04 PROCEDURE — 97110 THERAPEUTIC EXERCISES: CPT | Mod: PN | Performed by: PHYSICAL THERAPIST

## 2019-12-04 NOTE — PROGRESS NOTES
"                            Physical Therapy Daily Treatment Note     Name: Sara David  Clinic Number: [id number]    Therapy Diagnosis:   Encounter Diagnoses   Name Primary?    Acute pain of right shoulder     Shoulder weakness     Decreased range of motion of right shoulder     Neck pain      Physician: Jatin Hair MD    Visit Date: 12/4/2019  Physician Orders: PT Eval and Treat   Medical Diagnosis: M25.511 (ICD-10-CM) - Right shoulder pain, unspecified chronicity  Evaluation Date: 10/3/2019  Authorization Period Expiration: 10/17/19  Plan of Care Certification Period: 11/28/19  Visit # / Visits authorized: 5/ 6    Time In: 3:00 PM  Time Out: 3:50 PM  Total Billable Time: 50 minutes    Precautions: Standard    Subjective   Pt reports: "I'm feeling good today." She says she has a little soreness to back from water aerobics, but otherwise feeling good.     She was not fully compliant with home exercise program.  Response to previous treatment: no adverse effects  Functional change: improved posture    Pain: 0.5/10  Location: shoulder  right    Objective     Elise received therapeutic exercises to develop strength, ROM, flexibility and posture for 35 minutes including:  pec stretch on towel roll x 4'  Supine shoulder flex 20 x 5" in pain free range with 2#  Scapular protraction 20 x 5" in pain free range with 2#  Supine hor abd/add x 2 x 10 reps with OTB  Side lying shoulder ER x 20 reps with 2#  Side lying shoulder abd to 90 deg x 20 reps with 1#  Side lying flex x 20 reps with 1#  Bent over scaption 2 x 10 reps - NP  Prone Ts 2 x 10 reps with 1#  Prone Is 2 x 10 reps  With 1#  Prone Ws 2 x 10 reps with 1#    +Scap retraction 20x OTB  +Rows 20x OTB    Elise received the following manual therapy techniques: Myofacial release and Soft tissue Mobilization were applied to the: neck R upper back for 15 minutes, including:  Upper trap release.   Trigger point release R upper trap  Trigger point release " R levator scapula  stm R cervical perispinals  Grades I and II PA jony C4      Home Exercises Provided and Patient Education Provided     Education provided:   No new HEP    Written Home Exercises Provided: Patient instructed to continue previous HEP.  Exercises were reviewed and Elise was able to demonstrate them prior to the end of the session.  Elise demonstrated good  understanding of the education provided.     See EMR under Patient Instructions for exercises provided 10/23/2019 and 12/4/2019.    Assessment     Good tolerance to all treatment today. Resistance added to mat exercises, and standing therex added with good tolerance. Pt continues with significant trigger point tenderness and tightness to R upper trap and levator scap.     Elise is progressing well towards her goals.   Pt prognosis is Good.     Pt will continue to benefit from skilled outpatient physical therapy to address the deficits listed in the problem list box on initial evaluation, provide pt/family education and to maximize pt's level of independence in the home and community environment.     Pt's spiritual, cultural and educational needs considered and pt agreeable to plan of care and goals.    Anticipated barriers to physical therapy: none    Goals:   Short Term Goals: 4 weeks   1. Independent with HEP. (ongoing)  2. Report decreased R UE/neck < or = 3 /10 with adls such as reaching up, reaching behind. (in progress)  3. Increased MMT for B UE by 1 muscle grade to promote proper scapular stability to decrease R UE/neck < or = 3 /10 with adls such as reaching up, reaching behind.  4. Increased AROM for R shoulder flexion to 170 deg. (in progress)        Long Term Goals: 8 weeks   5. Report decreased R UE/neck < or =  1/10 with adls such asreaching up, reaching behind. (in progress)  6. Increased MMT for B UE to 5/5 muscle grade to promote proper scapular stability to decrease R UE/neck < or =  1/10 with adls such asreaching up, reaching  behind. (in progress)     7. Patient to achieve CJ( at least 20% < 40% impaired, limited or restricted) level on the FOTO Outcomes Measurement System. (in progress)       Plan     Continue with scapular stabilization and manual therapy.    Kristie Oliva, PT

## 2019-12-08 DIAGNOSIS — N95.1 HOT FLUSHES, PERIMENOPAUSAL: ICD-10-CM

## 2019-12-08 RX ORDER — PROGESTERONE 200 MG/1
CAPSULE ORAL
Qty: 90 CAPSULE | Refills: 3 | Status: SHIPPED | OUTPATIENT
Start: 2019-12-08 | End: 2021-06-07

## 2020-01-11 ENCOUNTER — PATIENT MESSAGE (OUTPATIENT)
Dept: OBSTETRICS AND GYNECOLOGY | Facility: CLINIC | Age: 65
End: 2020-01-11

## 2020-01-29 ENCOUNTER — PATIENT OUTREACH (OUTPATIENT)
Dept: ADMINISTRATIVE | Facility: OTHER | Age: 65
End: 2020-01-29

## 2020-01-29 NOTE — PROGRESS NOTES
Care Everywhere: updated  Immunization: updated  Health Maintenance: updated  Media Review: reviewed for possible outside colonoscopy report  Legacy Review: n/a  Order placed: n/a  Upcoming appts:n/a

## 2020-01-30 ENCOUNTER — OFFICE VISIT (OUTPATIENT)
Dept: OBSTETRICS AND GYNECOLOGY | Facility: CLINIC | Age: 65
End: 2020-01-30
Payer: MEDICARE

## 2020-01-30 VITALS
BODY MASS INDEX: 29.04 KG/M2 | SYSTOLIC BLOOD PRESSURE: 118 MMHG | DIASTOLIC BLOOD PRESSURE: 72 MMHG | HEIGHT: 71 IN | WEIGHT: 207.44 LBS

## 2020-01-30 DIAGNOSIS — N95.0 POSTMENOPAUSAL BLEEDING: ICD-10-CM

## 2020-01-30 DIAGNOSIS — Z12.39 SCREENING FOR MALIGNANT NEOPLASM OF BREAST: ICD-10-CM

## 2020-01-30 DIAGNOSIS — L29.2 VULVAR ITCHING: ICD-10-CM

## 2020-01-30 DIAGNOSIS — N95.1 MENOPAUSAL SYMPTOMS: ICD-10-CM

## 2020-01-30 DIAGNOSIS — Z01.419 WELL WOMAN EXAM WITH ROUTINE GYNECOLOGICAL EXAM: Primary | ICD-10-CM

## 2020-01-30 DIAGNOSIS — Z12.31 ENCOUNTER FOR SCREENING MAMMOGRAM FOR MALIGNANT NEOPLASM OF BREAST: ICD-10-CM

## 2020-01-30 DIAGNOSIS — R30.0 DYSURIA: ICD-10-CM

## 2020-01-30 LAB
BACTERIAL VAGINOSIS DNA: NEGATIVE
CANDIDA GLABRATA DNA: NEGATIVE
CANDIDA KRUSEI DNA: NEGATIVE
CANDIDA RRNA VAG QL PROBE: NEGATIVE
T VAGINALIS RRNA GENITAL QL PROBE: NEGATIVE

## 2020-01-30 PROCEDURE — G0101 CA SCREEN;PELVIC/BREAST EXAM: HCPCS | Mod: S$GLB,,, | Performed by: OBSTETRICS & GYNECOLOGY

## 2020-01-30 PROCEDURE — 87481 CANDIDA DNA AMP PROBE: CPT | Mod: 59

## 2020-01-30 PROCEDURE — 99999 PR PBB SHADOW E&M-EST. PATIENT-LVL III: ICD-10-PCS | Mod: PBBFAC,,, | Performed by: OBSTETRICS & GYNECOLOGY

## 2020-01-30 PROCEDURE — 87086 URINE CULTURE/COLONY COUNT: CPT

## 2020-01-30 PROCEDURE — 99999 PR PBB SHADOW E&M-EST. PATIENT-LVL III: CPT | Mod: PBBFAC,,, | Performed by: OBSTETRICS & GYNECOLOGY

## 2020-01-30 PROCEDURE — G0101 PR CA SCREEN;PELVIC/BREAST EXAM: ICD-10-PCS | Mod: S$GLB,,, | Performed by: OBSTETRICS & GYNECOLOGY

## 2020-01-30 RX ORDER — ESTRADIOL 0.04 MG/D
1 FILM, EXTENDED RELEASE TRANSDERMAL
Qty: 8 PATCH | Refills: 11 | Status: SHIPPED | OUTPATIENT
Start: 2020-01-30 | End: 2021-06-07

## 2020-01-30 NOTE — PROGRESS NOTES
Subjective:       Patient ID: Sara David is a 64 y.o. female.    Chief Complaint:  Annual Exam (pt states for the pass 3mth has been having a cycle with back pain; pt did state she has had a UTI)    History of Present Illness:  HPI  SUBJECTIVE:   64 y.o. female  here for annual. Exercising more. Reports dysuria.    She had three episodes of light bleeding for 3 days over the past month. She had back pain c/w with dysmenorrhea.     She is on combined HRT for menopausal symptoms.     denies vaginal itching or irritation.  denies vaginal discharge.    She is sexually active.    History of abnormal pap: No  Last Pap: 3/20/2018 - NILM/HPV neg  Last MMG: 3/2019 - Birads 1  Last Colonoscopy:     FH: Sister breast cancer. Denies family history of GYN or colon cancer.    GYN & OB History  Patient's last menstrual period was 2020.   Date of Last Pap: 3/20/2018    OB History    Para Term  AB Living   2   0   2     SAB TAB Ectopic Multiple Live Births                  # Outcome Date GA Lbr Brice/2nd Weight Sex Delivery Anes PTL Lv   2 AB            1 AB                Review of Systems  Review of Systems   Constitutional: Negative for chills, diaphoresis, fatigue and fever.   Respiratory: Negative for cough and shortness of breath.    Cardiovascular: Negative for chest pain and palpitations.   Gastrointestinal: Negative for abdominal pain, constipation, diarrhea, nausea and vomiting.   Genitourinary: Positive for vaginal bleeding and postmenopausal bleeding. Negative for dyspareunia, dysuria, frequency, genital sores, hematuria, hot flashes, pelvic pain, vaginal discharge and vaginal pain.   Musculoskeletal: Negative for back pain and myalgias.   Integumentary:  Negative for rash, acne, breast mass, nipple discharge and breast skin changes.   Neurological: Negative for headaches.   Psychiatric/Behavioral: Negative for depression. The patient is not nervous/anxious.    Breast: Negative  for mass, mastodynia, nipple discharge and skin changes          Objective:    Physical Exam:   Constitutional: She is oriented to person, place, and time. She appears well-developed and well-nourished. No distress.    HENT:   Head: Normocephalic and atraumatic.    Eyes: EOM are normal.    Neck: Normal range of motion. No thyromegaly present.     Pulmonary/Chest: Effort normal. She exhibits no mass and no tenderness. Right breast exhibits no inverted nipple, no mass, no nipple discharge, no skin change, no tenderness and no swelling. Left breast exhibits no inverted nipple, no mass, no nipple discharge, no skin change, no tenderness and no swelling. Breasts are symmetrical.        Abdominal: Soft. She exhibits no distension and no mass. There is no tenderness. There is no rebound and no guarding. No hernia.     Genitourinary:   Genitourinary Comments: Atrophic external female genitalia with erythematous rash perineum and buttocks; vagina atrophic, normal; cervix normal, no masses; uterus small mobile nontender; no adnexal masses palpated; rectal deferred             Musculoskeletal: Normal range of motion and moves all extremeties.       Neurological: She is alert and oriented to person, place, and time.    Skin: Skin is warm. No rash noted.    Psychiatric: She has a normal mood and affect. Her behavior is normal. Judgment and thought content normal.          Assessment:        1. Well woman exam with routine gynecological exam    2. Dysuria    3. Postmenopausal bleeding    4. Menopausal symptoms    5. Screening for malignant neoplasm of breast    6. Encounter for screening mammogram for malignant neoplasm of breast     7. Vulvar itching             Plan:      Sara was seen today for annual exam.    Diagnoses and all orders for this visit:    Well woman exam with routine gynecological exam  - Pap guidelines discussed. Pap up to date.   - MMG ordered.   - Cscope per PCP  - DXA - up to date  - Contraception - NA  -  STD screening - declined.    Dysuria  -     Urine culture    Postmenopausal bleeding  -     US Pelvis Comp with Transvag NON-OB (xpd; Future  - Patient with recurrent PMB. She declines EMBx today. TVUS ordered. Recommended decreasing estradiol dose. Continue progestin.    Menopausal symptoms  -     estradiol (VIVELLE-DOT) 0.0375 mg/24 hr; Place 1 patch onto the skin twice a week.    Screening for malignant neoplasm of breast  -     Mammo Digital Screening Bilat w/ Bishop; Future    Encounter for screening mammogram for malignant neoplasm of breast   -     Mammo Digital Screening Bilat w/ Bishop; Future    Vulvar itching  -     Vaginosis Screen by DNA Probe  - Recommended aquaphor application to rash. If no improvement, consider trial of clobetasol.     Orders Placed This Encounter   Procedures    Urine culture    Vaginosis Screen by DNA Probe    US Pelvis Comp with Transvag NON-OB (xpd    Mammo Digital Screening Bilat w/ Bishop       Follow up in about 2 years (around 1/30/2022) for annual.

## 2020-01-31 ENCOUNTER — PATIENT MESSAGE (OUTPATIENT)
Dept: OBSTETRICS AND GYNECOLOGY | Facility: CLINIC | Age: 65
End: 2020-01-31

## 2020-01-31 LAB — BACTERIA UR CULT: NO GROWTH

## 2020-02-04 ENCOUNTER — TELEPHONE (OUTPATIENT)
Dept: INTERNAL MEDICINE | Facility: CLINIC | Age: 65
End: 2020-02-04

## 2020-02-04 NOTE — TELEPHONE ENCOUNTER
Spoke with pt, pt wanted to get an appt for back pain. Pt notice that she hasn't seen the doctor in a little over a year. She decided to make it her annual instead. Pt is schedule to see the doctor on 2/18

## 2020-02-04 NOTE — TELEPHONE ENCOUNTER
----- Message from Blanquita Marie sent at 2/4/2020 10:27 AM CST -----  Contact: [employer] request  Message     Appointment Request From: Sara David    With Provider: Dane Finley [facility]]    Preferred Date Range: 2/10/2020 - 2/11/2020    Preferred Times: Any time    Reason for visit: Back pain    Comments:  Renew my prescriptions

## 2020-02-18 ENCOUNTER — OFFICE VISIT (OUTPATIENT)
Dept: INTERNAL MEDICINE | Facility: CLINIC | Age: 65
End: 2020-02-18
Payer: MEDICARE

## 2020-02-18 VITALS
HEIGHT: 71 IN | BODY MASS INDEX: 28.37 KG/M2 | WEIGHT: 202.63 LBS | HEART RATE: 75 BPM | DIASTOLIC BLOOD PRESSURE: 68 MMHG | OXYGEN SATURATION: 97 % | TEMPERATURE: 98 F | SYSTOLIC BLOOD PRESSURE: 110 MMHG

## 2020-02-18 DIAGNOSIS — K64.9 HEMORRHOIDS, UNSPECIFIED HEMORRHOID TYPE: ICD-10-CM

## 2020-02-18 DIAGNOSIS — E03.9 ACQUIRED HYPOTHYROIDISM: ICD-10-CM

## 2020-02-18 DIAGNOSIS — M25.551 BILATERAL HIP PAIN: ICD-10-CM

## 2020-02-18 DIAGNOSIS — E01.0 THYROMEGALY: ICD-10-CM

## 2020-02-18 DIAGNOSIS — Z13.220 ENCOUNTER FOR LIPID SCREENING FOR CARDIOVASCULAR DISEASE: ICD-10-CM

## 2020-02-18 DIAGNOSIS — Z00.00 ANNUAL PHYSICAL EXAM: Primary | ICD-10-CM

## 2020-02-18 DIAGNOSIS — N95.1 HOT FLASHES DUE TO MENOPAUSE: ICD-10-CM

## 2020-02-18 DIAGNOSIS — Z85.820 HISTORY OF MELANOMA: ICD-10-CM

## 2020-02-18 DIAGNOSIS — M25.552 BILATERAL HIP PAIN: ICD-10-CM

## 2020-02-18 DIAGNOSIS — N18.30 STAGE 3 CHRONIC KIDNEY DISEASE: ICD-10-CM

## 2020-02-18 DIAGNOSIS — G47.9 SLEEP DIFFICULTIES: ICD-10-CM

## 2020-02-18 DIAGNOSIS — Z13.6 ENCOUNTER FOR LIPID SCREENING FOR CARDIOVASCULAR DISEASE: ICD-10-CM

## 2020-02-18 DIAGNOSIS — M54.50 BILATERAL LOW BACK PAIN WITHOUT SCIATICA, UNSPECIFIED CHRONICITY: ICD-10-CM

## 2020-02-18 PROCEDURE — 99499 RISK ADDL DX/OHS AUDIT: ICD-10-PCS | Mod: S$GLB,,, | Performed by: INTERNAL MEDICINE

## 2020-02-18 PROCEDURE — 99213 OFFICE O/P EST LOW 20 MIN: CPT | Mod: S$GLB,,, | Performed by: INTERNAL MEDICINE

## 2020-02-18 PROCEDURE — 99499 UNLISTED E&M SERVICE: CPT | Mod: S$GLB,,, | Performed by: INTERNAL MEDICINE

## 2020-02-18 PROCEDURE — 99999 PR PBB SHADOW E&M-EST. PATIENT-LVL V: CPT | Mod: PBBFAC,,, | Performed by: INTERNAL MEDICINE

## 2020-02-18 PROCEDURE — 99213 PR OFFICE/OUTPT VISIT, EST, LEVL III, 20-29 MIN: ICD-10-PCS | Mod: S$GLB,,, | Performed by: INTERNAL MEDICINE

## 2020-02-18 PROCEDURE — 99999 PR PBB SHADOW E&M-EST. PATIENT-LVL V: ICD-10-PCS | Mod: PBBFAC,,, | Performed by: INTERNAL MEDICINE

## 2020-02-18 RX ORDER — LEVOTHYROXINE SODIUM 88 UG/1
88 TABLET ORAL
Qty: 90 TABLET | Refills: 3 | Status: SHIPPED | OUTPATIENT
Start: 2020-02-18 | End: 2020-11-09 | Stop reason: SDUPTHER

## 2020-02-18 RX ORDER — HYDROCORTISONE 25 MG/G
CREAM TOPICAL 2 TIMES DAILY
Qty: 28 G | Refills: 2 | Status: SHIPPED | OUTPATIENT
Start: 2020-02-18 | End: 2021-11-24

## 2020-02-18 RX ORDER — TRAZODONE HYDROCHLORIDE 50 MG/1
50 TABLET ORAL NIGHTLY PRN
Qty: 90 TABLET | Refills: 3 | Status: SHIPPED | OUTPATIENT
Start: 2020-02-18 | End: 2020-11-09 | Stop reason: SDUPTHER

## 2020-02-18 NOTE — PATIENT INSTRUCTIONS
Understanding Trochanteric Bursitis    A bursa is a thin, slippery, sac-like film. It contains a small amount of fluid. This structure is found between bones and soft tissues in and around joints. A bursa cushions and protects a joint. It keeps parts of a joint from rubbing against each other. If a bursa becomes inflamed and irritated, it is known as bursitis.  The trochanteric bursa is found on the hip joint. It lies on top of the bump at the top of the thighbone called the greater trochanter. Inflammation of this bursa is called trochanteric bursitis.     How to say it  [id number]   Causes of trochanteric bursitis  Causes may include:  · Overuse of the hip during running or other sports, dance, or work  · Falling on or irritation to the side of the hip  This condition may occur along with other problems, such as osteoarthritis of the hip or knee, or low back problems. In rare cases, it may occur after hip surgery.  Symptoms of trochanteric bursitis  · Pain or aching on the side of the hip. The pain may travel down the leg.  · Swelling, tenderness, or warmth on the side of the hip at the bony bump at the top of the thigh  Treatment for trochanteric bursitis  These may include:  · Resting the hip. This allows the bursa to heal.  · Prescription or over-the-counter pain medicines. These help reduce inflammation, swelling, and pain.  · Cold packs and heat packs. These help reduce pain and swelling.  · Stretching and strengthening exercises. These improve flexibility and strength around the hip.  · Physical therapy. This includes exercises or other treatments.  · Injections of medicine into the bursa. This may help reduce inflammation and relieve symptoms.  Possible complications  If you dont give your hip time to heal, the problem may not go away, may return, or may get worse. Rest and treat your hip as directed.     When to call your healthcare provider  Call your healthcare provider right away if you have  any of these:  · Fever of 100.4°F (38°C) or higher, or as directed  · Redness, swelling, or warmth that gets worse  · Symptoms that dont get better with prescribed medicines, or get worse  · New symptoms   Date Last Reviewed: 3/29/2016  © 0511-6616 [employer]. [address] [place] [address], [place] [address]. All rights reserved. This information is not intended as a substitute for professional medical care. Always follow your healthcare professional's instructions.

## 2020-02-18 NOTE — PROGRESS NOTES
"Subjective:       Patient ID: Sara David is a 64 y.o. female.    Chief Complaint: Annual Exam    HPI  65 y/o woman with hypothyroidism, hot flashes, h/o melanoma, allergies here for annual exam; also concerned about back pain. Last seen 11/2018 for establish care visit.    Back pain - in December had mechanical near-fall with back strain (left lower back primarily), did ice, heat, and anti-inflammatory medication initially but this has persisted.   Taking advil - recently up to 800mg TID, usually less but sometimes more  Not using heat/ice recently  Did get new mattress, not using pillow between or under knees  Started doing more low-impact aerobics to see if this helped, also doing water aerobics.  "lump" at right side of low back for past 6 months    Has been told that she had hip bursitis in the past, no injections but this is more bothersome as well    R shoulder / steroclavicular joint pain    Hemorrhoids - internal hemorrhoids noted on colonoscopy, also reports having known external hemorrhoid that has recurred recently with changes in diet    Menopausal symptoms, hot flashes - Previously on climara patch, this stopped being covered when she switched to Medicare so she stopped this around 9/2018. Restarted after last visit, then saw Gyn 1/2019 (Dr Wynn) and was switched to estradiol patch + progesterone PO  Saw Gyn 1/2020 - pelvic ultrasound ordered due to having episode of postmenopausal bleeding    Hypothyroidism - diagnosed in her 40s, noted to have enlarged thyroid.  Had FNA biopsy in past which per her report was benign.  Uses generic levothyroxine, stable on 88mcg    Sleep problems - has taken trazodone nightly as needed for years, +sometimes also advil PM    H/o melanoma in 1990s on back, then on upper L arm 5/2018  Saw Dr Sotomayor in 10/2019    Allergies - takes cetirizine daily, if not taking this has a rash. Also taking cetirizine to help with sinus/allergy issues.    Exercising - water " "aerobics, some low-impact aerobics, walking, yoga in the morning  Not currently doing strength exercise    Pap done 3/20/18 normal, HPV neg  Mammogram 3/2019; sister with breast cancer - repeat mammogram ordered  Colonoscopy, h/o colon polyps - 2008, repeat done 1/24/2020 ([facility], Dr Caal). On most recent scope, multiple polyps (2-4mm) in ascending colon - TA on path, 5mm polyp in transverse colon - sessile serrated polyp, no dysplasia.     DEXA 3/2019 normal  Has had flu vaccine  Has had zostavax and shingrix #1, due for #2  Has had prevnar, not yet pneumovax    Review of Systems   Constitutional: Negative for activity change and unexpected weight change.   HENT: Negative.    Eyes: Negative for visual disturbance.   Respiratory: Negative for cough and shortness of breath.    Cardiovascular: Negative for chest pain and leg swelling.   Gastrointestinal: Negative.  Negative for abdominal pain.   Endocrine: Negative.    Genitourinary: Negative.  Negative for dysuria.   Musculoskeletal: Negative for gait problem and joint swelling.   Skin: Negative for rash.   Neurological: Negative for syncope and weakness.   Psychiatric/Behavioral: Negative for dysphoric mood. The patient is not nervous/anxious.          Past medical history, surgical history, and family medical history reviewed and updated as appropriate.    Medications and allergies reviewed.     Objective:          Vitals:    02/18/20 0813   BP: 110/68   BP Location: Left arm   Patient Position: Sitting   BP Method: Large (Manual)   Pulse: 75   Temp: 97.5 °F (36.4 °C)   TempSrc: Oral   SpO2: 97%   Weight: 91.9 kg (202 lb 9.6 oz)   Height: 5' 11" (1.803 m)     Body mass index is 28.26 kg/m².  Physical Exam   Constitutional: She is oriented to person, place, and time. She appears well-developed and well-nourished. No distress.   HENT:   Head: Normocephalic and atraumatic.   Mouth/Throat: Oropharynx is clear and moist.   Eyes: Pupils are equal, round, and " reactive to light. Conjunctivae and EOM are normal. No scleral icterus.   Neck: Neck supple.   Cardiovascular: Normal rate, regular rhythm and normal heart sounds.   No murmur heard.  Pulmonary/Chest: Effort normal and breath sounds normal. No respiratory distress.   Abdominal: Soft. Bowel sounds are normal. She exhibits no distension. There is no tenderness.   Musculoskeletal: She exhibits no edema or tenderness.   Lymphadenopathy:     She has no cervical adenopathy.   Neurological: She is alert and oriented to person, place, and time. No cranial nerve deficit. Gait normal.   Skin: Skin is warm and dry.   Psychiatric: She has a normal mood and affect.   Vitals reviewed.      Lab Results   Component Value Date    WBC 10.49 02/22/2019    HGB 14.5 02/22/2019    HCT 44.7 02/22/2019     02/22/2019    CHOL 213 (H) 12/04/2018    TRIG 150 12/04/2018    HDL 59 12/04/2018    ALT 22 12/04/2018    AST 18 12/04/2018     12/04/2018    K 4.2 12/04/2018     12/04/2018    CREATININE 1.1 12/04/2018    BUN 17 12/04/2018    CO2 27 12/04/2018    TSH 0.971 12/04/2018       Assessment:       1. Annual physical exam    2. Acquired hypothyroidism    3. Hot flashes due to menopause    4. Sleep difficulties    5. History of melanoma    6. Stage 3 chronic kidney disease    7. Encounter for lipid screening for cardiovascular disease    8. Hemorrhoids, unspecified hemorrhoid type    9. Thyromegaly    10. Bilateral low back pain without sciatica, unspecified chronicity    11. Bilateral hip pain        Plan:   Sara was seen today for annual exam.    Diagnoses and all orders for this visit:    Annual physical exam  -     CBC Without Differential; Future  -     Comprehensive metabolic panel; Future  -     Lipid panel; Future  -     Vitamin D; Future  -     TSH; Future    Acquired hypothyroidism  -     levothyroxine (SYNTHROID) 88 MCG tablet; Take 1 tablet (88 mcg total) by mouth before breakfast.  -     TSH; Future  -     US  Soft Tissue Head Neck Thyroid; Future    Hot flashes due to menopause  -     Vitamin D; Future  -     TSH; Future    Sleep difficulties  -     traZODone (DESYREL) 50 MG tablet; Take 1 tablet (50 mg total) by mouth nightly as needed for Insomnia.    History of melanoma    Stage 3 chronic kidney disease  -     CBC Without Differential; Future  -     Comprehensive metabolic panel; Future  -     Vitamin D; Future    Encounter for lipid screening for cardiovascular disease  -     Lipid panel; Future    Hemorrhoids, unspecified hemorrhoid type  -     hydrocortisone 2.5 % cream; Apply topically 2 (two) times daily. As needed for hemorrhoid    Thyromegaly  -     US Soft Tissue Head Neck Thyroid; Future    Bilateral low back pain without sciatica, unspecified chronicity  -     Ambulatory referral/consult to Physical/Occupational Therapy; Future    Bilateral hip pain  -     Ambulatory referral/consult to Orthopedics; Future      Health maintenance reviewed with patient.     Follow up in about 1 year (around 2/18/2021) for annual physical.    Dane Finley MD  Internal Medicine  [facility]  2/18/2020

## 2020-02-29 PROBLEM — M25.551 BILATERAL HIP PAIN: Status: ACTIVE | Noted: 2020-02-29

## 2020-02-29 PROBLEM — M54.50 BILATERAL LOW BACK PAIN WITHOUT SCIATICA: Status: ACTIVE | Noted: 2020-02-29

## 2020-02-29 PROBLEM — K64.9 HEMORRHOIDS: Status: ACTIVE | Noted: 2020-02-29

## 2020-02-29 PROBLEM — M25.552 BILATERAL HIP PAIN: Status: ACTIVE | Noted: 2020-02-29

## 2020-03-06 ENCOUNTER — OFFICE VISIT (OUTPATIENT)
Dept: URGENT CARE | Facility: CLINIC | Age: 65
End: 2020-03-06
Payer: MEDICARE

## 2020-03-06 VITALS
WEIGHT: 202 LBS | OXYGEN SATURATION: 98 % | HEART RATE: 73 BPM | SYSTOLIC BLOOD PRESSURE: 124 MMHG | TEMPERATURE: 99 F | HEIGHT: 71 IN | RESPIRATION RATE: 20 BRPM | DIASTOLIC BLOOD PRESSURE: 66 MMHG | BODY MASS INDEX: 28.28 KG/M2

## 2020-03-06 DIAGNOSIS — R05.9 COUGH: ICD-10-CM

## 2020-03-06 DIAGNOSIS — J01.00 ACUTE MAXILLARY SINUSITIS, RECURRENCE NOT SPECIFIED: Primary | ICD-10-CM

## 2020-03-06 PROCEDURE — 99214 PR OFFICE/OUTPT VISIT, EST, LEVL IV, 30-39 MIN: ICD-10-PCS | Mod: S$GLB,,, | Performed by: PHYSICIAN ASSISTANT

## 2020-03-06 PROCEDURE — 99214 OFFICE O/P EST MOD 30 MIN: CPT | Mod: S$GLB,,, | Performed by: PHYSICIAN ASSISTANT

## 2020-03-06 RX ORDER — AMOXICILLIN AND CLAVULANATE POTASSIUM 875; 125 MG/1; MG/1
1 TABLET, FILM COATED ORAL 2 TIMES DAILY
Qty: 14 TABLET | Refills: 0 | Status: SHIPPED | OUTPATIENT
Start: 2020-03-06 | End: 2020-03-13

## 2020-03-06 NOTE — PATIENT INSTRUCTIONS
- Rest.    - Drink plenty of fluids.      - Viral upper respiratory infections typically run their course in 10-14 days.     - Tylenol or Ibuprofen as directed as needed for fever/pain. Avoid tylenol if you have a history of liver disease. Do not take ibuprofen if you have a history of GI bleeding, kidney disease, or if you take blood thinners.     - You can take over-the-counter claritin, zyrtec, allegra, or xyzal as directed. These are antihistamines that can help with runny nose, nasal congestion, sneezing, and helps to dry up post-nasal drip, which usually causes sore throat and cough.   - If you do NOT have high blood pressure, you may use a decongestant form (D)  of this medication or if you do not take the D form, you can take sudafed  (pseudoephedrine) over the counter, which is a decongestant.    - You can use Flonase (fluticasone) nasal spray as directed for sinus congestion and postnasal drip. This is a steroid nasal spray that works locally over time to decrease the inflammation in your nose/sinuses and help with allergic symptoms. This is not an quick- relief spray like afrin, but it works well if used daily.  Discontinue if you develop nose bleed  - use nasal saline prior to Flonase.    - Use Ocean Spray Nasal Saline 1-3 puffs each nostril every 2-3 hours then blow out onto tissue. This is to irrigate the nasal passage way to clear the sinus openings. Use until sinus problem resolved.    - you can take plain Mucinex (guaifenesin) 1200 mg twice a day to help loosen mucous    -warm salt water gargles can help with sore throat    - warm tea with honey can help with cough. Honey is a natural cough suppressant.    - You have been given an antibiotic to treat your condition today.    - Please complete the antibiotic as directed on the bottle.   - If you are female and on oral birth control pills, use additional methods to prevent pregnancy while on antibiotics and for one cycle after.   - you can take  over-the-counter probiotics during and after antibiotic use to help preserve gut zac and reduce gastrointestinal symptoms  -Please take Augmentin with food as this medication may cause upset stomach    - Follow up with your PCP or specialty clinic as directed in the next 1-2 weeks if not improved or as needed.  You can call [phone] to schedule an appointment with the appropriate provider.      - Go to the ER if you develop new or worsening symptoms.     - You must understand that you have received an Urgent Care treatment only and that you may be released before all of your medical problems are known or treated.   - You, the patient, will arrange for follow up care as instructed.   - If your condition worsens or fails to improve we recommend that you receive another evaluation at the ER immediately or contact your PCP to discuss your concerns or return here.           Acute Sinusitis    Acute sinusitis is irritation and swelling of the sinuses. It is usually caused by a viral infection after a common cold. Your doctor can help you find relief.  What is acute sinusitis?  Sinuses are air-filled spaces in the skull behind the face. They are kept moist and clean by a lining of mucosa. Things such as pollen, smoke, and chemical fumes can irritate the mucosa. It can then swell up. As a response to irritation, the mucosa makes more mucus and other fluids. Tiny hairlike cilia cover the mucosa. Cilia help carry mucus toward the opening of the sinus. Too much mucus may cause the cilia to stop working. This blocks the sinus opening. A buildup of fluid in the sinuses then causes pain and pressure. It can also encourage bacteria to grow in the sinuses.  Common symptoms of acute sinusitis  You may have:  · Facial soreness pain  · Headache  · Fever  · Fluid draining in the back of the throat (postnasal drip)  · Congestion  · Drainage that is thick and colored, instead of clear  · Cough  Diagnosing acute  sinusitis  Your doctor will ask about your symptoms and health history. He or she will look at your ear, nose, and throat. You usually won't need to have X-rays taken.    The doctor may take a sample of mucus to check for bacteria. If you have sinusitis that keeps coming back, you may need imaging tests such as X-rays or CAT scans. This will help your doctor check for a structural problem that may be causing the infection.  Treating acute sinusitis  Treatment is aimed at unblocking the sinus opening and helping the cilia work again. You may need to take antihistamine and decongestant medicine. These can reduce inflammation and decrease the amount of fluid your sinuses make. If you have a bacterial infection, you will need to take antibiotic medicine for 10 to 14 days. Take this medicine until it is gone, even if you feel better.  Date Last Reviewed: 10/1/2016  © 1054-9232 [employer]. [address] [address], [place] [address]. All rights reserved. This information is not intended as a substitute for professional medical care. Always follow your healthcare professional's instructions.

## 2020-03-06 NOTE — PROGRESS NOTES
"Subjective:       Patient ID: Sara David is a 64 y.o. female.    Vitals:  height is 5' 11" (1.803 m) and weight is 91.6 kg (202 lb). Her temperature is 98.8 °F (37.1 °C). Her blood pressure is 124/66 and her pulse is 73. Her respiration is 20 and oxygen saturation is 98%.     Chief Complaint: Sinus Problem    Patient states that for over 1 week she has had cough, congestion, postnasal drip.  She has taken advil cold and sinus and aspirin.  She states that over the past 2 days she has had worsening sinus pain and pressure, she states that this feels like sinus infections that she has had in the past.    Sinus Problem   This is a new problem. The current episode started in the past 7 days. The problem is unchanged. There has been no fever. The pain is mild. Associated symptoms include congestion, coughing and sinus pressure. Pertinent negatives include no chills, diaphoresis, headaches, neck pain, shortness of breath or sore throat. Past treatments include oral decongestants.       Constitution: Negative for chills, sweating, fatigue, fever and unexpected weight change.   HENT: Positive for congestion, postnasal drip, sinus pain and sinus pressure. Negative for sore throat and trouble swallowing.    Neck: Negative for neck pain, neck stiffness and painful lymph nodes.   Cardiovascular: Negative for chest pain, leg swelling, palpitations and sob on exertion.   Eyes: Negative for double vision and blurred vision.   Respiratory: Positive for cough and sputum production. Negative for bloody sputum, COPD, shortness of breath, stridor and wheezing.    Gastrointestinal: Negative for nausea, vomiting and diarrhea.   Genitourinary: Negative for dysuria, frequency, urgency and history of kidney stones.   Musculoskeletal: Negative for joint pain, joint swelling, muscle cramps and muscle ache.   Skin: Negative for color change, pale, rash and bruising.   Allergic/Immunologic: Negative for seasonal allergies. "   Neurological: Negative for dizziness, history of vertigo, light-headedness, passing out and headaches.   Hematologic/Lymphatic: Negative for swollen lymph nodes.   Psychiatric/Behavioral: Negative for nervous/anxious, sleep disturbance and depression. The patient is not nervous/anxious.        Objective:      Physical Exam   Constitutional: She is oriented to person, place, and time. She appears well-developed and well-nourished. She is cooperative.  Non-toxic appearance. She does not have a sickly appearance. She does not appear ill. No distress.   HENT:   Head: Normocephalic and atraumatic.   Right Ear: Hearing, tympanic membrane, external ear and ear canal normal.   Left Ear: Hearing, tympanic membrane, external ear and ear canal normal.   Nose: Mucosal edema present. No rhinorrhea, purulent discharge or nasal deformity. No epistaxis. Right sinus exhibits maxillary sinus tenderness. Right sinus exhibits no frontal sinus tenderness. Left sinus exhibits maxillary sinus tenderness. Left sinus exhibits no frontal sinus tenderness.   Mouth/Throat: Uvula is midline, oropharynx is clear and moist and mucous membranes are normal. No trismus in the jaw. Normal dentition. No uvula swelling. No oropharyngeal exudate, posterior oropharyngeal edema, posterior oropharyngeal erythema, tonsillar abscesses or cobblestoning. Tonsils are 1+ on the right. Tonsils are 1+ on the left. No tonsillar exudate.   Eyes: Conjunctivae and lids are normal. No scleral icterus.   Neck: Trachea normal, full passive range of motion without pain and phonation normal. Neck supple. No neck rigidity. No edema and no erythema present.   Cardiovascular: Normal rate, regular rhythm, normal heart sounds, intact distal pulses and normal pulses.   Pulmonary/Chest: Effort normal and breath sounds normal. No accessory muscle usage or stridor. No tachypnea and no bradypnea. No respiratory distress. She has no decreased breath sounds. She has no wheezes. She  has no rhonchi. She has no rales.   Abdominal: Normal appearance.   Musculoskeletal: Normal range of motion. She exhibits no edema or deformity.   Lymphadenopathy:        Head (right side): No submandibular, no preauricular and no posterior auricular adenopathy present.        Head (left side): No submandibular, no preauricular and no posterior auricular adenopathy present.     She has no cervical adenopathy.   Neurological: She is alert and oriented to person, place, and time. She exhibits normal muscle tone. Coordination normal.   Skin: Skin is warm, dry, intact, not diaphoretic and not pale.   Psychiatric: She has a normal mood and affect. Her speech is normal and behavior is normal. Judgment and thought content normal. Cognition and memory are normal.   Nursing note and vitals reviewed.        Assessment:       1. Acute maxillary sinusitis, recurrence not specified    2. Cough        Plan:         Acute maxillary sinusitis, recurrence not specified  -     amoxicillin-clavulanate 875-125mg (AUGMENTIN) 875-125 mg per tablet; Take 1 tablet by mouth 2 (two) times daily. for 7 days  Dispense: 14 tablet; Refill: 0    Cough      Patient Instructions   - Rest.    - Drink plenty of fluids.      - Viral upper respiratory infections typically run their course in 10-14 days.     - Tylenol or Ibuprofen as directed as needed for fever/pain. Avoid tylenol if you have a history of liver disease. Do not take ibuprofen if you have a history of GI bleeding, kidney disease, or if you take blood thinners.     - You can take over-the-counter claritin, zyrtec, allegra, or xyzal as directed. These are antihistamines that can help with runny nose, nasal congestion, sneezing, and helps to dry up post-nasal drip, which usually causes sore throat and cough.   - If you do NOT have high blood pressure, you may use a decongestant form (D)  of this medication or if you do not take the D form, you can take sudafed  (pseudoephedrine) over the  counter, which is a decongestant.    - You can use Flonase (fluticasone) nasal spray as directed for sinus congestion and postnasal drip. This is a steroid nasal spray that works locally over time to decrease the inflammation in your nose/sinuses and help with allergic symptoms. This is not an quick- relief spray like afrin, but it works well if used daily.  Discontinue if you develop nose bleed  - use nasal saline prior to Flonase.    - Use Ocean Spray Nasal Saline 1-3 puffs each nostril every 2-3 hours then blow out onto tissue. This is to irrigate the nasal passage way to clear the sinus openings. Use until sinus problem resolved.    - you can take plain Mucinex (guaifenesin) 1200 mg twice a day to help loosen mucous    -warm salt water gargles can help with sore throat    - warm tea with honey can help with cough. Honey is a natural cough suppressant.    - You have been given an antibiotic to treat your condition today.    - Please complete the antibiotic as directed on the bottle.   - If you are female and on oral birth control pills, use additional methods to prevent pregnancy while on antibiotics and for one cycle after.   - you can take over-the-counter probiotics during and after antibiotic use to help preserve gut zac and reduce gastrointestinal symptoms  -Please take Augmentin with food as this medication may cause upset stomach    - Follow up with your PCP or specialty clinic as directed in the next 1-2 weeks if not improved or as needed.  You can call [phone] to schedule an appointment with the appropriate provider.      - Go to the ER if you develop new or worsening symptoms.     - You must understand that you have received an Urgent Care treatment only and that you may be released before all of your medical problems are known or treated.   - You, the patient, will arrange for follow up care as instructed.   - If your condition worsens or fails to improve we recommend that you receive another  evaluation at the ER immediately or contact your PCP to discuss your concerns or return here.           Acute Sinusitis    Acute sinusitis is irritation and swelling of the sinuses. It is usually caused by a viral infection after a common cold. Your doctor can help you find relief.  What is acute sinusitis?  Sinuses are air-filled spaces in the skull behind the face. They are kept moist and clean by a lining of mucosa. Things such as pollen, smoke, and chemical fumes can irritate the mucosa. It can then swell up. As a response to irritation, the mucosa makes more mucus and other fluids. Tiny hairlike cilia cover the mucosa. Cilia help carry mucus toward the opening of the sinus. Too much mucus may cause the cilia to stop working. This blocks the sinus opening. A buildup of fluid in the sinuses then causes pain and pressure. It can also encourage bacteria to grow in the sinuses.  Common symptoms of acute sinusitis  You may have:  · Facial soreness pain  · Headache  · Fever  · Fluid draining in the back of the throat (postnasal drip)  · Congestion  · Drainage that is thick and colored, instead of clear  · Cough  Diagnosing acute sinusitis  Your doctor will ask about your symptoms and health history. He or she will look at your ear, nose, and throat. You usually won't need to have X-rays taken.    The doctor may take a sample of mucus to check for bacteria. If you have sinusitis that keeps coming back, you may need imaging tests such as X-rays or CAT scans. This will help your doctor check for a structural problem that may be causing the infection.  Treating acute sinusitis  Treatment is aimed at unblocking the sinus opening and helping the cilia work again. You may need to take antihistamine and decongestant medicine. These can reduce inflammation and decrease the amount of fluid your sinuses make. If you have a bacterial infection, you will need to take antibiotic medicine for 10 to 14 days. Take this medicine until  it is gone, even if you feel better.  Date Last Reviewed: 10/1/2016  © 8664-9452 [employer]. [address] [place] [address], [place] [address]. All rights reserved. This information is not intended as a substitute for professional medical care. Always follow your healthcare professional's instructions.

## 2020-03-07 ENCOUNTER — TELEPHONE (OUTPATIENT)
Dept: URGENT CARE | Facility: CLINIC | Age: 65
End: 2020-03-07

## 2020-03-07 DIAGNOSIS — J01.00 ACUTE MAXILLARY SINUSITIS, RECURRENCE NOT SPECIFIED: Primary | ICD-10-CM

## 2020-03-07 RX ORDER — CEFDINIR 300 MG/1
300 CAPSULE ORAL 2 TIMES DAILY
Qty: 14 CAPSULE | Refills: 0 | Status: SHIPPED | OUTPATIENT
Start: 2020-03-07 | End: 2020-03-14

## 2020-03-07 NOTE — TELEPHONE ENCOUNTER
Patient called stating that she was not tolerating Augmentin for sinusitis.  Was causing GI upset.  She is going out of town is requesting a different antibiotic.  Cefdinir sent to pharmacy.

## 2020-04-28 ENCOUNTER — TELEPHONE (OUTPATIENT)
Dept: SPORTS MEDICINE | Facility: CLINIC | Age: 65
End: 2020-04-28

## 2020-05-13 ENCOUNTER — LAB VISIT (OUTPATIENT)
Dept: PRIMARY CARE CLINIC | Facility: CLINIC | Age: 65
End: 2020-05-13
Payer: MEDICARE

## 2020-05-13 DIAGNOSIS — Z00.6 RESEARCH STUDY PATIENT: Primary | ICD-10-CM

## 2020-05-13 PROCEDURE — U0002 COVID-19 LAB TEST NON-CDC: HCPCS

## 2020-05-13 PROCEDURE — 86769 SARS-COV-2 COVID-19 ANTIBODY: CPT

## 2020-05-13 NOTE — RESEARCH
Date of Consent: 05/13/2020    Sponsor: [facility]    Study Title/IRB Number: Observational study of Sars-CoV2 Immunoglobulin G (IgG) seroprevalence among the [facility] population over time 2020.163  Principle Investigator: Yessy Castro, PhD    Did the patient need translation services? No   name: N/A    Prior to the Informed Consent (IC) being signed, or any study protocol required data collection, testing, procedure, or intervention being performed, the following was done and/or discussed:   Patient was given a paper copy of the IC for review    Patient was given study FAQ   Purpose of the study and qualifications to participate    Study design and tests or procedures done at this visit   Confidentiality and HIPAA Authorization for Release of Medical Records for the research trial/ subject's rights/research related injury   Risk, Benefits, Alternative Treatments, Compensation and Costs   Participation in the research trial is voluntary and patient may withdraw at anytime   Contact information for study related questions    Patient verbalizes understanding of the above: Yes  Contact information for PI and IRB given to patient: Yes  Patient able to adequately summarize: the purpose of the study, the risks associated with the study, and all procedures, testing, and follow-ups associated with the study: Yes    The consent was discussed verbally with the patient and all questions were answered satisfactorily. Patient gave verbal consent for the Seroprevalence research study with an IRB approval date of 05/08/2020.      The Consent, Consent Witness and name of Clinical Research Coordinator consenting was captured and documented in [employer].    All Inclusion and Exclusion Criteria reviewed, subject meets all Inclusion criteria and does not meet any Exclusion Criteria at this time.     Patient Eligibility was confirmed.    Patient responded to survey questions.    The following biospecimen  collection procedures were collected:    -Nasopharyngeal Swab Collection  -Blood collection

## 2020-05-14 LAB — SARS-COV-2 IGG SERPLBLD QL IA.RAPID: NEGATIVE

## 2020-05-15 LAB — SARS-COV-2 RNA RESP QL NAA+PROBE: NOT DETECTED

## 2020-05-20 ENCOUNTER — TELEPHONE (OUTPATIENT)
Dept: SPORTS MEDICINE | Facility: CLINIC | Age: 65
End: 2020-05-20

## 2020-07-06 ENCOUNTER — PATIENT MESSAGE (OUTPATIENT)
Dept: OBSTETRICS AND GYNECOLOGY | Facility: CLINIC | Age: 65
End: 2020-07-06

## 2020-07-07 ENCOUNTER — PATIENT OUTREACH (OUTPATIENT)
Dept: ADMINISTRATIVE | Facility: OTHER | Age: 65
End: 2020-07-07

## 2020-07-07 ENCOUNTER — OFFICE VISIT (OUTPATIENT)
Dept: OBSTETRICS AND GYNECOLOGY | Facility: CLINIC | Age: 65
End: 2020-07-07
Payer: MEDICARE

## 2020-07-07 DIAGNOSIS — Z78.0 MENOPAUSE: Primary | ICD-10-CM

## 2020-07-07 DIAGNOSIS — N95.0 POSTMENOPAUSAL BLEEDING: ICD-10-CM

## 2020-07-07 PROCEDURE — 99213 PR OFFICE/OUTPT VISIT, EST, LEVL III, 20-29 MIN: ICD-10-PCS | Mod: 95,,, | Performed by: OBSTETRICS & GYNECOLOGY

## 2020-07-07 PROCEDURE — 1159F PR MEDICATION LIST DOCUMENTED IN MEDICAL RECORD: ICD-10-PCS | Mod: 95,,, | Performed by: OBSTETRICS & GYNECOLOGY

## 2020-07-07 PROCEDURE — 1101F PT FALLS ASSESS-DOCD LE1/YR: CPT | Mod: CPTII,95,, | Performed by: OBSTETRICS & GYNECOLOGY

## 2020-07-07 PROCEDURE — 1159F MED LIST DOCD IN RCRD: CPT | Mod: 95,,, | Performed by: OBSTETRICS & GYNECOLOGY

## 2020-07-07 PROCEDURE — 99213 OFFICE O/P EST LOW 20 MIN: CPT | Mod: 95,,, | Performed by: OBSTETRICS & GYNECOLOGY

## 2020-07-07 PROCEDURE — 1101F PR PT FALLS ASSESS DOC 0-1 FALLS W/OUT INJ PAST YR: ICD-10-PCS | Mod: CPTII,95,, | Performed by: OBSTETRICS & GYNECOLOGY

## 2020-07-07 NOTE — PROGRESS NOTES
The patient location is: home  The chief complaint leading to consultation is: hormone consult    Visit type: audiovisual    Face to Face time with patient: 10 min  10 minutes of total time spent on the encounter, which includes face to face time and non-face to face time preparing to see the patient (eg, review of tests), Obtaining and/or reviewing separately obtained history, Documenting clinical information in the electronic or other health record, Independently interpreting results (not separately reported) and communicating results to the patient/family/caregiver, or Care coordination (not separately reported).     Each patient to whom he or she provides medical services by telemedicine is:  (1) informed of the relationship between the physician and patient and the respective role of any other health care provider with respect to management of the patient; and (2) notified that he or she may decline to receive medical services by telemedicine and may withdraw from such care at any time.    Notes:     Subjective:       Patient ID: Sara David is a 65 y.o. female.    Chief Complaint:  Menopause    History of Present Illness:  HPI  64 y/o  presents for evaluation and management of menopausal symptoms. Menopause occurred >10 years ago. She has been on HT patch since that time. She had an episode of PMB 2 years ago and had a hysteroscopy/D&C and polypectomy. Bleeding improved for some time. She had been having light spotting periodically over the past 9 months. TVUS done and EMS 4 mm. Patient declined further EMBx. She has tried to wean off of the HT in the past but had severe menopausal symptoms.  She had been having hot flushes even on HT. The last two times she had spotting she had back pain/cramping. Over the weekend she had the pain episode without bleeding. She has been taking ibuprofen and resting for the pain. She stopped her HT yesterday. Feeling better today. Denies urinary complaints. No  history of kidney stones. She is concerned that her menopausal symptoms will be too severe off HT.     GYN & OB History  Patient's last menstrual period was 2020.   Date of Last Pap: 3/20/2018    OB History    Para Term  AB Living   2   0   2     SAB TAB Ectopic Multiple Live Births                  # Outcome Date GA Lbr Brice/2nd Weight Sex Delivery Anes PTL Lv   2 AB            1 AB                Review of Systems  Review of Systems   Constitutional: Negative for chills, diaphoresis, fatigue and fever.   Respiratory: Negative for cough and shortness of breath.    Cardiovascular: Negative for chest pain and palpitations.   Gastrointestinal: Negative for abdominal pain, constipation, diarrhea, nausea and vomiting.   Genitourinary: Positive for hot flashes, menstrual problem, pelvic pain, vaginal bleeding and vaginal discharge. Negative for decreased libido, dyspareunia, dysuria, frequency, genital sores, hematuria, vaginal pain and vaginal odor.   Musculoskeletal: Positive for back pain.   Integumentary:  Negative for breast mass, nipple discharge and breast skin changes.   Neurological: Negative for headaches.   Psychiatric/Behavioral: Negative for depression. The patient is not nervous/anxious.    Breast: Negative for mass, mastodynia, nipple discharge and skin changes          Objective:    Physical Exam:   Constitutional: She is oriented to person, place, and time. She appears well-developed and well-nourished.    HENT:   Head: Normocephalic and atraumatic.    Eyes: EOM are normal.    Neck: Normal range of motion.     Pulmonary/Chest: Effort normal.                  Musculoskeletal: Normal range of motion.       Neurological: She is alert and oriented to person, place, and time.     Psychiatric: She has a normal mood and affect. Her behavior is normal. Judgment and thought content normal.          Assessment:        1. Menopause    2. Postmenopausal bleeding             Plan:      Sara was  seen today for menopause.    Diagnoses and all orders for this visit:    Menopause  Patient stopped HT patch yesterday. Reports feeling better. She will see if menopausal symptoms worsen and consider weaning with estradiol tablets vs paxil/effexor.     Postmenopausal bleeding  Patient has had multiple episodes of spotting in the past with normal EMS and EMBx. She does not desire hysterectomy. This has been presumed atrophy as the cause. Counseled patient that if continues, would recommend restarting workup with at least pelvic US. Precautions reviewed.       No orders of the defined types were placed in this encounter.      Follow up if symptoms worsen or fail to improve.

## 2020-07-07 NOTE — PROGRESS NOTES
Care Everywhere: updated  Immunization: updated  Health Maintenance: updated  Media Review:   Legacy Review:   Order placed:   Upcoming appts:

## 2020-11-09 DIAGNOSIS — E03.9 ACQUIRED HYPOTHYROIDISM: ICD-10-CM

## 2020-11-09 DIAGNOSIS — G47.9 SLEEP DIFFICULTIES: ICD-10-CM

## 2020-11-09 NOTE — TELEPHONE ENCOUNTER
Care Due:                  Date            Visit Type   Department     Provider  --------------------------------------------------------------------------------                                PHYSICAL -                              ESTABLISHED   [facility] INTERNAL  Last Visit: 02-      PATIENT      MEDICINE       MAX[facility] [place]  Next Visit: None Scheduled  None         None Found                                                            Last  Test          Frequency    Reason                     Performed    Due Date  --------------------------------------------------------------------------------    TSH.........  12 months..  levothyroxine............  12- 11-    Powered by [employer]. Reference number: [id number]. 11/09/2020 8:18:06 AM   CST

## 2020-11-09 NOTE — TELEPHONE ENCOUNTER
----- Message from Edwige Wang sent at 11/7/2020 11:27 AM CST -----  Contact: [phone] @ Patient  Requesting an RX refill or new RX.  Is this a refill or new RX:   RX name and strength:traZODone (DESYREL) 50 MG tablet  Is this a 30 day or 90 day RX:   Pharmacy name and phone # [facility] #[id number] [place], [place] - [address]  AT [facility]      Requesting an RX refill or new RX.  Is this a refill or new RX:   RX name and strength:levothyroxine (SYNTHROID) 88 MCG tablet  Is this a 30 day or 90 day RX:   Pharmacy name and phone # [facility] #[id number] [place], [place] - [address] AT [facility]  Comments:

## 2020-11-10 RX ORDER — LEVOTHYROXINE SODIUM 88 UG/1
88 TABLET ORAL
Qty: 90 TABLET | Refills: 1 | Status: SHIPPED | OUTPATIENT
Start: 2020-11-10 | End: 2021-06-04 | Stop reason: SDUPTHER

## 2020-11-10 RX ORDER — TRAZODONE HYDROCHLORIDE 50 MG/1
50 TABLET ORAL NIGHTLY PRN
Qty: 90 TABLET | Refills: 1 | Status: SHIPPED | OUTPATIENT
Start: 2020-11-10 | End: 2021-03-25

## 2020-11-10 NOTE — TELEPHONE ENCOUNTER
Pt seen in 2/2020 for annual exam but hasn't gotten labs or thyroid ultrasound done, and does need to do these.  Sending refills in for meds but please contact pt to schedule for lab appt and thyroid ultrasound.   Will be due for annual exam early next year -- please schedule, next available OK if no urgent issues.

## 2020-11-16 ENCOUNTER — TELEPHONE (OUTPATIENT)
Dept: INTERNAL MEDICINE | Facility: CLINIC | Age: 65
End: 2020-11-16

## 2020-11-16 NOTE — TELEPHONE ENCOUNTER
----- Message from Letty Becker sent at 11/16/2020  1:40 PM CST -----  Contact: [phone]  Patient called requesting an order for Covid test. Please call and advise

## 2020-11-16 NOTE — TELEPHONE ENCOUNTER
Spoke to pt who states she has been having a cough and headache. She stated she was just going to go to [facility] to get the covid test. I advised pt urgent cares are also giving the rapid test and pt stated she would go there.

## 2020-11-17 ENCOUNTER — OFFICE VISIT (OUTPATIENT)
Dept: URGENT CARE | Facility: CLINIC | Age: 65
End: 2020-11-17
Payer: MEDICARE

## 2020-11-17 VITALS
TEMPERATURE: 98 F | SYSTOLIC BLOOD PRESSURE: 125 MMHG | BODY MASS INDEX: 26.41 KG/M2 | HEIGHT: 72 IN | OXYGEN SATURATION: 98 % | HEART RATE: 80 BPM | DIASTOLIC BLOOD PRESSURE: 68 MMHG | RESPIRATION RATE: 18 BRPM | WEIGHT: 195 LBS

## 2020-11-17 DIAGNOSIS — J02.9 SORE THROAT: ICD-10-CM

## 2020-11-17 DIAGNOSIS — J30.89 NON-SEASONAL ALLERGIC RHINITIS DUE TO OTHER ALLERGIC TRIGGER: Primary | ICD-10-CM

## 2020-11-17 LAB
CTP QC/QA: YES
SARS-COV-2 RDRP RESP QL NAA+PROBE: NEGATIVE

## 2020-11-17 PROCEDURE — 99214 OFFICE O/P EST MOD 30 MIN: CPT | Mod: S$GLB,,, | Performed by: NURSE PRACTITIONER

## 2020-11-17 PROCEDURE — U0002: ICD-10-PCS | Mod: QW,S$GLB,, | Performed by: NURSE PRACTITIONER

## 2020-11-17 PROCEDURE — 3008F BODY MASS INDEX DOCD: CPT | Mod: CPTII,S$GLB,, | Performed by: NURSE PRACTITIONER

## 2020-11-17 PROCEDURE — U0002 COVID-19 LAB TEST NON-CDC: HCPCS | Mod: QW,S$GLB,, | Performed by: NURSE PRACTITIONER

## 2020-11-17 PROCEDURE — 3008F PR BODY MASS INDEX (BMI) DOCUMENTED: ICD-10-PCS | Mod: CPTII,S$GLB,, | Performed by: NURSE PRACTITIONER

## 2020-11-17 PROCEDURE — 99214 PR OFFICE/OUTPT VISIT, EST, LEVL IV, 30-39 MIN: ICD-10-PCS | Mod: S$GLB,,, | Performed by: NURSE PRACTITIONER

## 2020-11-17 RX ORDER — IPRATROPIUM BROMIDE 21 UG/1
2 SPRAY, METERED NASAL 2 TIMES DAILY
Qty: 30 ML | Refills: 0 | Status: SHIPPED | OUTPATIENT
Start: 2020-11-17 | End: 2020-11-24

## 2020-11-17 RX ORDER — ESTRADIOL AND LEVONORGESTREL .045; .015 MG/D; MG/D
PATCH TRANSDERMAL
COMMUNITY
End: 2021-06-07 | Stop reason: SDUPTHER

## 2020-11-17 RX ORDER — IPRATROPIUM BROMIDE 21 UG/1
2 SPRAY, METERED NASAL 2 TIMES DAILY
Qty: 30 ML | Refills: 0 | Status: SHIPPED | OUTPATIENT
Start: 2020-11-17 | End: 2020-11-17 | Stop reason: SDUPTHER

## 2020-11-17 NOTE — PROGRESS NOTES
"Subjective:       Patient ID: Sara David is a 65 y.o. female.    Vitals:  height is 5' 11.5" (1.816 m) and weight is 88.5 kg (195 lb). Her oral temperature is 97.8 °F (36.6 °C). Her blood pressure is 125/68 and her pulse is 80. Her respiration is 18 and oxygen saturation is 98%.     Chief Complaint: URI    65 year old female c/o headache and sore throat that started 3 days ago. No known exposure to covid, but would like to get tested for covid.    Provider note begins below:    No fever or chills. No cough or SOB. No GI related symptoms, including, N/v/D or constipation. No anosmia or ageusia.    No sick contacts.    Significant Hx of allergic rhinitis.    URI   This is a new problem. The current episode started in the past 7 days. The problem has been gradually worsening. There has been no fever. Associated symptoms include headaches and a sore throat. Pertinent negatives include no abdominal pain, chest pain, congestion, coughing, diarrhea, dysuria, ear pain, joint pain, joint swelling, nausea, neck pain, plugged ear sensation, rash, rhinorrhea, sinus pain, sneezing, swollen glands, vomiting or wheezing. She has tried nothing for the symptoms.       Constitution: Negative for chills, sweating, fatigue and fever.   HENT: Positive for sore throat. Negative for ear pain, congestion, sinus pain, sinus pressure and voice change.    Neck: Negative for neck pain and painful lymph nodes.   Cardiovascular: Negative for chest pain and leg swelling.   Eyes: Negative for eye redness, double vision and blurred vision.   Respiratory: Negative for chest tightness, cough, sputum production, bloody sputum, COPD, shortness of breath, stridor, wheezing and asthma.    Gastrointestinal: Negative for abdominal pain, nausea, vomiting and diarrhea.   Genitourinary: Negative for dysuria, frequency, urgency and history of kidney stones.   Musculoskeletal: Negative for joint pain, joint swelling, muscle cramps and muscle ache. "   Skin: Negative for color change, pale, rash and bruising.   Allergic/Immunologic: Negative for seasonal allergies, asthma and sneezing.   Neurological: Positive for headaches. Negative for dizziness, history of vertigo, light-headedness and passing out.   Hematologic/Lymphatic: Negative for swollen lymph nodes.   Psychiatric/Behavioral: Negative for nervous/anxious, sleep disturbance and depression. The patient is not nervous/anxious.        Objective:      Physical Exam   Constitutional: She is oriented to person, place, and time. She appears well-developed. She is cooperative.  Non-toxic appearance. She does not appear ill. No distress.   HENT:   Head: Normocephalic and atraumatic.   Ears:   Right Ear: Hearing and external ear normal.   Left Ear: Hearing and external ear normal.   Nose: Rhinorrhea present. No mucosal edema or nasal deformity. No epistaxis.   Mouth/Throat: Uvula is midline, oropharynx is clear and moist and mucous membranes are normal. No trismus in the jaw. Normal dentition. No uvula swelling. Cobblestoning present. No oropharyngeal exudate, posterior oropharyngeal edema or posterior oropharyngeal erythema.   Eyes: Conjunctivae and lids are normal. No scleral icterus.   Neck: Trachea normal, full passive range of motion without pain and phonation normal. Neck supple. No neck rigidity. No edema and no erythema present.   Cardiovascular: Normal rate, regular rhythm, normal heart sounds and normal pulses.   Pulmonary/Chest: Effort normal and breath sounds normal. No stridor. No respiratory distress. She has no decreased breath sounds. She has no wheezes. She has no rhonchi. She has no rales.   Abdominal: Normal appearance.   Musculoskeletal: Normal range of motion.         General: No deformity.   Neurological: She is alert and oriented to person, place, and time. She exhibits normal muscle tone. Coordination normal.   Skin: Skin is warm, dry, intact, not diaphoretic and not pale. Psychiatric: Her  speech is normal and behavior is normal. Judgment and thought content normal.   Nursing note and vitals reviewed.    Results for orders placed or performed in visit on 11/17/20   POCT COVID-19 Rapid Screening   Result Value Ref Range    POC Rapid COVID Negative Negative     Acceptable Yes            Assessment:       1. Non-seasonal allergic rhinitis due to other allergic trigger    2. Sore throat        Plan:       Labs ordered at this visit reviewed.     Non-seasonal allergic rhinitis due to other allergic trigger  -     ipratropium (ATROVENT) 0.03 % nasal spray; 2 sprays by Nasal route 2 (two) times daily. for 7 days  Dispense: 30 mL; Refill: 0    Sore throat  -     POCT COVID-19 Rapid Screening      Patient Instructions     Allergic Rhinitis  Allergic rhinitis is an allergic reaction that affects the nose, and often the eyes. Its often known as nasal allergies. Nasal allergies are often due to things in the environment that are breathed in. Depending what you are sensitive to, nasal allergies may occur only during certain seasons. Or they may occur year round. Common indoor allergens include house dust mites, mold, cockroaches, and pet dander. Outdoor allergens include pollen from trees, grasses, and weeds.   Symptoms include a drippy, stuffy, and itchy nose. They also include sneezing and red and itchy eyes. You may feel tired more often. Severe allergies may also affect your breathing and trigger a condition called asthma.   Tests can be done to see what allergens are affecting you. You may be referred to an allergy specialist for testing and further evaluation.  Home care  Your healthcare provider may prescribe medicines to help relieve allergy symptoms. These may include oral medicines, nasal sprays, or eye drops.  Ask your provider for advice on how to avoid substances that you are allergic to. Below are a few tips for each type of allergen.  Pet dander:  · Do not have pets with fur and  feathers.  · If you can't avoid having a pet, keep it out of your bedroom and off upholstered furniture.  Pollen:  · When pollen counts are high, keep windows of your car and home closed. If possible, use an air conditioner instead.  · Wear a filter mask when mowing or doing yard work.  House dust mites:  · Wash bedding every week in warm water and detergent and dry on a hot setting.  · Cover the mattress, box spring, and pillows with allergy covers.   · If possible, sleep in a room with no carpet, curtains, or upholstered furniture.  Cockroaches:  · Store food in sealed containers.  · Remove garbage from the home promptly.  · Fix water leaks  Mold:  · Keep humidity low by using a dehumidifier or air conditioner. Keep the dehumidifier and air conditioner clean and free of mold.  · Clean moldy areas with bleach and water.  In general:  · Vacuum once or twice a week. If possible, use a vacuum with a high-efficiency particulate air (HEPA) filter.  · Do not smoke. Avoid cigarette smoke. Cigarette smoke is an irritant that can make symptoms worse.  Follow-up care  Follow up as advised by the healthcare provider or our staff. If you were referred to an allergy specialist, make this appointment promptly.  When to seek medical advice  Call your healthcare provider right away if the following occur:  · Coughing or wheezing  · Fever greater than 100.4°F (38°C)  · Hives (raised red bumps)  · Continuing symptoms, new symptoms, or worsening symptoms  Call 911 right away if you have:  · Trouble breathing  · Severe swelling of the face or severe itching of the eyes or mouth  Date Last Reviewed: 3/1/2017  © 0089-3016 [employer]. [address] [place] [address], [place] [address]. All rights reserved. This information is not intended as a substitute for professional medical care. Always follow your healthcare professional's instructions.

## 2020-11-17 NOTE — PATIENT INSTRUCTIONS

## 2021-01-04 ENCOUNTER — PATIENT MESSAGE (OUTPATIENT)
Dept: ADMINISTRATIVE | Facility: HOSPITAL | Age: 66
End: 2021-01-04

## 2021-02-10 ENCOUNTER — PATIENT MESSAGE (OUTPATIENT)
Dept: INTERNAL MEDICINE | Facility: CLINIC | Age: 66
End: 2021-02-10

## 2021-03-16 ENCOUNTER — OFFICE VISIT (OUTPATIENT)
Dept: URGENT CARE | Facility: CLINIC | Age: 66
End: 2021-03-16
Payer: MEDICARE

## 2021-03-16 VITALS
TEMPERATURE: 98 F | HEIGHT: 71 IN | DIASTOLIC BLOOD PRESSURE: 68 MMHG | WEIGHT: 195 LBS | SYSTOLIC BLOOD PRESSURE: 129 MMHG | RESPIRATION RATE: 18 BRPM | OXYGEN SATURATION: 97 % | BODY MASS INDEX: 27.3 KG/M2 | HEART RATE: 73 BPM

## 2021-03-16 DIAGNOSIS — R30.0 DYSURIA: Primary | ICD-10-CM

## 2021-03-16 DIAGNOSIS — N39.0 URINARY TRACT INFECTION WITH HEMATURIA, SITE UNSPECIFIED: ICD-10-CM

## 2021-03-16 DIAGNOSIS — R31.9 URINARY TRACT INFECTION WITH HEMATURIA, SITE UNSPECIFIED: ICD-10-CM

## 2021-03-16 LAB
BILIRUB UR QL STRIP: NEGATIVE
GLUCOSE UR QL STRIP: NEGATIVE
KETONES UR QL STRIP: NEGATIVE
LEUKOCYTE ESTERASE UR QL STRIP: POSITIVE
PH, POC UA: 5 (ref 5–8)
POC BLOOD, URINE: POSITIVE
POC NITRATES, URINE: NEGATIVE
PROT UR QL STRIP: NEGATIVE
SP GR UR STRIP: 1.01 (ref 1–1.03)
UROBILINOGEN UR STRIP-ACNC: NORMAL (ref 0.1–1.1)

## 2021-03-16 PROCEDURE — 99000 PR SPECIMEN HANDLING,DR OFF->LAB: ICD-10-PCS | Mod: S$GLB,,, | Performed by: FAMILY MEDICINE

## 2021-03-16 PROCEDURE — 3008F PR BODY MASS INDEX (BMI) DOCUMENTED: ICD-10-PCS | Mod: CPTII,S$GLB,, | Performed by: NURSE PRACTITIONER

## 2021-03-16 PROCEDURE — 99000 SPECIMEN HANDLING OFFICE-LAB: CPT | Mod: S$GLB,,, | Performed by: FAMILY MEDICINE

## 2021-03-16 PROCEDURE — 87086 URINE CULTURE/COLONY COUNT: CPT | Performed by: NURSE PRACTITIONER

## 2021-03-16 PROCEDURE — 87088 URINE BACTERIA CULTURE: CPT | Performed by: NURSE PRACTITIONER

## 2021-03-16 PROCEDURE — 99213 OFFICE O/P EST LOW 20 MIN: CPT | Mod: 25,S$GLB,, | Performed by: NURSE PRACTITIONER

## 2021-03-16 PROCEDURE — 81003 POCT URINALYSIS, DIPSTICK, AUTOMATED, W/O SCOPE: ICD-10-PCS | Mod: QW,S$GLB,, | Performed by: NURSE PRACTITIONER

## 2021-03-16 PROCEDURE — 87186 SC STD MICRODIL/AGAR DIL: CPT | Performed by: NURSE PRACTITIONER

## 2021-03-16 PROCEDURE — 81003 URINALYSIS AUTO W/O SCOPE: CPT | Mod: QW,S$GLB,, | Performed by: NURSE PRACTITIONER

## 2021-03-16 PROCEDURE — 99213 PR OFFICE/OUTPT VISIT, EST, LEVL III, 20-29 MIN: ICD-10-PCS | Mod: 25,S$GLB,, | Performed by: NURSE PRACTITIONER

## 2021-03-16 PROCEDURE — 3008F BODY MASS INDEX DOCD: CPT | Mod: CPTII,S$GLB,, | Performed by: NURSE PRACTITIONER

## 2021-03-16 PROCEDURE — 87077 CULTURE AEROBIC IDENTIFY: CPT | Performed by: NURSE PRACTITIONER

## 2021-03-16 RX ORDER — NITROFURANTOIN 25; 75 MG/1; MG/1
100 CAPSULE ORAL 2 TIMES DAILY
Qty: 10 CAPSULE | Refills: 0 | Status: SHIPPED | OUTPATIENT
Start: 2021-03-16 | End: 2021-03-21

## 2021-03-18 ENCOUNTER — TELEPHONE (OUTPATIENT)
Dept: URGENT CARE | Facility: CLINIC | Age: 66
End: 2021-03-18

## 2021-03-19 LAB — BACTERIA UR CULT: ABNORMAL

## 2021-03-22 DIAGNOSIS — N95.1 HOT FLUSHES, PERIMENOPAUSAL: ICD-10-CM

## 2021-03-22 RX ORDER — PROGESTERONE 200 MG/1
CAPSULE ORAL
Qty: 90 CAPSULE | Refills: 3 | Status: CANCELLED | OUTPATIENT
Start: 2021-03-22

## 2021-03-23 ENCOUNTER — TELEPHONE (OUTPATIENT)
Dept: OBSTETRICS AND GYNECOLOGY | Facility: CLINIC | Age: 66
End: 2021-03-23

## 2021-03-23 DIAGNOSIS — N95.1 HOT FLUSHES, PERIMENOPAUSAL: ICD-10-CM

## 2021-03-23 RX ORDER — PROGESTERONE 200 MG/1
CAPSULE ORAL
Qty: 90 CAPSULE | Refills: 3 | Status: CANCELLED | OUTPATIENT
Start: 2021-03-23

## 2021-03-25 ENCOUNTER — TELEPHONE (OUTPATIENT)
Dept: OBSTETRICS AND GYNECOLOGY | Facility: CLINIC | Age: 66
End: 2021-03-25

## 2021-03-25 DIAGNOSIS — G47.9 SLEEP DIFFICULTIES: ICD-10-CM

## 2021-03-25 DIAGNOSIS — N95.1 HOT FLUSHES, PERIMENOPAUSAL: ICD-10-CM

## 2021-03-25 RX ORDER — TRAZODONE HYDROCHLORIDE 50 MG/1
TABLET ORAL
Qty: 90 TABLET | Refills: 0 | Status: SHIPPED | OUTPATIENT
Start: 2021-03-25 | End: 2021-06-04 | Stop reason: SDUPTHER

## 2021-03-25 RX ORDER — PROGESTERONE 200 MG/1
CAPSULE ORAL
Qty: 90 CAPSULE | Refills: 3 | Status: CANCELLED | OUTPATIENT
Start: 2021-03-25

## 2021-04-16 ENCOUNTER — PATIENT MESSAGE (OUTPATIENT)
Dept: RESEARCH | Facility: HOSPITAL | Age: 66
End: 2021-04-16

## 2021-05-13 ENCOUNTER — TELEPHONE (OUTPATIENT)
Dept: INTERNAL MEDICINE | Facility: CLINIC | Age: 66
End: 2021-05-13

## 2021-05-13 ENCOUNTER — PATIENT OUTREACH (OUTPATIENT)
Dept: ADMINISTRATIVE | Facility: OTHER | Age: 66
End: 2021-05-13

## 2021-05-13 ENCOUNTER — PATIENT MESSAGE (OUTPATIENT)
Dept: ADMINISTRATIVE | Facility: OTHER | Age: 66
End: 2021-05-13

## 2021-05-13 ENCOUNTER — OFFICE VISIT (OUTPATIENT)
Dept: DERMATOLOGY | Facility: CLINIC | Age: 66
End: 2021-05-13
Payer: MEDICARE

## 2021-05-13 DIAGNOSIS — L57.0 ACTINIC KERATOSIS: ICD-10-CM

## 2021-05-13 DIAGNOSIS — D48.5 NEOPLASM OF UNCERTAIN BEHAVIOR OF SKIN: Primary | ICD-10-CM

## 2021-05-13 DIAGNOSIS — Z12.31 VISIT FOR SCREENING MAMMOGRAM: Primary | ICD-10-CM

## 2021-05-13 DIAGNOSIS — D22.70 MULTIPLE BENIGN MELANOCYTIC NEVI OF UPPER EXTREMITY, LOWER EXTREMITY, AND TRUNK: ICD-10-CM

## 2021-05-13 DIAGNOSIS — D22.5 MULTIPLE BENIGN MELANOCYTIC NEVI OF UPPER EXTREMITY, LOWER EXTREMITY, AND TRUNK: ICD-10-CM

## 2021-05-13 DIAGNOSIS — D22.30 MELANOCYTIC NEVI OF FACE: ICD-10-CM

## 2021-05-13 DIAGNOSIS — Z86.006 HISTORY OF MELANOMA IN SITU: ICD-10-CM

## 2021-05-13 DIAGNOSIS — D22.60 MULTIPLE BENIGN MELANOCYTIC NEVI OF UPPER EXTREMITY, LOWER EXTREMITY, AND TRUNK: ICD-10-CM

## 2021-05-13 DIAGNOSIS — L82.1 SEBORRHEIC KERATOSIS: ICD-10-CM

## 2021-05-13 DIAGNOSIS — L81.4 LENTIGINES: ICD-10-CM

## 2021-05-13 DIAGNOSIS — L90.5 SCAR: ICD-10-CM

## 2021-05-13 DIAGNOSIS — D48.5 NEOPLASM OF UNCERTAIN BEHAVIOR OF SKIN OF EAR: ICD-10-CM

## 2021-05-13 PROCEDURE — 99213 OFFICE O/P EST LOW 20 MIN: CPT | Mod: 25,S$GLB,, | Performed by: DERMATOLOGY

## 2021-05-13 PROCEDURE — 1126F AMNT PAIN NOTED NONE PRSNT: CPT | Mod: S$GLB,,, | Performed by: DERMATOLOGY

## 2021-05-13 PROCEDURE — 11301 PR SHAV SKIN LES 0.6-1.0 CM TRUNK,ARM,LEG: ICD-10-PCS | Mod: S$GLB,,, | Performed by: DERMATOLOGY

## 2021-05-13 PROCEDURE — 88305 TISSUE EXAM BY PATHOLOGIST: ICD-10-PCS | Mod: 26,,, | Performed by: DERMATOLOGY

## 2021-05-13 PROCEDURE — 88305 TISSUE EXAM BY PATHOLOGIST: CPT | Mod: 26,,, | Performed by: DERMATOLOGY

## 2021-05-13 PROCEDURE — 17003 DESTRUCT PREMALG LES 2-14: CPT | Mod: S$GLB,,, | Performed by: DERMATOLOGY

## 2021-05-13 PROCEDURE — 11311 SHAVE SKIN LESION 0.6-1.0 CM: CPT | Mod: S$GLB,,, | Performed by: DERMATOLOGY

## 2021-05-13 PROCEDURE — 3288F PR FALLS RISK ASSESSMENT DOCUMENTED: ICD-10-PCS | Mod: CPTII,S$GLB,, | Performed by: DERMATOLOGY

## 2021-05-13 PROCEDURE — 11300 SHAVE SKIN LESION 0.5 CM/<: CPT | Mod: S$GLB,,, | Performed by: DERMATOLOGY

## 2021-05-13 PROCEDURE — 1126F PR PAIN SEVERITY QUANTIFIED, NO PAIN PRESENT: ICD-10-PCS | Mod: S$GLB,,, | Performed by: DERMATOLOGY

## 2021-05-13 PROCEDURE — 11300 PR SHAV SKIN LES < 0.5 CM TRUNK,ARM,LEG: ICD-10-PCS | Mod: S$GLB,,, | Performed by: DERMATOLOGY

## 2021-05-13 PROCEDURE — 88342 IMHCHEM/IMCYTCHM 1ST ANTB: CPT | Performed by: DERMATOLOGY

## 2021-05-13 PROCEDURE — 88305 TISSUE EXAM BY PATHOLOGIST: CPT | Performed by: DERMATOLOGY

## 2021-05-13 PROCEDURE — 88342 IMHCHEM/IMCYTCHM 1ST ANTB: CPT | Mod: 26,,, | Performed by: DERMATOLOGY

## 2021-05-13 PROCEDURE — 3288F FALL RISK ASSESSMENT DOCD: CPT | Mod: CPTII,S$GLB,, | Performed by: DERMATOLOGY

## 2021-05-13 PROCEDURE — 1159F PR MEDICATION LIST DOCUMENTED IN MEDICAL RECORD: ICD-10-PCS | Mod: S$GLB,,, | Performed by: DERMATOLOGY

## 2021-05-13 PROCEDURE — 1101F PR PT FALLS ASSESS DOC 0-1 FALLS W/OUT INJ PAST YR: ICD-10-PCS | Mod: CPTII,S$GLB,, | Performed by: DERMATOLOGY

## 2021-05-13 PROCEDURE — 11301 SHAVE SKIN LESION 0.6-1.0 CM: CPT | Mod: S$GLB,,, | Performed by: DERMATOLOGY

## 2021-05-13 PROCEDURE — 11311 PR SHAV SKIN LES 0.6-1.0 CM FACE,FACIAL: ICD-10-PCS | Mod: S$GLB,,, | Performed by: DERMATOLOGY

## 2021-05-13 PROCEDURE — 99213 PR OFFICE/OUTPT VISIT, EST, LEVL III, 20-29 MIN: ICD-10-PCS | Mod: 25,S$GLB,, | Performed by: DERMATOLOGY

## 2021-05-13 PROCEDURE — 17000 PR DESTRUCTION(LASER SURGERY,CRYOSURGERY,CHEMOSURGERY),PREMALIGNANT LESIONS,FIRST LESION: ICD-10-PCS | Mod: 59,S$GLB,, | Performed by: DERMATOLOGY

## 2021-05-13 PROCEDURE — 88342 CHG IMMUNOCYTOCHEMISTRY: ICD-10-PCS | Mod: 26,,, | Performed by: DERMATOLOGY

## 2021-05-13 PROCEDURE — 1101F PT FALLS ASSESS-DOCD LE1/YR: CPT | Mod: CPTII,S$GLB,, | Performed by: DERMATOLOGY

## 2021-05-13 PROCEDURE — 1159F MED LIST DOCD IN RCRD: CPT | Mod: S$GLB,,, | Performed by: DERMATOLOGY

## 2021-05-13 PROCEDURE — 17000 DESTRUCT PREMALG LESION: CPT | Mod: 59,S$GLB,, | Performed by: DERMATOLOGY

## 2021-05-13 PROCEDURE — 17003 DESTRUCTION, PREMALIGNANT LESIONS; SECOND THROUGH 14 LESIONS: ICD-10-PCS | Mod: S$GLB,,, | Performed by: DERMATOLOGY

## 2021-05-18 ENCOUNTER — HOSPITAL ENCOUNTER (OUTPATIENT)
Dept: RADIOLOGY | Facility: OTHER | Age: 66
Discharge: HOME | End: 2021-05-18
Payer: MEDICARE

## 2021-05-18 DIAGNOSIS — Z12.31 VISIT FOR SCREENING MAMMOGRAM: ICD-10-CM

## 2021-05-18 DIAGNOSIS — R92.8 ABNORMAL MAMMOGRAM OF LEFT BREAST: Primary | ICD-10-CM

## 2021-05-18 PROCEDURE — 77063 BREAST TOMOSYNTHESIS BI: CPT | Mod: 26,,, | Performed by: RADIOLOGY

## 2021-05-18 PROCEDURE — 77067 SCR MAMMO BI INCL CAD: CPT | Mod: 26,,, | Performed by: RADIOLOGY

## 2021-05-18 PROCEDURE — 77067 MAMMO DIGITAL SCREENING BILAT WITH TOMO: ICD-10-PCS | Mod: 26,,, | Performed by: RADIOLOGY

## 2021-05-18 PROCEDURE — 77067 SCR MAMMO BI INCL CAD: CPT | Mod: TC

## 2021-05-18 PROCEDURE — 77063 MAMMO DIGITAL SCREENING BILAT WITH TOMO: ICD-10-PCS | Mod: 26,,, | Performed by: RADIOLOGY

## 2021-05-19 ENCOUNTER — TELEPHONE (OUTPATIENT)
Dept: RADIOLOGY | Facility: OTHER | Age: 66
End: 2021-05-19

## 2021-05-19 ENCOUNTER — TELEPHONE (OUTPATIENT)
Dept: INTERNAL MEDICINE | Facility: CLINIC | Age: 66
End: 2021-05-19

## 2021-05-19 ENCOUNTER — PATIENT MESSAGE (OUTPATIENT)
Dept: INTERNAL MEDICINE | Facility: CLINIC | Age: 66
End: 2021-05-19

## 2021-05-20 ENCOUNTER — HOSPITAL ENCOUNTER (OUTPATIENT)
Dept: RADIOLOGY | Facility: OTHER | Age: 66
Discharge: HOME | End: 2021-05-20
Attending: INTERNAL MEDICINE
Payer: MEDICARE

## 2021-05-20 DIAGNOSIS — R92.8 ABNORMAL MAMMOGRAM OF LEFT BREAST: ICD-10-CM

## 2021-05-20 LAB
FINAL PATHOLOGIC DIAGNOSIS: NORMAL
GROSS: NORMAL
Lab: NORMAL
MICROSCOPIC EXAM: NORMAL

## 2021-05-20 PROCEDURE — 77061 BREAST TOMOSYNTHESIS UNI: CPT | Mod: 26,LT,, | Performed by: RADIOLOGY

## 2021-05-20 PROCEDURE — 77065 MAMMO DIGITAL DIAGNOSTIC LEFT WITH TOMO: ICD-10-PCS | Mod: 26,LT,, | Performed by: RADIOLOGY

## 2021-05-20 PROCEDURE — 77061 BREAST TOMOSYNTHESIS UNI: CPT | Mod: TC,LT

## 2021-05-20 PROCEDURE — 77061 MAMMO DIGITAL DIAGNOSTIC LEFT WITH TOMO: ICD-10-PCS | Mod: 26,LT,, | Performed by: RADIOLOGY

## 2021-05-20 PROCEDURE — 77065 DX MAMMO INCL CAD UNI: CPT | Mod: 26,LT,, | Performed by: RADIOLOGY

## 2021-05-21 ENCOUNTER — LAB VISIT (OUTPATIENT)
Dept: LAB | Facility: HOSPITAL | Age: 66
End: 2021-05-21
Attending: INTERNAL MEDICINE
Payer: MEDICARE

## 2021-05-21 DIAGNOSIS — E03.9 ACQUIRED HYPOTHYROIDISM: ICD-10-CM

## 2021-05-21 DIAGNOSIS — Z13.6 ENCOUNTER FOR LIPID SCREENING FOR CARDIOVASCULAR DISEASE: ICD-10-CM

## 2021-05-21 DIAGNOSIS — N18.30 STAGE 3 CHRONIC KIDNEY DISEASE: ICD-10-CM

## 2021-05-21 DIAGNOSIS — Z13.220 ENCOUNTER FOR LIPID SCREENING FOR CARDIOVASCULAR DISEASE: ICD-10-CM

## 2021-05-21 DIAGNOSIS — Z00.00 ANNUAL PHYSICAL EXAM: ICD-10-CM

## 2021-05-21 DIAGNOSIS — N95.1 HOT FLASHES DUE TO MENOPAUSE: ICD-10-CM

## 2021-05-21 LAB
25(OH)D3+25(OH)D2 SERPL-MCNC: 24 NG/ML (ref 30–96)
ALBUMIN SERPL BCP-MCNC: 3.7 G/DL (ref 3.5–5.2)
ALP SERPL-CCNC: 70 U/L (ref 55–135)
ALT SERPL W/O P-5'-P-CCNC: 17 U/L (ref 10–44)
ANION GAP SERPL CALC-SCNC: 8 MMOL/L (ref 8–16)
AST SERPL-CCNC: 16 U/L (ref 10–40)
BILIRUB SERPL-MCNC: 0.6 MG/DL (ref 0.1–1)
BUN SERPL-MCNC: 14 MG/DL (ref 8–23)
CALCIUM SERPL-MCNC: 9.7 MG/DL (ref 8.7–10.5)
CHLORIDE SERPL-SCNC: 108 MMOL/L (ref 95–110)
CHOLEST SERPL-MCNC: 203 MG/DL (ref 120–199)
CHOLEST/HDLC SERPL: 3.7 {RATIO} (ref 2–5)
CO2 SERPL-SCNC: 26 MMOL/L (ref 23–29)
CREAT SERPL-MCNC: 1 MG/DL (ref 0.5–1.4)
ERYTHROCYTE [DISTWIDTH] IN BLOOD BY AUTOMATED COUNT: 12.7 % (ref 11.5–14.5)
EST. GFR  (AFRICAN AMERICAN): >60 ML/MIN/1.73 M^2
EST. GFR  (NON AFRICAN AMERICAN): 58.4 ML/MIN/1.73 M^2
GLUCOSE SERPL-MCNC: 84 MG/DL (ref 70–110)
HCT VFR BLD AUTO: 45.3 % (ref 37–48.5)
HDLC SERPL-MCNC: 55 MG/DL (ref 40–75)
HDLC SERPL: 27.1 % (ref 20–50)
HGB BLD-MCNC: 14.6 G/DL (ref 12–16)
LDLC SERPL CALC-MCNC: 119 MG/DL (ref 63–159)
MCH RBC QN AUTO: 31 PG (ref 27–31)
MCHC RBC AUTO-ENTMCNC: 32.2 G/DL (ref 32–36)
MCV RBC AUTO: 96 FL (ref 82–98)
NONHDLC SERPL-MCNC: 148 MG/DL
PLATELET # BLD AUTO: 295 K/UL (ref 150–450)
PMV BLD AUTO: 9.2 FL (ref 9.2–12.9)
POTASSIUM SERPL-SCNC: 4.7 MMOL/L (ref 3.5–5.1)
PROT SERPL-MCNC: 6.8 G/DL (ref 6–8.4)
RBC # BLD AUTO: 4.71 M/UL (ref 4–5.4)
SODIUM SERPL-SCNC: 142 MMOL/L (ref 136–145)
TRIGL SERPL-MCNC: 145 MG/DL (ref 30–150)
TSH SERPL DL<=0.005 MIU/L-ACNC: 0.63 UIU/ML (ref 0.4–4)
WBC # BLD AUTO: 6.68 K/UL (ref 3.9–12.7)

## 2021-05-21 PROCEDURE — 80053 COMPREHEN METABOLIC PANEL: CPT | Performed by: INTERNAL MEDICINE

## 2021-05-21 PROCEDURE — 80061 LIPID PANEL: CPT | Performed by: INTERNAL MEDICINE

## 2021-05-21 PROCEDURE — 36415 COLL VENOUS BLD VENIPUNCTURE: CPT | Performed by: INTERNAL MEDICINE

## 2021-05-21 PROCEDURE — 84443 ASSAY THYROID STIM HORMONE: CPT | Performed by: INTERNAL MEDICINE

## 2021-05-21 PROCEDURE — 85027 COMPLETE CBC AUTOMATED: CPT | Performed by: INTERNAL MEDICINE

## 2021-05-21 PROCEDURE — 82306 VITAMIN D 25 HYDROXY: CPT | Performed by: INTERNAL MEDICINE

## 2021-06-04 ENCOUNTER — OFFICE VISIT (OUTPATIENT)
Dept: INTERNAL MEDICINE | Facility: CLINIC | Age: 66
End: 2021-06-04
Payer: MEDICARE

## 2021-06-04 VITALS
HEIGHT: 71 IN | DIASTOLIC BLOOD PRESSURE: 61 MMHG | WEIGHT: 197.56 LBS | BODY MASS INDEX: 27.66 KG/M2 | SYSTOLIC BLOOD PRESSURE: 126 MMHG | HEART RATE: 72 BPM

## 2021-06-04 DIAGNOSIS — E03.9 ACQUIRED HYPOTHYROIDISM: ICD-10-CM

## 2021-06-04 DIAGNOSIS — N95.1 HOT FLASHES DUE TO MENOPAUSE: ICD-10-CM

## 2021-06-04 DIAGNOSIS — M54.50 CHRONIC BILATERAL LOW BACK PAIN WITHOUT SCIATICA: ICD-10-CM

## 2021-06-04 DIAGNOSIS — M25.552 CHRONIC LEFT HIP PAIN: ICD-10-CM

## 2021-06-04 DIAGNOSIS — Z85.820 HISTORY OF MELANOMA: ICD-10-CM

## 2021-06-04 DIAGNOSIS — G89.29 CHRONIC BILATERAL LOW BACK PAIN WITHOUT SCIATICA: ICD-10-CM

## 2021-06-04 DIAGNOSIS — Z00.00 ANNUAL PHYSICAL EXAM: Primary | ICD-10-CM

## 2021-06-04 DIAGNOSIS — G89.29 CHRONIC LEFT HIP PAIN: ICD-10-CM

## 2021-06-04 DIAGNOSIS — R79.89 LOW VITAMIN D LEVEL: ICD-10-CM

## 2021-06-04 DIAGNOSIS — N18.31 STAGE 3A CHRONIC KIDNEY DISEASE: ICD-10-CM

## 2021-06-04 DIAGNOSIS — J30.89 ENVIRONMENTAL AND SEASONAL ALLERGIES: ICD-10-CM

## 2021-06-04 DIAGNOSIS — G47.9 SLEEP DIFFICULTIES: ICD-10-CM

## 2021-06-04 PROCEDURE — 1101F PR PT FALLS ASSESS DOC 0-1 FALLS W/OUT INJ PAST YR: ICD-10-PCS | Mod: CPTII,S$GLB,, | Performed by: INTERNAL MEDICINE

## 2021-06-04 PROCEDURE — 3008F BODY MASS INDEX DOCD: CPT | Mod: CPTII,S$GLB,, | Performed by: INTERNAL MEDICINE

## 2021-06-04 PROCEDURE — 99499 UNLISTED E&M SERVICE: CPT | Mod: S$GLB,,, | Performed by: INTERNAL MEDICINE

## 2021-06-04 PROCEDURE — 3008F PR BODY MASS INDEX (BMI) DOCUMENTED: ICD-10-PCS | Mod: CPTII,S$GLB,, | Performed by: INTERNAL MEDICINE

## 2021-06-04 PROCEDURE — 99999 PR PBB SHADOW E&M-EST. PATIENT-LVL IV: CPT | Mod: PBBFAC,,, | Performed by: INTERNAL MEDICINE

## 2021-06-04 PROCEDURE — 99999 PR PBB SHADOW E&M-EST. PATIENT-LVL IV: ICD-10-PCS | Mod: PBBFAC,,, | Performed by: INTERNAL MEDICINE

## 2021-06-04 PROCEDURE — 99214 PR OFFICE/OUTPT VISIT, EST, LEVL IV, 30-39 MIN: ICD-10-PCS | Mod: S$GLB,,, | Performed by: INTERNAL MEDICINE

## 2021-06-04 PROCEDURE — 99499 RISK ADDL DX/OHS AUDIT: ICD-10-PCS | Mod: S$GLB,,, | Performed by: INTERNAL MEDICINE

## 2021-06-04 PROCEDURE — 3288F FALL RISK ASSESSMENT DOCD: CPT | Mod: CPTII,S$GLB,, | Performed by: INTERNAL MEDICINE

## 2021-06-04 PROCEDURE — 1126F AMNT PAIN NOTED NONE PRSNT: CPT | Mod: S$GLB,,, | Performed by: INTERNAL MEDICINE

## 2021-06-04 PROCEDURE — 99214 OFFICE O/P EST MOD 30 MIN: CPT | Mod: S$GLB,,, | Performed by: INTERNAL MEDICINE

## 2021-06-04 PROCEDURE — 1101F PT FALLS ASSESS-DOCD LE1/YR: CPT | Mod: CPTII,S$GLB,, | Performed by: INTERNAL MEDICINE

## 2021-06-04 PROCEDURE — 3288F PR FALLS RISK ASSESSMENT DOCUMENTED: ICD-10-PCS | Mod: CPTII,S$GLB,, | Performed by: INTERNAL MEDICINE

## 2021-06-04 PROCEDURE — 1126F PR PAIN SEVERITY QUANTIFIED, NO PAIN PRESENT: ICD-10-PCS | Mod: S$GLB,,, | Performed by: INTERNAL MEDICINE

## 2021-06-04 RX ORDER — TRAZODONE HYDROCHLORIDE 50 MG/1
TABLET ORAL
Qty: 180 TABLET | Refills: 1 | Status: SHIPPED | OUTPATIENT
Start: 2021-06-04 | End: 2022-04-20 | Stop reason: SDUPTHER

## 2021-06-04 RX ORDER — LEVOTHYROXINE SODIUM 88 UG/1
88 TABLET ORAL
Qty: 90 TABLET | Refills: 3 | Status: SHIPPED | OUTPATIENT
Start: 2021-06-04 | End: 2022-04-20 | Stop reason: SDUPTHER

## 2021-06-06 PROBLEM — R79.89 LOW VITAMIN D LEVEL: Status: ACTIVE | Noted: 2021-06-06

## 2021-06-07 ENCOUNTER — TELEPHONE (OUTPATIENT)
Dept: SPORTS MEDICINE | Facility: CLINIC | Age: 66
End: 2021-06-07

## 2021-06-07 ENCOUNTER — OFFICE VISIT (OUTPATIENT)
Dept: OBSTETRICS AND GYNECOLOGY | Facility: CLINIC | Age: 66
End: 2021-06-07
Payer: MEDICARE

## 2021-06-07 VITALS
WEIGHT: 195.13 LBS | DIASTOLIC BLOOD PRESSURE: 66 MMHG | BODY MASS INDEX: 27.32 KG/M2 | HEIGHT: 71 IN | SYSTOLIC BLOOD PRESSURE: 110 MMHG

## 2021-06-07 DIAGNOSIS — N39.41 URGE URINARY INCONTINENCE: ICD-10-CM

## 2021-06-07 DIAGNOSIS — N95.2 VAGINAL ATROPHY: ICD-10-CM

## 2021-06-07 DIAGNOSIS — N95.1 MENOPAUSAL HOT FLUSHES: ICD-10-CM

## 2021-06-07 DIAGNOSIS — Z01.419 WELL WOMAN EXAM WITH ROUTINE GYNECOLOGICAL EXAM: Primary | ICD-10-CM

## 2021-06-07 PROCEDURE — 3288F FALL RISK ASSESSMENT DOCD: CPT | Mod: CPTII,S$GLB,, | Performed by: OBSTETRICS & GYNECOLOGY

## 2021-06-07 PROCEDURE — G0101 CA SCREEN;PELVIC/BREAST EXAM: HCPCS | Mod: S$GLB,,, | Performed by: OBSTETRICS & GYNECOLOGY

## 2021-06-07 PROCEDURE — 99999 PR PBB SHADOW E&M-EST. PATIENT-LVL III: ICD-10-PCS | Mod: PBBFAC,,, | Performed by: OBSTETRICS & GYNECOLOGY

## 2021-06-07 PROCEDURE — 3008F PR BODY MASS INDEX (BMI) DOCUMENTED: ICD-10-PCS | Mod: CPTII,S$GLB,, | Performed by: OBSTETRICS & GYNECOLOGY

## 2021-06-07 PROCEDURE — 87086 URINE CULTURE/COLONY COUNT: CPT | Performed by: OBSTETRICS & GYNECOLOGY

## 2021-06-07 PROCEDURE — 3008F BODY MASS INDEX DOCD: CPT | Mod: CPTII,S$GLB,, | Performed by: OBSTETRICS & GYNECOLOGY

## 2021-06-07 PROCEDURE — 99999 PR PBB SHADOW E&M-EST. PATIENT-LVL III: CPT | Mod: PBBFAC,,, | Performed by: OBSTETRICS & GYNECOLOGY

## 2021-06-07 PROCEDURE — 3288F PR FALLS RISK ASSESSMENT DOCUMENTED: ICD-10-PCS | Mod: CPTII,S$GLB,, | Performed by: OBSTETRICS & GYNECOLOGY

## 2021-06-07 PROCEDURE — 1126F AMNT PAIN NOTED NONE PRSNT: CPT | Mod: S$GLB,,, | Performed by: OBSTETRICS & GYNECOLOGY

## 2021-06-07 PROCEDURE — 1101F PT FALLS ASSESS-DOCD LE1/YR: CPT | Mod: CPTII,S$GLB,, | Performed by: OBSTETRICS & GYNECOLOGY

## 2021-06-07 PROCEDURE — 1101F PR PT FALLS ASSESS DOC 0-1 FALLS W/OUT INJ PAST YR: ICD-10-PCS | Mod: CPTII,S$GLB,, | Performed by: OBSTETRICS & GYNECOLOGY

## 2021-06-07 PROCEDURE — G0101 PR CA SCREEN;PELVIC/BREAST EXAM: ICD-10-PCS | Mod: S$GLB,,, | Performed by: OBSTETRICS & GYNECOLOGY

## 2021-06-07 PROCEDURE — 1126F PR PAIN SEVERITY QUANTIFIED, NO PAIN PRESENT: ICD-10-PCS | Mod: S$GLB,,, | Performed by: OBSTETRICS & GYNECOLOGY

## 2021-06-07 RX ORDER — ESTRADIOL AND LEVONORGESTREL .045; .015 MG/D; MG/D
1 PATCH TRANSDERMAL WEEKLY
Qty: 4 PATCH | Refills: 11 | Status: SHIPPED | OUTPATIENT
Start: 2021-06-07 | End: 2022-04-15

## 2021-06-08 ENCOUNTER — HOSPITAL ENCOUNTER (OUTPATIENT)
Dept: RADIOLOGY | Facility: HOSPITAL | Age: 66
Discharge: HOME | End: 2021-06-08
Attending: NEUROMUSCULOSKELETAL MEDICINE & OMM
Payer: MEDICARE

## 2021-06-08 ENCOUNTER — OFFICE VISIT (OUTPATIENT)
Dept: SPORTS MEDICINE | Facility: CLINIC | Age: 66
End: 2021-06-08
Payer: MEDICARE

## 2021-06-08 VITALS
WEIGHT: 195 LBS | HEART RATE: 63 BPM | BODY MASS INDEX: 27.3 KG/M2 | HEIGHT: 71 IN | DIASTOLIC BLOOD PRESSURE: 75 MMHG | SYSTOLIC BLOOD PRESSURE: 116 MMHG

## 2021-06-08 DIAGNOSIS — M54.59 MECHANICAL LOW BACK PAIN: ICD-10-CM

## 2021-06-08 DIAGNOSIS — M76.31 IT BAND SYNDROME, RIGHT: ICD-10-CM

## 2021-06-08 DIAGNOSIS — G89.29 CHRONIC LEFT HIP PAIN: ICD-10-CM

## 2021-06-08 DIAGNOSIS — M99.03 SOMATIC DYSFUNCTION OF LUMBAR REGION: ICD-10-CM

## 2021-06-08 DIAGNOSIS — M70.61 GREATER TROCHANTERIC BURSITIS OF RIGHT HIP: ICD-10-CM

## 2021-06-08 DIAGNOSIS — M25.552 CHRONIC LEFT HIP PAIN: ICD-10-CM

## 2021-06-08 DIAGNOSIS — M54.50 CHRONIC BILATERAL LOW BACK PAIN WITHOUT SCIATICA: ICD-10-CM

## 2021-06-08 DIAGNOSIS — M25.551 LATERAL PAIN OF RIGHT HIP: ICD-10-CM

## 2021-06-08 DIAGNOSIS — M99.02 SOMATIC DYSFUNCTION OF THORACIC REGION: ICD-10-CM

## 2021-06-08 DIAGNOSIS — M25.552 LATERAL PAIN OF LEFT HIP: Primary | ICD-10-CM

## 2021-06-08 DIAGNOSIS — M99.05 SOMATIC DYSFUNCTION OF PELVIC REGION: ICD-10-CM

## 2021-06-08 DIAGNOSIS — G89.29 CHRONIC BILATERAL LOW BACK PAIN WITHOUT SCIATICA: ICD-10-CM

## 2021-06-08 DIAGNOSIS — M99.06 SOMATIC DYSFUNCTION OF LOWER EXTREMITY: ICD-10-CM

## 2021-06-08 DIAGNOSIS — M79.10 MYALGIA: ICD-10-CM

## 2021-06-08 DIAGNOSIS — M99.04 SACRAL REGION SOMATIC DYSFUNCTION: ICD-10-CM

## 2021-06-08 LAB — BACTERIA UR CULT: NO GROWTH

## 2021-06-08 PROCEDURE — 73502 X-RAY EXAM HIP UNI 2-3 VIEWS: CPT | Mod: 26,LT,, | Performed by: RADIOLOGY

## 2021-06-08 PROCEDURE — 97110 PR THERAPEUTIC EXERCISES: ICD-10-PCS | Mod: GP,S$GLB,, | Performed by: NEUROMUSCULOSKELETAL MEDICINE & OMM

## 2021-06-08 PROCEDURE — 98927 OSTEOPATH MANJ 5-6 REGIONS: CPT | Mod: S$GLB,,, | Performed by: NEUROMUSCULOSKELETAL MEDICINE & OMM

## 2021-06-08 PROCEDURE — 3008F BODY MASS INDEX DOCD: CPT | Mod: CPTII,S$GLB,, | Performed by: NEUROMUSCULOSKELETAL MEDICINE & OMM

## 2021-06-08 PROCEDURE — 73502 X-RAY EXAM HIP UNI 2-3 VIEWS: CPT | Mod: TC,LT

## 2021-06-08 PROCEDURE — 1125F AMNT PAIN NOTED PAIN PRSNT: CPT | Mod: S$GLB,,, | Performed by: NEUROMUSCULOSKELETAL MEDICINE & OMM

## 2021-06-08 PROCEDURE — 99999 PR PBB SHADOW E&M-EST. PATIENT-LVL III: CPT | Mod: PBBFAC,,, | Performed by: NEUROMUSCULOSKELETAL MEDICINE & OMM

## 2021-06-08 PROCEDURE — 1159F PR MEDICATION LIST DOCUMENTED IN MEDICAL RECORD: ICD-10-PCS | Mod: S$GLB,,, | Performed by: NEUROMUSCULOSKELETAL MEDICINE & OMM

## 2021-06-08 PROCEDURE — 97110 THERAPEUTIC EXERCISES: CPT | Mod: GP,S$GLB,, | Performed by: NEUROMUSCULOSKELETAL MEDICINE & OMM

## 2021-06-08 PROCEDURE — 1159F MED LIST DOCD IN RCRD: CPT | Mod: S$GLB,,, | Performed by: NEUROMUSCULOSKELETAL MEDICINE & OMM

## 2021-06-08 PROCEDURE — 3008F PR BODY MASS INDEX (BMI) DOCUMENTED: ICD-10-PCS | Mod: CPTII,S$GLB,, | Performed by: NEUROMUSCULOSKELETAL MEDICINE & OMM

## 2021-06-08 PROCEDURE — 1125F PR PAIN SEVERITY QUANTIFIED, PAIN PRESENT: ICD-10-PCS | Mod: S$GLB,,, | Performed by: NEUROMUSCULOSKELETAL MEDICINE & OMM

## 2021-06-08 PROCEDURE — 99214 OFFICE O/P EST MOD 30 MIN: CPT | Mod: 25,S$GLB,, | Performed by: NEUROMUSCULOSKELETAL MEDICINE & OMM

## 2021-06-08 PROCEDURE — 98927 PR OSTEOPATHIC MANIP,5-6 BODY REGN: ICD-10-PCS | Mod: S$GLB,,, | Performed by: NEUROMUSCULOSKELETAL MEDICINE & OMM

## 2021-06-08 PROCEDURE — 99214 PR OFFICE/OUTPT VISIT, EST, LEVL IV, 30-39 MIN: ICD-10-PCS | Mod: 25,S$GLB,, | Performed by: NEUROMUSCULOSKELETAL MEDICINE & OMM

## 2021-06-08 PROCEDURE — 99999 PR PBB SHADOW E&M-EST. PATIENT-LVL III: ICD-10-PCS | Mod: PBBFAC,,, | Performed by: NEUROMUSCULOSKELETAL MEDICINE & OMM

## 2021-06-08 PROCEDURE — 73502 XR HIP 2 VIEW LEFT: ICD-10-PCS | Mod: 26,LT,, | Performed by: RADIOLOGY

## 2021-06-14 ENCOUNTER — TELEPHONE (OUTPATIENT)
Dept: DERMATOLOGY | Facility: CLINIC | Age: 66
End: 2021-06-14

## 2021-06-28 ENCOUNTER — OFFICE VISIT (OUTPATIENT)
Dept: SPORTS MEDICINE | Facility: CLINIC | Age: 66
End: 2021-06-28
Payer: MEDICARE

## 2021-06-28 VITALS
HEIGHT: 71 IN | DIASTOLIC BLOOD PRESSURE: 70 MMHG | BODY MASS INDEX: 27.3 KG/M2 | SYSTOLIC BLOOD PRESSURE: 100 MMHG | WEIGHT: 195 LBS

## 2021-06-28 DIAGNOSIS — M99.08 SOMATIC DYSFUNCTION OF RIB CAGE REGION: ICD-10-CM

## 2021-06-28 DIAGNOSIS — M76.31 IT BAND SYNDROME, RIGHT: ICD-10-CM

## 2021-06-28 DIAGNOSIS — M25.552 LATERAL PAIN OF LEFT HIP: Primary | ICD-10-CM

## 2021-06-28 DIAGNOSIS — M70.61 GREATER TROCHANTERIC BURSITIS OF BOTH HIPS: ICD-10-CM

## 2021-06-28 DIAGNOSIS — M99.05 SOMATIC DYSFUNCTION OF PELVIC REGION: ICD-10-CM

## 2021-06-28 DIAGNOSIS — M99.04 SACRAL REGION SOMATIC DYSFUNCTION: ICD-10-CM

## 2021-06-28 DIAGNOSIS — M99.03 SOMATIC DYSFUNCTION OF LUMBAR REGION: ICD-10-CM

## 2021-06-28 DIAGNOSIS — M99.02 SOMATIC DYSFUNCTION OF THORACIC REGION: ICD-10-CM

## 2021-06-28 DIAGNOSIS — M70.62 GREATER TROCHANTERIC BURSITIS OF BOTH HIPS: ICD-10-CM

## 2021-06-28 DIAGNOSIS — M25.551 LATERAL PAIN OF RIGHT HIP: ICD-10-CM

## 2021-06-28 DIAGNOSIS — M79.10 MYALGIA: ICD-10-CM

## 2021-06-28 PROCEDURE — 99999 PR PBB SHADOW E&M-EST. PATIENT-LVL II: CPT | Mod: PBBFAC,,, | Performed by: NEUROMUSCULOSKELETAL MEDICINE & OMM

## 2021-06-28 PROCEDURE — 1101F PR PT FALLS ASSESS DOC 0-1 FALLS W/OUT INJ PAST YR: ICD-10-PCS | Mod: CPTII,S$GLB,, | Performed by: NEUROMUSCULOSKELETAL MEDICINE & OMM

## 2021-06-28 PROCEDURE — 3008F PR BODY MASS INDEX (BMI) DOCUMENTED: ICD-10-PCS | Mod: CPTII,S$GLB,, | Performed by: NEUROMUSCULOSKELETAL MEDICINE & OMM

## 2021-06-28 PROCEDURE — 98927 PR OSTEOPATHIC MANIP,5-6 BODY REGN: ICD-10-PCS | Mod: S$GLB,,, | Performed by: NEUROMUSCULOSKELETAL MEDICINE & OMM

## 2021-06-28 PROCEDURE — 1101F PT FALLS ASSESS-DOCD LE1/YR: CPT | Mod: CPTII,S$GLB,, | Performed by: NEUROMUSCULOSKELETAL MEDICINE & OMM

## 2021-06-28 PROCEDURE — 98927 OSTEOPATH MANJ 5-6 REGIONS: CPT | Mod: S$GLB,,, | Performed by: NEUROMUSCULOSKELETAL MEDICINE & OMM

## 2021-06-28 PROCEDURE — 3288F PR FALLS RISK ASSESSMENT DOCUMENTED: ICD-10-PCS | Mod: CPTII,S$GLB,, | Performed by: NEUROMUSCULOSKELETAL MEDICINE & OMM

## 2021-06-28 PROCEDURE — 1159F MED LIST DOCD IN RCRD: CPT | Mod: S$GLB,,, | Performed by: NEUROMUSCULOSKELETAL MEDICINE & OMM

## 2021-06-28 PROCEDURE — 3288F FALL RISK ASSESSMENT DOCD: CPT | Mod: CPTII,S$GLB,, | Performed by: NEUROMUSCULOSKELETAL MEDICINE & OMM

## 2021-06-28 PROCEDURE — 97110 PR THERAPEUTIC EXERCISES: ICD-10-PCS | Mod: GP,S$GLB,, | Performed by: NEUROMUSCULOSKELETAL MEDICINE & OMM

## 2021-06-28 PROCEDURE — 3008F BODY MASS INDEX DOCD: CPT | Mod: CPTII,S$GLB,, | Performed by: NEUROMUSCULOSKELETAL MEDICINE & OMM

## 2021-06-28 PROCEDURE — 99214 OFFICE O/P EST MOD 30 MIN: CPT | Mod: 25,S$GLB,, | Performed by: NEUROMUSCULOSKELETAL MEDICINE & OMM

## 2021-06-28 PROCEDURE — 1159F PR MEDICATION LIST DOCUMENTED IN MEDICAL RECORD: ICD-10-PCS | Mod: S$GLB,,, | Performed by: NEUROMUSCULOSKELETAL MEDICINE & OMM

## 2021-06-28 PROCEDURE — 97110 THERAPEUTIC EXERCISES: CPT | Mod: GP,S$GLB,, | Performed by: NEUROMUSCULOSKELETAL MEDICINE & OMM

## 2021-06-28 PROCEDURE — 99999 PR PBB SHADOW E&M-EST. PATIENT-LVL II: ICD-10-PCS | Mod: PBBFAC,,, | Performed by: NEUROMUSCULOSKELETAL MEDICINE & OMM

## 2021-06-28 PROCEDURE — 99214 PR OFFICE/OUTPT VISIT, EST, LEVL IV, 30-39 MIN: ICD-10-PCS | Mod: 25,S$GLB,, | Performed by: NEUROMUSCULOSKELETAL MEDICINE & OMM

## 2021-09-02 ENCOUNTER — PATIENT MESSAGE (OUTPATIENT)
Dept: SPORTS MEDICINE | Facility: CLINIC | Age: 66
End: 2021-09-02

## 2021-10-12 ENCOUNTER — IMMUNIZATION (OUTPATIENT)
Dept: INTERNAL MEDICINE | Facility: CLINIC | Age: 66
End: 2021-10-12
Payer: MEDICARE

## 2021-10-12 DIAGNOSIS — Z23 NEED FOR VACCINATION: Primary | ICD-10-CM

## 2021-10-12 PROCEDURE — 91300 COVID-19, MRNA, LNP-S, PF, 30 MCG/0.3 ML DOSE VACCINE: CPT | Mod: PBBFAC | Performed by: INTERNAL MEDICINE

## 2021-10-12 PROCEDURE — 0003A COVID-19, MRNA, LNP-S, PF, 30 MCG/0.3 ML DOSE VACCINE: CPT | Mod: CV19,PBBFAC | Performed by: INTERNAL MEDICINE

## 2021-11-03 ENCOUNTER — PES CALL (OUTPATIENT)
Dept: ADMINISTRATIVE | Facility: CLINIC | Age: 66
End: 2021-11-03
Payer: MEDICARE

## 2021-11-24 ENCOUNTER — OFFICE VISIT (OUTPATIENT)
Dept: INTERNAL MEDICINE | Facility: CLINIC | Age: 66
End: 2021-11-24
Payer: MEDICARE

## 2021-11-24 VITALS
WEIGHT: 202 LBS | SYSTOLIC BLOOD PRESSURE: 114 MMHG | OXYGEN SATURATION: 97 % | BODY MASS INDEX: 28.28 KG/M2 | HEIGHT: 71 IN | HEART RATE: 85 BPM | DIASTOLIC BLOOD PRESSURE: 62 MMHG

## 2021-11-24 DIAGNOSIS — M25.552 BILATERAL HIP PAIN: ICD-10-CM

## 2021-11-24 DIAGNOSIS — Z13.6 ENCOUNTER FOR LIPID SCREENING FOR CARDIOVASCULAR DISEASE: ICD-10-CM

## 2021-11-24 DIAGNOSIS — Z23 NEED FOR PNEUMOCOCCAL VACCINATION: ICD-10-CM

## 2021-11-24 DIAGNOSIS — R79.9 ABNORMAL BLOOD CHEMISTRY: ICD-10-CM

## 2021-11-24 DIAGNOSIS — M25.551 BILATERAL HIP PAIN: ICD-10-CM

## 2021-11-24 DIAGNOSIS — N18.31 STAGE 3A CHRONIC KIDNEY DISEASE: ICD-10-CM

## 2021-11-24 DIAGNOSIS — E03.9 ACQUIRED HYPOTHYROIDISM: ICD-10-CM

## 2021-11-24 DIAGNOSIS — E55.9 VITAMIN D DEFICIENCY: ICD-10-CM

## 2021-11-24 DIAGNOSIS — Z13.220 ENCOUNTER FOR LIPID SCREENING FOR CARDIOVASCULAR DISEASE: ICD-10-CM

## 2021-11-24 DIAGNOSIS — Z23 NEED FOR VACCINATION: ICD-10-CM

## 2021-11-24 DIAGNOSIS — G47.9 SLEEP DIFFICULTIES: Primary | ICD-10-CM

## 2021-11-24 DIAGNOSIS — N39.41 URGE INCONTINENCE: ICD-10-CM

## 2021-11-24 PROCEDURE — 99499 UNLISTED E&M SERVICE: CPT | Mod: S$GLB,,, | Performed by: INTERNAL MEDICINE

## 2021-11-24 PROCEDURE — 99214 OFFICE O/P EST MOD 30 MIN: CPT | Mod: S$GLB,,, | Performed by: INTERNAL MEDICINE

## 2021-11-24 PROCEDURE — 99999 PR PBB SHADOW E&M-EST. PATIENT-LVL IV: ICD-10-PCS | Mod: PBBFAC,,, | Performed by: INTERNAL MEDICINE

## 2021-11-24 PROCEDURE — 99499 RISK ADDL DX/OHS AUDIT: ICD-10-PCS | Mod: S$GLB,,, | Performed by: INTERNAL MEDICINE

## 2021-11-24 PROCEDURE — G0009 ADMIN PNEUMOCOCCAL VACCINE: HCPCS | Mod: S$GLB,,, | Performed by: INTERNAL MEDICINE

## 2021-11-24 PROCEDURE — 99999 PR PBB SHADOW E&M-EST. PATIENT-LVL IV: CPT | Mod: PBBFAC,,, | Performed by: INTERNAL MEDICINE

## 2021-11-24 PROCEDURE — 99214 PR OFFICE/OUTPT VISIT, EST, LEVL IV, 30-39 MIN: ICD-10-PCS | Mod: S$GLB,,, | Performed by: INTERNAL MEDICINE

## 2021-11-24 PROCEDURE — G0009 PNEUMOCOCCAL POLYSACCHARIDE VACCINE 23-VALENT =>2YO SQ IM: ICD-10-PCS | Mod: S$GLB,,, | Performed by: INTERNAL MEDICINE

## 2021-11-24 PROCEDURE — 90732 PPSV23 VACC 2 YRS+ SUBQ/IM: CPT | Mod: S$GLB,,, | Performed by: INTERNAL MEDICINE

## 2021-11-24 PROCEDURE — 90732 PNEUMOCOCCAL POLYSACCHARIDE VACCINE 23-VALENT =>2YO SQ IM: ICD-10-PCS | Mod: S$GLB,,, | Performed by: INTERNAL MEDICINE

## 2021-11-24 RX ORDER — DICLOFENAC SODIUM 30 MG/G
GEL TOPICAL 2 TIMES DAILY
Qty: 200 G | Refills: 1 | Status: SHIPPED | OUTPATIENT
Start: 2021-11-24 | End: 2022-04-20

## 2021-12-02 ENCOUNTER — PES CALL (OUTPATIENT)
Dept: ADMINISTRATIVE | Facility: CLINIC | Age: 66
End: 2021-12-02
Payer: MEDICARE

## 2021-12-06 ENCOUNTER — PATIENT OUTREACH (OUTPATIENT)
Dept: ADMINISTRATIVE | Facility: OTHER | Age: 66
End: 2021-12-06
Payer: MEDICARE

## 2021-12-06 ENCOUNTER — PATIENT MESSAGE (OUTPATIENT)
Dept: DERMATOLOGY | Facility: CLINIC | Age: 66
End: 2021-12-06
Payer: MEDICARE

## 2021-12-07 ENCOUNTER — OFFICE VISIT (OUTPATIENT)
Dept: DERMATOLOGY | Facility: CLINIC | Age: 66
End: 2021-12-07
Payer: MEDICARE

## 2021-12-07 DIAGNOSIS — L82.1 SEBORRHEIC KERATOSIS: ICD-10-CM

## 2021-12-07 DIAGNOSIS — D22.70 MULTIPLE BENIGN MELANOCYTIC NEVI OF UPPER EXTREMITY, LOWER EXTREMITY, AND TRUNK: ICD-10-CM

## 2021-12-07 DIAGNOSIS — D48.5 NEOPLASM OF UNCERTAIN BEHAVIOR OF SKIN: Primary | ICD-10-CM

## 2021-12-07 DIAGNOSIS — Z86.006 HISTORY OF MELANOMA IN SITU: ICD-10-CM

## 2021-12-07 DIAGNOSIS — L81.4 LENTIGINES: ICD-10-CM

## 2021-12-07 DIAGNOSIS — Z12.83 SCREENING EXAM FOR SKIN CANCER: ICD-10-CM

## 2021-12-07 DIAGNOSIS — D22.30 MELANOCYTIC NEVI OF FACE: ICD-10-CM

## 2021-12-07 DIAGNOSIS — D18.01 ANGIOMA OF SKIN: ICD-10-CM

## 2021-12-07 DIAGNOSIS — D22.60 MULTIPLE BENIGN MELANOCYTIC NEVI OF UPPER EXTREMITY, LOWER EXTREMITY, AND TRUNK: ICD-10-CM

## 2021-12-07 DIAGNOSIS — D22.5 MULTIPLE BENIGN MELANOCYTIC NEVI OF UPPER EXTREMITY, LOWER EXTREMITY, AND TRUNK: ICD-10-CM

## 2021-12-07 PROCEDURE — 88305 TISSUE EXAM BY PATHOLOGIST: CPT | Mod: 26,,, | Performed by: PATHOLOGY

## 2021-12-07 PROCEDURE — 11300 SHAVE SKIN LESION 0.5 CM/<: CPT | Mod: S$GLB,,, | Performed by: DERMATOLOGY

## 2021-12-07 PROCEDURE — 88305 TISSUE EXAM BY PATHOLOGIST: CPT | Performed by: PATHOLOGY

## 2021-12-07 PROCEDURE — 99213 OFFICE O/P EST LOW 20 MIN: CPT | Mod: 25,S$GLB,, | Performed by: DERMATOLOGY

## 2021-12-07 PROCEDURE — 99213 PR OFFICE/OUTPT VISIT, EST, LEVL III, 20-29 MIN: ICD-10-PCS | Mod: 25,S$GLB,, | Performed by: DERMATOLOGY

## 2021-12-07 PROCEDURE — 11300 PR SHAV SKIN LES < 0.5 CM TRUNK,ARM,LEG: ICD-10-PCS | Mod: S$GLB,,, | Performed by: DERMATOLOGY

## 2021-12-07 PROCEDURE — 88305 TISSUE EXAM BY PATHOLOGIST: ICD-10-PCS | Mod: 26,,, | Performed by: PATHOLOGY

## 2021-12-16 LAB
FINAL PATHOLOGIC DIAGNOSIS: NORMAL
GROSS: NORMAL
Lab: NORMAL
MICROSCOPIC EXAM: NORMAL

## 2021-12-29 ENCOUNTER — PES CALL (OUTPATIENT)
Dept: ADMINISTRATIVE | Facility: CLINIC | Age: 66
End: 2021-12-29
Payer: MEDICARE

## 2022-01-11 ENCOUNTER — CLINICAL SUPPORT (OUTPATIENT)
Dept: REHABILITATION | Facility: OTHER | Age: 67
End: 2022-01-11
Payer: MEDICARE

## 2022-01-11 DIAGNOSIS — G89.29 CHRONIC LEFT-SIDED LOW BACK PAIN WITHOUT SCIATICA: ICD-10-CM

## 2022-01-11 DIAGNOSIS — M25.551 BILATERAL HIP PAIN: ICD-10-CM

## 2022-01-11 DIAGNOSIS — M54.50 CHRONIC LEFT-SIDED LOW BACK PAIN WITHOUT SCIATICA: ICD-10-CM

## 2022-01-11 DIAGNOSIS — R29.898 WEAKNESS OF LEFT HIP: ICD-10-CM

## 2022-01-11 DIAGNOSIS — M25.552 BILATERAL HIP PAIN: ICD-10-CM

## 2022-01-11 DIAGNOSIS — R68.89 DECREASED ACTIVITY TOLERANCE: ICD-10-CM

## 2022-01-11 PROBLEM — M25.511 RIGHT SHOULDER PAIN: Status: RESOLVED | Noted: 2019-10-04 | Resolved: 2022-01-11

## 2022-01-11 PROBLEM — M54.2 NECK PAIN: Status: RESOLVED | Noted: 2019-10-04 | Resolved: 2022-01-11

## 2022-01-11 PROBLEM — M25.611 DECREASED RANGE OF MOTION OF RIGHT SHOULDER: Status: RESOLVED | Noted: 2019-10-04 | Resolved: 2022-01-11

## 2022-01-11 PROCEDURE — 97110 THERAPEUTIC EXERCISES: CPT | Mod: PN | Performed by: PHYSICAL THERAPIST

## 2022-01-11 PROCEDURE — 97161 PT EVAL LOW COMPLEX 20 MIN: CPT | Mod: PN | Performed by: PHYSICAL THERAPIST

## 2022-01-11 NOTE — PLAN OF CARE
[facility]   Physical Therapy Initial Evaluation     Date: 1/11/2022   Name: Sara David  Clinic Number: [id number]    Therapy Diagnosis:   Encounter Diagnoses   Name Primary?    Bilateral hip pain     Weakness of left hip     Decreased activity tolerance     Chronic left-sided low back pain without sciatica      Physician: Lynda Wheat MD    Physician Orders: PT Eval and Treat   Medical Diagnosis from Referral: M25.551,M25.552 (ICD-10-CM) - Bilateral hip pain  Evaluation Date: 1/11/2022  Authorization Period Expiration: 1/25/2022  Plan of Care Expiration: 4/4/2022  Progress Note Due: 2/8/2022  Visit # / Visits authorized: 1/ 1   FOTO: 1/5    Precautions: Standard     Time In: 10:15  Time Out: 11:30am  Total Appointment Time (timed & untimed codes): 45 minutes      SUBJECTIVE   Date of onset: 6/2021    History of current condition - Elise reports: L sided low back and B hip pain L>R. She has been doing yoga daily and aquatic exercise. No real improvement with yoga, rolling her IT band, or tylenol. She was taking advil prior with good relief but unable to take long term. Her pain is worse with standing too long, first thing in morning, and at night. Hx of bursitis and worse with lying on side. Denies any saddle anesthesia, N/T in LE's, B/B dysfunction.     Falls: none    Imaging, see chart    Prior Therapy: yes  Social History:   Prior Level of Function: independent with ADL's; riding her bike   Current Level of Function: indep with ADL's yoga, pool exercise, walking her dog    Pain:  Current 3/10, worst 5/10, best 0/10   Location: L side lower back and anterior/ lateral hip   Description: aching   Aggravating Factors: sudden jerky movements, prolonged standing, walking long distances   Easing Factors: stretching, advil, short walks/ changing positions     Patients goals: To be more active      Medical History:   Past Medical History:   Diagnosis Date    Colon  polyp 2008    Disorder of kidney and ureter     Endometriosis     History of melanoma in situ 4/17/2018    Left arm, s/p excision by Alexandre Gomez MD    Hypothyroidism     dx in her 40s, enlarged thyroid, did have FNA of thyroid nodule which was benign    Melanoma 1990, 2018    1990s - on back; 2018 - L upper arm    Miscarriage        Surgical History:   Sara David  has a past surgical history that includes Back surgery; [place] tooth extraction; Dilation and curettage of uterus; Salpingoophorectomy; FNA thyroid; and Malignant skin lesion excision.    Medications:   Sara has a current medication list which includes the following prescription(s): cetirizine, vitamin d3, diclofenac sodium, climara pro, levothyroxine, and trazodone.    Allergies:   Review of patient's allergies indicates:   Allergen Reactions    Iodinated contrast media           OBJECTIVE     Observation: ambulating indep without AD; increased pronation and hip IR through mid stance    Posture:  R anteriorly rotated innominate noted    Lumbar Range of Motion:    percentage Pain   Flexion 100%   -        Extension 50%   -; nonuniform spinal curve hinged at lumbar        Left Side Bending 50% Painful L LSP        Right Side Bending 75% -        Left rotation   50% -        Right Rotation   50% -           Hip AROM:    ER: L: 30, R: 30 deg  IR: L: 40 , R: 35 deg    Lower Extremity Strength    MMT    Left  Right    Hip:  Flexion    4/5   5/5  Extension   4/5   4-/5  Abduction   4-/5   4/5  Adduction   5/5   5/5  External Rotation  4/5   5/5  Internal rotation  5/5   5/5    Knee:  Flexion    5/5   5/5  Extension   5/5   5/5      Special Tests:  -Repeated Flexion: no change  -Repeated Ext: no change  -Quadrant test: positive L  -FADIR: negative  -SLS: < 10 sec B    -SI compression/ distraction: positive  -sacral thrust: negative  -ROXANNE: positive L  -Supine to sit: positive for L posterior rotated innominate    Neuro Dynamic Testing:     Sciatic nerve:      SLR: R = negative     L = negative     Joint Mobility: painful to PA overpressure L3-5, hypomobility mid to lower thoracic noted    Palpation: TTP L TFL, Gluteus minimus , PSIS, greater trochanter, QL     Sensation: BLE LT grossly intact    Flexibility:    Ely's test: negative B   Popliteal Angle: R = 0 degrees ; L = 0 degrees   Radha's test: (+) B       Limitation/Restriction for FOTO hip Survey    Therapist reviewed FOTO scores for Sara David on 1/11/2022.   FOTO documents entered into [employer] - see Media section.    Limitation Score: 38%         TREATMENT     Total Treatment time (time-based codes) separate from Evaluation: 10 minutes      Elise received the treatments listed below:      therapeutic exercises to develop strength and core stabilization for 10 minutes including:  Transverse abdominus activation and instruction  S/L clams x 10 (cuing for level pelvis and can modify against wall)  pec stretch over towel roll (can use foam roll at home)  MET for L posteriorly rotated innominate x 3      PATIENT EDUCATION AND HOME EXERCISES     Education provided:   - HEP, role of PT, POC    Written Home Exercises Provided: yes. Exercises were reviewed and Elise was able to demonstrate them prior to the end of the session.  Elise demonstrated good  understanding of the education provided. See EMR under Patient Instructions for exercises provided during therapy sessions.    ASSESSMENT     Sara is a 66 y.o. female referred to outpatient Physical Therapy with a medical diagnosis of B hip pain L>R. Patient presents with hip weakness, poor motor control and load acceptance lumbopelvic region, decreased LE flexibility, and pain limited performance of ADL's. Pain provoked with facet loading and SI provocation tests.     Patient prognosis is Good.   Patientt will benefit from skilled outpatient Physical Therapy to address the deficits stated above and in the chart below, provide patient /family  education, and to maximize patientt's level of independence.     Plan of care discussed with patient: Yes  Patient's spiritual, cultural and educational needs considered and patient is agreeable to the plan of care and goals as stated below:     Anticipated Barriers for therapy: none    Medical Necessity is demonstrated by the following  History  Co-morbidities and personal factors that may impact the plan of care Co-morbidities:   none    Personal Factors:   no deficits     low   Examination  Body Structures and Functions, activity limitations and participation restrictions that may impact the plan of care Body Regions:   back  lower extremities  trunk    Body Systems:    ROM  strength  balance  gait  transfers  transitions  motor control    Participation Restrictions:   Walking, standing    Activity limitations:   Learning and applying knowledge  no deficits    General Tasks and Commands  no deficits    Communication  no deficits    Mobility  walking    Self care  no deficits    Domestic Life  no deficits    Interactions/Relationships  no deficits    Life Areas  no deficits    Community and Social Life  community life  recreation and leisure         high   Clinical Presentation evolving clinical presentation with changing clinical characteristics moderate   Decision Making/ Complexity Score: low     Goals:  Short Term Goals: 6 weeks   1. Patient to report decreased pain in hips at night by 30% or greater  2. Patient to be able to ambulate 1 mile or greater with little to no difficulty  3. Patient to improve thoracolumbar extension to % WNL for ease with recreational activities and yoga    Long Term Goals: 12 weeks   1. Patient to be independent with home exercise program for improved self management of condition  2. Patient to have decreased subjective report of disability as noted by a score of <30% on the FOTO questionnaire   3. Patient to have improved balance as noted by SLS 10 sec or greater for  decreased risk for falls      PLAN   Plan of care Certification: 1/11/2022 to 4/4/2022.    Outpatient Physical Therapy 2 times weekly for 12 weeks to include the following interventions: Gait Training, Manual Therapy, Moist Heat/ Ice, Neuromuscular Re-ed, Patient Education, Therapeutic Activities, Therapeutic Exercise and dry needling prn.     Mercedes Neri, PT

## 2022-01-17 ENCOUNTER — PATIENT MESSAGE (OUTPATIENT)
Dept: REHABILITATION | Facility: OTHER | Age: 67
End: 2022-01-17
Payer: MEDICARE

## 2022-01-17 NOTE — TELEPHONE ENCOUNTER
----- Message from Meche Wynn MD sent at 3/7/2019  2:17 PM CST -----  Can you let this patient know that I responded to her patient portal message but saw she hasnt read it?   Physical Therapy     Referred by: Mildred Maki DO; Medical Diagnosis (from order):    Diagnosis Information      Diagnosis    719.41, 338.29 (ICD-9-CM) - M25.512, G89.29 (ICD-10-CM) - Chronic left shoulder pain    840.4 (ICD-9-CM) - S46.012A (ICD-10-CM) - Strain of left rotator cuff capsule, initial encounter    726.10 (ICD-9-CM) - M75.52 (ICD-10-CM) - Bursitis of left shoulder                Daily Treatment Note    Visit:  5     SUBJECTIVE                                                                                                               Patient reports: getting better, band exercises went okay at home.     OBJECTIVE                                                                                                                        TREATMENT                                                                                                                  Therapeutic Exercise:  Home exercise program education, work station ergonomics education   Left shoulder passive range of motion to tolerance  Pulleys flexion x 10, scaption x 10 - defer   Cane flexion x 10  Cane External rotation seated x 15   ·Seated Bilateral Shoulder Flexion Towel Slide at Table Top -2 sets - 10 reps - 3-5 seconds hold - defer *    Precautions: DM      Manual Therapy:  Soft tissue mobilization to left shoulder complex: biceps, pecs, upper trap, scapular borders  Scapula mobilizations: inferior/medial grade 2    Neuromuscular Re-Education:  Neuromuscular Re-education to improve posture, improve proprioception, improve coordination and improve kinesthetic sense:  Posture education  ·Seated Scapular Retraction  10 reps - 3-5 seconds hold * - tactile cueing - defer   Wall slides into flexion x 10 tactile cueing * defer  Rows green theraband x 15 *  Shoulder extensions orange theraband x 15 *  Bilateral External rotation  orange theraband x 10  Low trap orange theraband x 10       Skilled input: verbal instruction/cues, posture correction,  facilitation, tactile instruction/cues and inhibition    Writer verbally educated and received verbal consent for hand placement, positioning of patient, and techniques to be performed today from patient for hand placement and palpation for techniques as described above and how they are pertinent to the patient's plan of care.    Home Exercise Program/Education Materials: *above indicates provided as part of home exercise program  Access Code: [email or web address]     ASSESSMENT                                                                                                             Patient presents with decreasing soft tissue restrictions grossly along upper extremity complex allowing for decreased pain reports. She continues to exhibit muscle guarding with passive range of motion affecting gains. She reports a stretch and slight discomfort with end-range cane exercises today. Patient tolerated addition of scap stabilization well with cues for scap recruitment.   Patient Education:   Results of above outlined education: Verbalizes understanding and Demonstrates understanding      PLAN                                                                                                                           Suggestions for next session as indicated: Progress per plan of care         Therapy procedure time and total treatment time can be found documented on the Time Entry flowsheet

## 2022-01-24 ENCOUNTER — CLINICAL SUPPORT (OUTPATIENT)
Dept: REHABILITATION | Facility: OTHER | Age: 67
End: 2022-01-24
Payer: MEDICARE

## 2022-01-24 DIAGNOSIS — R68.89 DECREASED ACTIVITY TOLERANCE: ICD-10-CM

## 2022-01-24 DIAGNOSIS — R29.898 WEAKNESS OF LEFT HIP: ICD-10-CM

## 2022-01-24 DIAGNOSIS — M54.50 CHRONIC LEFT-SIDED LOW BACK PAIN WITHOUT SCIATICA: ICD-10-CM

## 2022-01-24 DIAGNOSIS — G89.29 CHRONIC LEFT-SIDED LOW BACK PAIN WITHOUT SCIATICA: ICD-10-CM

## 2022-01-24 PROCEDURE — 97110 THERAPEUTIC EXERCISES: CPT | Mod: PN | Performed by: PHYSICAL THERAPIST

## 2022-01-24 PROCEDURE — 97140 MANUAL THERAPY 1/> REGIONS: CPT | Mod: PN | Performed by: PHYSICAL THERAPIST

## 2022-01-24 NOTE — PROGRESS NOTES
"[facility]   Physical Therapy Treatment Note     Name: Sara David  Clinic Number: [id number]    Therapy Diagnosis:   Encounter Diagnoses   Name Primary?    Weakness of left hip     Decreased activity tolerance     Chronic left-sided low back pain without sciatica      Physician: Lynda Wheat MD    Visit Date: 1/24/2022    Physician Orders: PT Eval and Treat   Medical Diagnosis from Referral: M25.551,M25.552 (ICD-10-CM) - Bilateral hip pain  Evaluation Date: 1/11/2022  Authorization Period Expiration: 1/25/2022  Plan of Care Expiration: 4/4/2022  Progress Note Due: 2/8/2022  Visit # / Visits authorized: 1/ 1   FOTO: 1/5     Precautions: Standard     PTA Visit #: 0/5     Time In: 1:45 pm  Time Out: 2:30 pm  Total Billable Time: 45 minutes    SUBJECTIVE     Pt reports: the bridging hurting in her home program. Pain is more on the way down. Interested in dry needling  She was compliant with home exercise program.  Response to previous treatment: pain with bridging   Functional change: none    Pain: 5/10  Location: left lateral hip      OBJECTIVE     Objective Measures updated at progress report unless specified.     Treatment     Elise received the treatments listed below:      therapeutic exercises to develop strength, flexibility, posture and core stabilization for 30 minutes including:    Transverse abdominus activation with heel lift 1 min alt  S/L clams x 20 GTB  pec stretch over towel roll - continue in HEP  MET for L posteriorly rotated innominate x 3  iso hip abd pilats ring 3" x 20  PPT with ball x 20  Prone hip ext over pillow x 20    manual therapy techniques: Joint mobilizations, Manual traction and Myofacial release were applied to the: L hip and low bacl for 15 minutes, including:  LAD 30" x 3 L  STM/ MFR to piriformis and gluteals  Rolling stick IT band  Passive QL and IT band stretch    Patient Education and Home Exercises     Home Exercises Provided and " Patient Education Provided     Education provided:   - educated on PPT and prone hip ext in place of bridging     Written Home Exercises Provided: yes. Exercises were reviewed and Elise was able to demonstrate them prior to the end of the session.  Elise demonstrated good  understanding of the education provided. See EMR under Patient Instructions for exercises provided during therapy sessions    ASSESSMENT     Good tolerance to treatment session with reduction in symptoms following long axis distraction and manual stretches. May benefit from FDN to piriformis, gluteus medius/ minimus, and TFl in future visits for pain modulation    Elise Is progressing well towards her goals.   Pt prognosis is Good.     Pt will continue to benefit from skilled outpatient physical therapy to address the deficits listed in the problem list box on initial evaluation, provide pt/family education and to maximize pt's level of independence in the home and community environment.     Pt's spiritual, cultural and educational needs considered and pt agreeable to plan of care and goals.     Anticipated barriers to physical therapy: none    Goals:     Short Term Goals: 6 weeks   1. Patient to report decreased pain in hips at night by 30% or greater (in progress, not met)  2. Patient to be able to ambulate 1 mile or greater with little to no difficulty (in progress, not met)  3. Patient to improve thoracolumbar extension to % WNL for ease with recreational activities and yoga (in progress, not met)     Long Term Goals: 12 weeks   1. Patient to be independent with home exercise program for improved self management of condition (in progress, not met)  2. Patient to have decreased subjective report of disability as noted by a score of <30% on the FOTO questionnaire (in progress, not met)  3. Patient to have improved balance as noted by SLS 10 sec or greater for decreased risk for falls (in progress, not met)    PLAN     Continue to progress  gluteal strengthening per tolerance. Manual therapy and other modalities prn for pain modulation     Mercedes Neri, PT

## 2022-02-04 ENCOUNTER — PES CALL (OUTPATIENT)
Dept: ADMINISTRATIVE | Facility: CLINIC | Age: 67
End: 2022-02-04
Payer: MEDICARE

## 2022-02-08 ENCOUNTER — CLINICAL SUPPORT (OUTPATIENT)
Dept: REHABILITATION | Facility: OTHER | Age: 67
End: 2022-02-08
Payer: MEDICARE

## 2022-02-08 DIAGNOSIS — R68.89 DECREASED ACTIVITY TOLERANCE: ICD-10-CM

## 2022-02-08 DIAGNOSIS — R29.898 WEAKNESS OF LEFT HIP: ICD-10-CM

## 2022-02-08 DIAGNOSIS — M54.50 CHRONIC LEFT-SIDED LOW BACK PAIN WITHOUT SCIATICA: ICD-10-CM

## 2022-02-08 DIAGNOSIS — G89.29 CHRONIC LEFT-SIDED LOW BACK PAIN WITHOUT SCIATICA: ICD-10-CM

## 2022-02-08 PROCEDURE — 97140 MANUAL THERAPY 1/> REGIONS: CPT | Mod: PN | Performed by: PHYSICAL THERAPIST

## 2022-02-08 PROCEDURE — 97110 THERAPEUTIC EXERCISES: CPT | Mod: PN | Performed by: PHYSICAL THERAPIST

## 2022-02-08 NOTE — PROGRESS NOTES
"[facility]   Physical Therapy Treatment Note     Name: Sara David  Clinic Number: [id number]    Therapy Diagnosis:   Encounter Diagnoses   Name Primary?    Weakness of left hip     Decreased activity tolerance     Chronic left-sided low back pain without sciatica      Physician: Lynda Wheat MD    Visit Date: 2/8/2022    Physician Orders: PT Eval and Treat   Medical Diagnosis from Referral: M25.551,M25.552 (ICD-10-CM) - Bilateral hip pain  Evaluation Date: 1/11/2022  Authorization Period Expiration: 1/25/2022  Plan of Care Expiration: 4/4/2022  Progress Note Due: 2/8/2022  Visit # / Visits authorized: 1/ 1   FOTO: 1/5     Precautions: Standard     PTA Visit #: 0/5     Time In: 10:00 am  Time Out: 10:50 am  Total Billable Time: 40 minutes    SUBJECTIVE     Pt reports: Doing the exercises daily and prior to therapy this morning. She feels the exercises are helping and need to get stronger. Interested in dry needling this visit.   She was compliant with home exercise program.  Response to previous treatment: pain with bridging   Functional change: none    Pain: 5/10  Location: left lateral hip      OBJECTIVE     Objective Measures updated at progress report unless specified.     Treatment     Elise received the treatments listed below:      therapeutic exercises to develop strength, flexibility, posture and core stabilization for 25 minutes including:    PPT with ball and BLPD OTB x 20  iso hip abd pilats ring 3" x 20  Push pull for L posterior rotation x 3  LTR with PB x 3 min  Sit to stands with pilates ring for bracing (airex pad or height) 2 x 10    continue in HEP:  Transverse abdominus activation with heel lift 1 min alt  S/L clams x 20 GTB  pec stretch over towel roll -   Prone hip ext over pillow x 20    manual therapy techniques: Joint mobilizations, Manual traction and Myofacial release were applied to the: L hip and low bacl for 15 minutes, including:  LAD 30" x 3 " L  STM posterior lateral hip    Application of FDN: Pt educated on benefits and potential side effects of dry needling. Educated pt on benefits, precautions, side effects followign IDN. Educated pt to use heat following treatment sessions if pt is experiencing pain or soreness. Pt verbalized good understanding of education.  Pt signed written consent to dry needling Rx. Pt gave verbal consent for DN    Pt received dry needling to the below listed muscles using 60 mm, 75mm needles.  Piriformis (2 points)- mild bleeding with pressure applied   Tensor fascia latae L  Gluteus medius/ minimus L    Moist heat x 10 minute concurrently with exercise      Patient Education and Home Exercises     Home Exercises Provided and Patient Education Provided     Education provided:   - issued sit to stands and assisted bridging     Written Home Exercises Provided: yes. Exercises were reviewed and Elise was able to demonstrate them prior to the end of the session.  Elise demonstrated good  understanding of the education provided. See EMR under Patient Instructions for exercises provided during therapy sessions    ASSESSMENT     Tolerated treatment well. Initiated dry needling for L hip and SI joint pain. Increased soreness following dry needling which ceased following gentle mat exercises and moist heat. Will continue to monitor response and progress per tolerance.     Elise Is progressing well towards her goals.   Pt prognosis is Good.     Pt will continue to benefit from skilled outpatient physical therapy to address the deficits listed in the problem list box on initial evaluation, provide pt/family education and to maximize pt's level of independence in the home and community environment.     Pt's spiritual, cultural and educational needs considered and pt agreeable to plan of care and goals.     Anticipated barriers to physical therapy: none    Goals:     Short Term Goals: 6 weeks   1. Patient to report decreased pain in hips at  night by 30% or greater (in progress, not met)  2. Patient to be able to ambulate 1 mile or greater with little to no difficulty (in progress, not met)  3. Patient to improve thoracolumbar extension to % WNL for ease with recreational activities and yoga (in progress, not met)     Long Term Goals: 12 weeks   1. Patient to be independent with home exercise program for improved self management of condition (in progress, not met)  2. Patient to have decreased subjective report of disability as noted by a score of <30% on the FOTO questionnaire (in progress, not met)  3. Patient to have improved balance as noted by SLS 10 sec or greater for decreased risk for falls (in progress, not met)    PLAN     Continue to progress gluteal strengthening per tolerance. Manual therapy and other modalities prn for pain modulation     Mercedes Neri, PT

## 2022-02-15 ENCOUNTER — CLINICAL SUPPORT (OUTPATIENT)
Dept: REHABILITATION | Facility: OTHER | Age: 67
End: 2022-02-15
Payer: MEDICARE

## 2022-02-15 DIAGNOSIS — M54.50 CHRONIC LEFT-SIDED LOW BACK PAIN WITHOUT SCIATICA: ICD-10-CM

## 2022-02-15 DIAGNOSIS — R68.89 DECREASED ACTIVITY TOLERANCE: ICD-10-CM

## 2022-02-15 DIAGNOSIS — G89.29 CHRONIC LEFT-SIDED LOW BACK PAIN WITHOUT SCIATICA: ICD-10-CM

## 2022-02-15 DIAGNOSIS — R29.898 WEAKNESS OF LEFT HIP: ICD-10-CM

## 2022-02-15 PROCEDURE — 97140 MANUAL THERAPY 1/> REGIONS: CPT | Mod: PN | Performed by: PHYSICAL THERAPIST

## 2022-02-15 PROCEDURE — 97110 THERAPEUTIC EXERCISES: CPT | Mod: PN | Performed by: PHYSICAL THERAPIST

## 2022-02-15 NOTE — PROGRESS NOTES
"[facility]   Physical Therapy Treatment Note     Name: Sara aDvid  Clinic Number: [id number]    Therapy Diagnosis:   Encounter Diagnoses   Name Primary?    Weakness of left hip     Decreased activity tolerance     Chronic left-sided low back pain without sciatica      Physician: Lynda Wheat MD    Visit Date: 2/15/2022    Physician Orders: PT Eval and Treat   Medical Diagnosis from Referral: M25.551,M25.552 (ICD-10-CM) - Bilateral hip pain  Evaluation Date: 1/11/2022  Authorization Period Expiration: 1/25/2022  Plan of Care Expiration: 4/4/2022  Progress Note Due: 2/8/2022  Visit # / Visits authorized: 3/18  FOTO: 1/5     Precautions: Standard     PTA Visit #: 0/5     Time In: 10:00 am  Time Out: 10:50 am  Total Billable Time: 40 minutes    SUBJECTIVE     Pt reports: Having more pain this week. Did not see ay benefit to the dry needling. Reports pain with the prone hip extension exercise. Also increased the number of days she does aquatic exercise and started sit ups / rock backs for her abs.    She was compliant with home exercise program.  Response to previous treatment: pain with bridging   Functional change: none    Pain: 5/10  Location: left lateral hip      OBJECTIVE     Objective Measures updated at progress report unless specified.     Treatment     Elise received the treatments listed below:      therapeutic exercises to develop strength, flexibility, posture and core stabilization for 30 minutes including:    PPT with ball and BLPD OTB x 20  iso hip abd pilats ring 3" x 20  LTR with PB x 3 min  Sit to stands with pilates ring for bracing (airex pad or height) 2 x 10  Prone hip ext over pillow x 20- modified to leaning over EOB  +iso abdominals with PB x 20  S/L clams x 20 GTB  +dying bug x 10 ea (pilates ring variation)     continue in HEP:  Transverse abdominus activation with heel lift 1 min alt  pec stretch over towel roll -     manual therapy techniques: " "Joint mobilizations, Manual traction and Myofacial release were applied to the: L hip and low bacl for 15 minutes, including:  LAD 30" x 3 L  STM posterior lateral hip  MET for L anterior rotation x 3    Moist heat x 10 minute concurrently with exercise      Patient Education and Home Exercises     Home Exercises Provided and Patient Education Provided     Education provided:   -continued HEP per tolerance and d/c prone hip extension if continue to be painful. Can decreased frequency to days not performing aquatic exercise     Written Home Exercises Provided: yes. Exercises were reviewed and Elise was able to demonstrate them prior to the end of the session.  Elise demonstrated good  understanding of the education provided. See EMR under Patient Instructions for exercises provided during therapy sessions    ASSESSMENT     Sara demonstrating improved tolerance to bridging without exacerbation of pain this session. Pt limited in hip extension AROM and decreased flexibility of iliopsoas. Recommended hip extensions over pillows or flexed over edge of bed for improved pain with HEP. Deferred dry needling with no benefit seen and continued with long leg distraction with relief reported.     Elise Is progressing well towards her goals.   Pt prognosis is Good.     Pt will continue to benefit from skilled outpatient physical therapy to address the deficits listed in the problem list box on initial evaluation, provide pt/family education and to maximize pt's level of independence in the home and community environment.     Pt's spiritual, cultural and educational needs considered and pt agreeable to plan of care and goals.     Anticipated barriers to physical therapy: none    Goals:     Short Term Goals: 6 weeks   1. Patient to report decreased pain in hips at night by 30% or greater (in progress, not met)  2. Patient to be able to ambulate 1 mile or greater with little to no difficulty (in progress, not met)  3. Patient to " improve thoracolumbar extension to % WNL for ease with recreational activities and yoga (in progress, not met)     Long Term Goals: 12 weeks   1. Patient to be independent with home exercise program for improved self management of condition (in progress, not met)  2. Patient to have decreased subjective report of disability as noted by a score of <30% on the FOTO questionnaire (in progress, not met)  3. Patient to have improved balance as noted by SLS 10 sec or greater for decreased risk for falls (in progress, not met)    PLAN     Continue to progress gluteal strengthening per tolerance. Manual therapy and other modalities prn for pain modulation     Mercedes Neri, PT

## 2022-02-21 ENCOUNTER — CLINICAL SUPPORT (OUTPATIENT)
Dept: REHABILITATION | Facility: OTHER | Age: 67
End: 2022-02-21
Payer: MEDICARE

## 2022-02-21 DIAGNOSIS — G89.29 CHRONIC LEFT-SIDED LOW BACK PAIN WITHOUT SCIATICA: ICD-10-CM

## 2022-02-21 DIAGNOSIS — M54.50 CHRONIC LEFT-SIDED LOW BACK PAIN WITHOUT SCIATICA: ICD-10-CM

## 2022-02-21 DIAGNOSIS — R68.89 DECREASED ACTIVITY TOLERANCE: ICD-10-CM

## 2022-02-21 DIAGNOSIS — R29.898 WEAKNESS OF LEFT HIP: Primary | ICD-10-CM

## 2022-02-21 PROCEDURE — 97140 MANUAL THERAPY 1/> REGIONS: CPT | Mod: PN | Performed by: PHYSICAL THERAPIST

## 2022-02-21 PROCEDURE — 97110 THERAPEUTIC EXERCISES: CPT | Mod: PN | Performed by: PHYSICAL THERAPIST

## 2022-02-21 NOTE — PROGRESS NOTES
[facility]   Physical Therapy Treatment Note     Name: Sara David  Clinic Number: [id number]    Therapy Diagnosis:   Encounter Diagnoses   Name Primary?    Weakness of left hip Yes    Decreased activity tolerance     Chronic left-sided low back pain without sciatica      Physician: Lynda Wheat MD    Visit Date: 2/21/2022    Physician Orders: PT Eval and Treat   Medical Diagnosis from Referral: M25.551,M25.552 (ICD-10-CM) - Bilateral hip pain  Evaluation Date: 1/11/2022  Authorization Period Expiration: 1/25/2022  Plan of Care Expiration: 4/4/2022  Progress Note Due: 2/8/2022  Visit # / Visits authorized: 3/18  FOTO: 1/5     Precautions: Standard     PTA Visit #: 0/5     Time In: 11:00 am  Time Out: 11:50 am  Total Billable Time: 40 minutes    SUBJECTIVE     Pt reports: Doing the home exercise program and the pool. Sometimes finds more pain L low back (points to PSIS) following the exercise. She purchased a massage mat that helps. Would like to try the exercises on her own for a while and see how it goes. Does not feel her pain is enough to need to take any medication or return to MD.   She was compliant with home exercise program.  Response to previous treatment: no adverse effects  Functional change: improved symptoms with bridging and biking without difficulty     Pain: 5/10  Location: left lateral hip      OBJECTIVE     Lumbar Range of Motion:     percentage Pain   Flexion 100%    -         Extension 50%    Painful L SIJ         Left Side Bending 75% -         Right Side Bending 75% -         Left rotation    50% -         Right Rotation    50% -               Lower Extremity Strength     MMT                                        Left                  Right     Hip:  Flexion                                     4+/5                    5/5  Extension                                4/5                    4/5  Abduction                                4/5                   " 4+/5  Adduction                                5/5                    5/5  External Rotation                    4/5   *                 5/5  Internal rotation                       5/5                    5/5     Knee:  Flexion                                     5/5                    5/5  Extension                                5/5                    5/5            Treatment     Elise received the treatments listed below:      therapeutic exercises to develop strength, flexibility, posture and core stabilization for 25 minutes including:    PPT with ball and BLPD OTB x 20- not today  Bridging with GTB x 20  iso hip abd pilats ring 3" x 20  LTR with PB x 3 min- not today  Sit to stands with pilates ring for bracing (airex pad or height) 2 x 10 (mini squats today)  Prone hip ext over pillow x 20- not today  iso abdominals with PB x 20- not today  dying bug x 10 ea  +Piriformis stretching 30" x 2    continue in HEP:  S/L clams x 20 GTB  Transverse abdominus activation with heel lift 1 min alt  pec stretch over towel roll -     manual therapy techniques: Joint mobilizations, Manual traction and Myofacial release were applied to the: L hip and low bacl for 15 minutes, including:  LAD 30" x 3 L  STM posterior lateral hip  MET for L anterior rotation x 3    Moist heat x 00 minute concurrently with exercise      Patient Education and Home Exercises     Home Exercises Provided and Patient Education Provided     Education provided:   -continued HEP. Reviewed in detail this visit with all questions/ concerns answered. Discussed use of SI belt    Written Home Exercises Provided: yes. Exercises were reviewed and Elise was able to demonstrate them prior to the end of the session.  Elise demonstrated good  understanding of the education provided. See EMR under Patient Instructions for exercises provided during therapy sessions    ASSESSMENT     Sara presents to clinic with c/o continued L sided SI jt pain. Pt demonstrating " improvements in hip strength since beginning skilled care and pain not limiting performance of ADL's. Increased tenderness to palpation of L tensor fascia latae and piriformis. Continued with manual therapy for improved Range of Motion and pain. Pt requesting decreased frequency of care or possible discharge. Reviewed HEP and recommended use of SI belt for management of symptoms with house hold chores. Scheduled pt in 2 weeks to re-evaluate progress with HEP and need for skilled care.     Elise Is progressing well towards her goals.   Pt prognosis is Good.     Pt will continue to benefit from skilled outpatient physical therapy to address the deficits listed in the problem list box on initial evaluation, provide pt/family education and to maximize pt's level of independence in the home and community environment.     Pt's spiritual, cultural and educational needs considered and pt agreeable to plan of care and goals.     Anticipated barriers to physical therapy: none    Goals:     Short Term Goals: 6 weeks   1. Patient to report decreased pain in hips at night by 30% or greater (in progress, not met)  2. Patient to be able to ambulate 1 mile or greater with little to no difficulty (in progress, not met)  3. Patient to improve thoracolumbar extension to % WNL for ease with recreational activities and yoga (in progress, not met)     Long Term Goals: 12 weeks   1. Patient to be independent with home exercise program for improved self management of condition (in progress, not met)  2. Patient to have decreased subjective report of disability as noted by a score of <30% on the FOTO questionnaire (in progress, not met)  3. Patient to have improved balance as noted by SLS 10 sec or greater for decreased risk for falls (in progress, not met)    PLAN     Continue to progress gluteal strengthening in HEP per tolerance. Manual therapy and other modalities prn for pain modulation     Mercedes Neri, PT

## 2022-03-21 ENCOUNTER — CLINICAL SUPPORT (OUTPATIENT)
Dept: REHABILITATION | Facility: OTHER | Age: 67
End: 2022-03-21
Payer: MEDICARE

## 2022-03-21 DIAGNOSIS — G89.29 CHRONIC LEFT-SIDED LOW BACK PAIN WITHOUT SCIATICA: ICD-10-CM

## 2022-03-21 DIAGNOSIS — M54.50 CHRONIC LEFT-SIDED LOW BACK PAIN WITHOUT SCIATICA: ICD-10-CM

## 2022-03-21 DIAGNOSIS — R29.898 WEAKNESS OF LEFT HIP: Primary | ICD-10-CM

## 2022-03-21 DIAGNOSIS — R68.89 DECREASED ACTIVITY TOLERANCE: ICD-10-CM

## 2022-03-21 PROCEDURE — 97110 THERAPEUTIC EXERCISES: CPT | Mod: PN | Performed by: PHYSICAL THERAPIST

## 2022-03-21 PROCEDURE — 97140 MANUAL THERAPY 1/> REGIONS: CPT | Mod: PN | Performed by: PHYSICAL THERAPIST

## 2022-03-21 NOTE — PROGRESS NOTES
[facility]   Physical Therapy Treatment Note     Name: Sara David  Clinic Number: [id number]    Therapy Diagnosis:   Encounter Diagnoses   Name Primary?    Weakness of left hip Yes    Decreased activity tolerance     Chronic left-sided low back pain without sciatica      Physician: Lynda Wheat MD    Visit Date: 3/21/2022    Physician Orders: PT Eval and Treat   Medical Diagnosis from Referral: M25.551,M25.552 (ICD-10-CM) - Bilateral hip pain  Evaluation Date: 1/11/2022  Authorization Period Expiration: 1/25/2022  Plan of Care Expiration: 4/4/2022  Progress Note Due: 2/8/2022  Visit # / Visits authorized: 5/18  FOTO: 2/5     Precautions: Standard     PTA Visit #: 0/5     Time In: 1:00 pm  Time Out: 1:45 pm  Total Billable Time: 40 minutes    SUBJECTIVE     Pt reports: Pain now more midline. She had to cancel an appointment and then there was not availability to reschedule for another month. She has been doing her exercises at home and doing well. Continued pain with pulling weeds in the yard. Would like to review her home exercise program.   She was compliant with home exercise program.  Response to previous treatment: no adverse effects  Functional change: improved symptoms with bridging and biking without difficulty     Pain: 5/10  Location: midline low back and left lateral hip      OBJECTIVE     3/21/2022    Supine to sit: L anterior rotated innominate    2/21/2022    Lumbar Range of Motion:     percentage Pain   Flexion 100%    -         Extension 50%    Painful L SIJ         Left Side Bending 75% -         Right Side Bending 75% -         Left rotation    50% -         Right Rotation    50% -               Lower Extremity Strength     MMT                                        Left                  Right     Hip:  Flexion                                     4+/5                    5/5  Extension                                4/5                    4/5  Abduction     "                            4/5                   4+/5  Adduction                                5/5                    5/5  External Rotation                    4/5   *                 5/5  Internal rotation                       5/5                    5/5     Knee:  Flexion                                     5/5                    5/5  Extension                                5/5                    5/5            Treatment     Elise received the treatments listed below:      therapeutic exercises to develop strength, flexibility, posture and core stabilization for 30 minutes including:    PPT with ball and BLPD OTB x 20- not today  Bridging with BTB x 20 (VC's for PPT)  LTR with PB x 3 min- not today  Sit to stands with pilates ring for bracing (airex pad or height) 2 x 10 (mini squats today)  HL ab crunch x 20  dying bug 2 x 10 ea  Piriformis stretching 30" x 2- not today  S/L clams x 20 BTB  Pallof press GTB x 20 ea  Planks on forearms 10" x 3  Modified side plank 20" x 2    manual therapy techniques: Joint mobilizations, Manual traction and Myofacial release were applied to the: L hip and low bacl for 10 minutes, including:  LAD 30" x 3 Ania  STM posterior lateral hip- nt  MET for L anterior rotation x 3    Moist heat x 00 minute concurrently with exercise      Patient Education and Home Exercises     Home Exercises Provided and Patient Education Provided     Education provided:   -continued HEP. Reviewed in detail this visit and issued updated handout with more advanced core strengthening exercises per tolerance    Written Home Exercises Provided: yes. Exercises were reviewed and Elise was able to demonstrate them prior to the end of the session.  Elise demonstrated good  understanding of the education provided. See EMR under Patient Instructions for exercises provided during therapy sessions    ASSESSMENT     Sara presents to clinic for continued PT for low back and B hip pain. Pt last seen 2/21/2022 due to " limited appointment availability. Pt demonstrating improved motor control lumbopelvic region and good compliance with HEP for self management of condition. Pt able to progress modified to full forward planks on forearms with proper technique and spinal alignement. Updated HEP this session and will return to clinic in 2 weeks to re-evaluate need for continued skilled care.     Elise Is progressing well towards her goals.   Pt prognosis is Good.     Pt will continue to benefit from skilled outpatient physical therapy to address the deficits listed in the problem list box on initial evaluation, provide pt/family education and to maximize pt's level of independence in the home and community environment.     Pt's spiritual, cultural and educational needs considered and pt agreeable to plan of care and goals.     Anticipated barriers to physical therapy: none    Goals:     Short Term Goals: 6 weeks   1. Patient to report decreased pain in hips at night by 30% or greater (in progress, not met)  2. Patient to be able to ambulate 1 mile or greater with little to no difficulty (in progress, not met)  3. Patient to improve thoracolumbar extension to % WNL for ease with recreational activities and yoga (in progress, not met)     Long Term Goals: 12 weeks   1. Patient to be independent with home exercise program for improved self management of condition (in progress, not met)  2. Patient to have decreased subjective report of disability as noted by a score of <30% on the FOTO questionnaire (in progress, not met)  3. Patient to have improved balance as noted by SLS 10 sec or greater for decreased risk for falls (in progress, not met)    PLAN     Plan to Reassess next visit. discharge to [facility] for self management vs extended POC     Mercedes Neri, PT

## 2022-04-04 ENCOUNTER — CLINICAL SUPPORT (OUTPATIENT)
Dept: REHABILITATION | Facility: OTHER | Age: 67
End: 2022-04-04
Payer: MEDICARE

## 2022-04-04 DIAGNOSIS — G89.29 CHRONIC LEFT-SIDED LOW BACK PAIN WITHOUT SCIATICA: ICD-10-CM

## 2022-04-04 DIAGNOSIS — R29.898 WEAKNESS OF LEFT HIP: Primary | ICD-10-CM

## 2022-04-04 DIAGNOSIS — R68.89 DECREASED ACTIVITY TOLERANCE: ICD-10-CM

## 2022-04-04 DIAGNOSIS — M54.50 CHRONIC LEFT-SIDED LOW BACK PAIN WITHOUT SCIATICA: ICD-10-CM

## 2022-04-04 PROCEDURE — 97110 THERAPEUTIC EXERCISES: CPT | Mod: PN | Performed by: PHYSICAL THERAPIST

## 2022-04-04 PROCEDURE — 97140 MANUAL THERAPY 1/> REGIONS: CPT | Mod: PN | Performed by: PHYSICAL THERAPIST

## 2022-04-04 NOTE — PROGRESS NOTES
[facility]   Physical Therapy Treatment Note     Name: Sara David  Clinic Number: [id number]    Therapy Diagnosis:   Encounter Diagnoses   Name Primary?    Weakness of left hip Yes    Decreased activity tolerance     Chronic left-sided low back pain without sciatica      Physician: Lynda Wheat MD    Visit Date: 4/4/2022    Physician Orders: PT Eval and Treat   Medical Diagnosis from Referral: M25.551,M25.552 (ICD-10-CM) - Bilateral hip pain  Evaluation Date: 1/11/2022  Authorization Period Expiration: 1/25/2022  Plan of Care Expiration: 4/4/2022  Progress Note Due: 2/8/2022  Visit # / Visits authorized: 5/18  FOTO: 2/5     Precautions: Standard     PTA Visit #: 0/5     Time In: 1:45 pm  Time Out: 2:30 pm  Total Billable Time: 40 minutes    SUBJECTIVE     Pt reports: Pain during the day much better since beginning PT. Does not feel functionally limited and the exercises have been helpful. Her pain at night L side of low back and hip unchanged at night. Pain wakes her at night. Mild numbness L lateral thigh. Does not have any symptoms below the knee, saddle anesthesia, unexplained weakness. Made an appointment to see her PCP to discuss ongoing problem. Has not been taking any medication for this condition.   She was compliant with home exercise program.  Response to previous treatment: no adverse effects  Functional change: improved symptoms with bridging and biking without difficulty     Pain: 0/10 at rest  Location: midline low back and left lateral hip      OBJECTIVE     4/4/2022    Lumbar Range of Motion:     percentage Pain   Flexion 100%    -         Extension 75%    Painful L SIJ, better following central PA's L4-5         Left Side Bending 75% -         Right Side Bending 75% -         Left rotation    50% -         Right Rotation    50% -               Lower Extremity Strength     MMT                                        Left                   "Right     Hip:  Flexion                                     5/5                    5/5  Extension                                4/5                    4/5  Abduction                                4/5                   4+/5  Adduction                                5/5                    5/5  External Rotation                    4/5                    5/5  Internal rotation                       5/5                    5/5     Knee:  Flexion                                     5/5                    5/5  Extension                                5/5                    5/5      FOTO: 43%      Treatment     Elise received the treatments listed below:      therapeutic exercises to develop strength, flexibility, posture and core stabilization for 25 minutes including:    Transverse abdominus activation with overhead reach 3# x 20  dying bug 2 x 10 ea (added physioball)    Reviewed current HEP including:   Bridging with BTB x 20   Sit to stands or mini squats 2 x 10   Piriformis stretching 30" x 2  S/L clams x 20 BTB  Pallof press GTB x 20 ea  Planks on forearms and knees 10" x 3  Modified side plank 20" x 2    manual therapy techniques: Joint mobilizations, Manual traction and Myofacial release were applied to the: L hip and low bacl for 15 minutes, including:  Central PA's L4-5  Myofascial release L quadratus lumborum and gapping technique over pillow    Moist heat x 00 minute concurrently with exercise      Patient Education and Home Exercises     Home Exercises Provided and Patient Education Provided     Education provided:   -continued HEP. Reviewed in detail each HEP issued and exercises to prioritize. Education on proper technique for dead bugs    Written Home Exercises Provided: yes. Exercises were reviewed and Elise was able to demonstrate them prior to the end of the session.  Elise demonstrated good  understanding of the education provided. See EMR under Patient Instructions for exercises provided during " therapy sessions    ASSESSMENT     Sara demonstrating improvements in LE strength, Range of Motion, and symptoms during the day. Pain best with standing and worst with lying. No progressive neurological symptoms or red flags identified outside of night pain. Trial of prone press ups with exacerbation of L sided low back pain. Improved lumbar extension and pain noted following manual techniques. Patient requesting leave plan of care open without discharge until after appointment with PCP to discuss ongoing issues.     Elise Is progressing well towards her goals.   Pt prognosis is Good.     Pt will continue to benefit from skilled outpatient physical therapy to address the deficits listed in the problem list box on initial evaluation, provide pt/family education and to maximize pt's level of independence in the home and community environment.     Pt's spiritual, cultural and educational needs considered and pt agreeable to plan of care and goals.     Anticipated barriers to physical therapy: none    Goals:     Short Term Goals: 6 weeks   1. Patient to report decreased pain in hips at night by 30% or greater (in progress, not met)  2. Patient to be able to ambulate 1 mile or greater with little to no difficulty (met)  3. Patient to improve thoracolumbar extension to % WNL for ease with recreational activities and yoga (met)     Long Term Goals: 12 weeks   1. Patient to be independent with home exercise program for improved self management of condition (in progress, not met)  2. Patient to have decreased subjective report of disability as noted by a score of <30% on the FOTO questionnaire (in progress, not met)  3. Patient to have improved balance as noted by SLS 10 sec or greater for decreased risk for falls (in progress, not met)    PLAN     Pt to follow up with PCP. Will update POC if continued care recommended.     Mercedes Neri, PT

## 2022-04-18 ENCOUNTER — LAB VISIT (OUTPATIENT)
Dept: LAB | Facility: HOSPITAL | Age: 67
End: 2022-04-18
Payer: MEDICARE

## 2022-04-18 DIAGNOSIS — N18.31 STAGE 3A CHRONIC KIDNEY DISEASE: ICD-10-CM

## 2022-04-18 DIAGNOSIS — Z13.220 ENCOUNTER FOR LIPID SCREENING FOR CARDIOVASCULAR DISEASE: ICD-10-CM

## 2022-04-18 DIAGNOSIS — Z13.6 ENCOUNTER FOR LIPID SCREENING FOR CARDIOVASCULAR DISEASE: ICD-10-CM

## 2022-04-18 DIAGNOSIS — E55.9 VITAMIN D DEFICIENCY: ICD-10-CM

## 2022-04-18 DIAGNOSIS — E03.9 ACQUIRED HYPOTHYROIDISM: ICD-10-CM

## 2022-04-18 DIAGNOSIS — R79.9 ABNORMAL BLOOD CHEMISTRY: ICD-10-CM

## 2022-04-18 LAB
25(OH)D3+25(OH)D2 SERPL-MCNC: 31 NG/ML (ref 30–96)
ALBUMIN SERPL BCP-MCNC: 3.9 G/DL (ref 3.5–5.2)
ALP SERPL-CCNC: 59 U/L (ref 55–135)
ALT SERPL W/O P-5'-P-CCNC: 15 U/L (ref 10–44)
ANION GAP SERPL CALC-SCNC: 8 MMOL/L (ref 8–16)
AST SERPL-CCNC: 17 U/L (ref 10–40)
BASOPHILS # BLD AUTO: 0.05 K/UL (ref 0–0.2)
BASOPHILS NFR BLD: 0.7 % (ref 0–1.9)
BILIRUB SERPL-MCNC: 0.6 MG/DL (ref 0.1–1)
BUN SERPL-MCNC: 17 MG/DL (ref 8–23)
CALCIUM SERPL-MCNC: 9.4 MG/DL (ref 8.7–10.5)
CHLORIDE SERPL-SCNC: 105 MMOL/L (ref 95–110)
CHOLEST SERPL-MCNC: 237 MG/DL (ref 120–199)
CHOLEST/HDLC SERPL: 4.2 {RATIO} (ref 2–5)
CO2 SERPL-SCNC: 28 MMOL/L (ref 23–29)
CREAT SERPL-MCNC: 1 MG/DL (ref 0.5–1.4)
DIFFERENTIAL METHOD: ABNORMAL
EOSINOPHIL # BLD AUTO: 0.2 K/UL (ref 0–0.5)
EOSINOPHIL NFR BLD: 2.8 % (ref 0–8)
ERYTHROCYTE [DISTWIDTH] IN BLOOD BY AUTOMATED COUNT: 12.9 % (ref 11.5–14.5)
EST. GFR  (AFRICAN AMERICAN): >60 ML/MIN/1.73 M^2
EST. GFR  (NON AFRICAN AMERICAN): 58 ML/MIN/1.73 M^2
ESTIMATED AVG GLUCOSE: 97 MG/DL (ref 68–131)
GLUCOSE SERPL-MCNC: 86 MG/DL (ref 70–110)
HBA1C MFR BLD: 5 % (ref 4–5.6)
HCT VFR BLD AUTO: 45.5 % (ref 37–48.5)
HDLC SERPL-MCNC: 57 MG/DL (ref 40–75)
HDLC SERPL: 24.1 % (ref 20–50)
HGB BLD-MCNC: 14.9 G/DL (ref 12–16)
IMM GRANULOCYTES # BLD AUTO: 0.02 K/UL (ref 0–0.04)
IMM GRANULOCYTES NFR BLD AUTO: 0.3 % (ref 0–0.5)
LDLC SERPL CALC-MCNC: 151 MG/DL (ref 63–159)
LYMPHOCYTES # BLD AUTO: 1.9 K/UL (ref 1–4.8)
LYMPHOCYTES NFR BLD: 27.7 % (ref 18–48)
MCH RBC QN AUTO: 31 PG (ref 27–31)
MCHC RBC AUTO-ENTMCNC: 32.7 G/DL (ref 32–36)
MCV RBC AUTO: 95 FL (ref 82–98)
MONOCYTES # BLD AUTO: 0.6 K/UL (ref 0.3–1)
MONOCYTES NFR BLD: 8.7 % (ref 4–15)
NEUTROPHILS # BLD AUTO: 4 K/UL (ref 1.8–7.7)
NEUTROPHILS NFR BLD: 59.8 % (ref 38–73)
NONHDLC SERPL-MCNC: 180 MG/DL
NRBC BLD-RTO: 0 /100 WBC
PLATELET # BLD AUTO: 283 K/UL (ref 150–450)
PMV BLD AUTO: 9 FL (ref 9.2–12.9)
POTASSIUM SERPL-SCNC: 4.4 MMOL/L (ref 3.5–5.1)
PROT SERPL-MCNC: 6.9 G/DL (ref 6–8.4)
RBC # BLD AUTO: 4.81 M/UL (ref 4–5.4)
SODIUM SERPL-SCNC: 141 MMOL/L (ref 136–145)
TRIGL SERPL-MCNC: 145 MG/DL (ref 30–150)
TSH SERPL DL<=0.005 MIU/L-ACNC: 0.8 UIU/ML (ref 0.4–4)
WBC # BLD AUTO: 6.75 K/UL (ref 3.9–12.7)

## 2022-04-18 PROCEDURE — 82306 VITAMIN D 25 HYDROXY: CPT | Performed by: INTERNAL MEDICINE

## 2022-04-18 PROCEDURE — 80061 LIPID PANEL: CPT | Performed by: INTERNAL MEDICINE

## 2022-04-18 PROCEDURE — 85025 COMPLETE CBC W/AUTO DIFF WBC: CPT | Performed by: INTERNAL MEDICINE

## 2022-04-18 PROCEDURE — 80053 COMPREHEN METABOLIC PANEL: CPT | Performed by: INTERNAL MEDICINE

## 2022-04-18 PROCEDURE — 83036 HEMOGLOBIN GLYCOSYLATED A1C: CPT | Performed by: INTERNAL MEDICINE

## 2022-04-18 PROCEDURE — 84443 ASSAY THYROID STIM HORMONE: CPT | Performed by: INTERNAL MEDICINE

## 2022-04-18 PROCEDURE — 36415 COLL VENOUS BLD VENIPUNCTURE: CPT | Performed by: INTERNAL MEDICINE

## 2022-04-20 ENCOUNTER — HOSPITAL ENCOUNTER (OUTPATIENT)
Dept: RADIOLOGY | Facility: HOSPITAL | Age: 67
Discharge: HOME | End: 2022-04-20
Attending: INTERNAL MEDICINE
Payer: MEDICARE

## 2022-04-20 ENCOUNTER — OFFICE VISIT (OUTPATIENT)
Dept: INTERNAL MEDICINE | Facility: CLINIC | Age: 67
End: 2022-04-20
Payer: MEDICARE

## 2022-04-20 VITALS
HEIGHT: 71 IN | DIASTOLIC BLOOD PRESSURE: 70 MMHG | OXYGEN SATURATION: 98 % | HEART RATE: 81 BPM | SYSTOLIC BLOOD PRESSURE: 122 MMHG | BODY MASS INDEX: 28.7 KG/M2 | WEIGHT: 205 LBS

## 2022-04-20 DIAGNOSIS — S69.91XA HAND INJURY, RIGHT, INITIAL ENCOUNTER: ICD-10-CM

## 2022-04-20 DIAGNOSIS — M25.552 CHRONIC LEFT HIP PAIN: ICD-10-CM

## 2022-04-20 DIAGNOSIS — E03.9 ACQUIRED HYPOTHYROIDISM: ICD-10-CM

## 2022-04-20 DIAGNOSIS — G47.9 SLEEP DIFFICULTIES: ICD-10-CM

## 2022-04-20 DIAGNOSIS — G89.29 CHRONIC LEFT HIP PAIN: ICD-10-CM

## 2022-04-20 DIAGNOSIS — N18.31 STAGE 3A CHRONIC KIDNEY DISEASE: Primary | ICD-10-CM

## 2022-04-20 DIAGNOSIS — E55.9 VITAMIN D DEFICIENCY: ICD-10-CM

## 2022-04-20 DIAGNOSIS — M16.0 BILATERAL HIP JOINT ARTHRITIS: ICD-10-CM

## 2022-04-20 PROCEDURE — 99499 UNLISTED E&M SERVICE: CPT | Mod: S$GLB,,, | Performed by: INTERNAL MEDICINE

## 2022-04-20 PROCEDURE — 99214 OFFICE O/P EST MOD 30 MIN: CPT | Mod: S$GLB,,, | Performed by: INTERNAL MEDICINE

## 2022-04-20 PROCEDURE — 1160F RVW MEDS BY RX/DR IN RCRD: CPT | Mod: CPTII,S$GLB,, | Performed by: INTERNAL MEDICINE

## 2022-04-20 PROCEDURE — 1125F PR PAIN SEVERITY QUANTIFIED, PAIN PRESENT: ICD-10-PCS | Mod: CPTII,S$GLB,, | Performed by: INTERNAL MEDICINE

## 2022-04-20 PROCEDURE — 73130 XR HAND COMPLETE 3 VIEW RIGHT: ICD-10-PCS | Mod: 26,RT,, | Performed by: RADIOLOGY

## 2022-04-20 PROCEDURE — 1125F AMNT PAIN NOTED PAIN PRSNT: CPT | Mod: CPTII,S$GLB,, | Performed by: INTERNAL MEDICINE

## 2022-04-20 PROCEDURE — 3288F FALL RISK ASSESSMENT DOCD: CPT | Mod: CPTII,S$GLB,, | Performed by: INTERNAL MEDICINE

## 2022-04-20 PROCEDURE — 1159F PR MEDICATION LIST DOCUMENTED IN MEDICAL RECORD: ICD-10-PCS | Mod: CPTII,S$GLB,, | Performed by: INTERNAL MEDICINE

## 2022-04-20 PROCEDURE — 3078F PR MOST RECENT DIASTOLIC BLOOD PRESSURE < 80 MM HG: ICD-10-PCS | Mod: CPTII,S$GLB,, | Performed by: INTERNAL MEDICINE

## 2022-04-20 PROCEDURE — 99999 PR PBB SHADOW E&M-EST. PATIENT-LVL IV: ICD-10-PCS | Mod: PBBFAC,,, | Performed by: INTERNAL MEDICINE

## 2022-04-20 PROCEDURE — 3288F PR FALLS RISK ASSESSMENT DOCUMENTED: ICD-10-PCS | Mod: CPTII,S$GLB,, | Performed by: INTERNAL MEDICINE

## 2022-04-20 PROCEDURE — 3008F PR BODY MASS INDEX (BMI) DOCUMENTED: ICD-10-PCS | Mod: CPTII,S$GLB,, | Performed by: INTERNAL MEDICINE

## 2022-04-20 PROCEDURE — 73130 X-RAY EXAM OF HAND: CPT | Mod: TC,RT

## 2022-04-20 PROCEDURE — 1101F PR PT FALLS ASSESS DOC 0-1 FALLS W/OUT INJ PAST YR: ICD-10-PCS | Mod: CPTII,S$GLB,, | Performed by: INTERNAL MEDICINE

## 2022-04-20 PROCEDURE — 73130 X-RAY EXAM OF HAND: CPT | Mod: 26,RT,, | Performed by: RADIOLOGY

## 2022-04-20 PROCEDURE — 3074F SYST BP LT 130 MM HG: CPT | Mod: CPTII,S$GLB,, | Performed by: INTERNAL MEDICINE

## 2022-04-20 PROCEDURE — 99999 PR PBB SHADOW E&M-EST. PATIENT-LVL IV: CPT | Mod: PBBFAC,,, | Performed by: INTERNAL MEDICINE

## 2022-04-20 PROCEDURE — 1159F MED LIST DOCD IN RCRD: CPT | Mod: CPTII,S$GLB,, | Performed by: INTERNAL MEDICINE

## 2022-04-20 PROCEDURE — 1101F PT FALLS ASSESS-DOCD LE1/YR: CPT | Mod: CPTII,S$GLB,, | Performed by: INTERNAL MEDICINE

## 2022-04-20 PROCEDURE — 3044F PR MOST RECENT HEMOGLOBIN A1C LEVEL <7.0%: ICD-10-PCS | Mod: CPTII,S$GLB,, | Performed by: INTERNAL MEDICINE

## 2022-04-20 PROCEDURE — 99499 RISK ADDL DX/OHS AUDIT: ICD-10-PCS | Mod: S$GLB,,, | Performed by: INTERNAL MEDICINE

## 2022-04-20 PROCEDURE — 99214 PR OFFICE/OUTPT VISIT, EST, LEVL IV, 30-39 MIN: ICD-10-PCS | Mod: S$GLB,,, | Performed by: INTERNAL MEDICINE

## 2022-04-20 PROCEDURE — 3074F PR MOST RECENT SYSTOLIC BLOOD PRESSURE < 130 MM HG: ICD-10-PCS | Mod: CPTII,S$GLB,, | Performed by: INTERNAL MEDICINE

## 2022-04-20 PROCEDURE — 3078F DIAST BP <80 MM HG: CPT | Mod: CPTII,S$GLB,, | Performed by: INTERNAL MEDICINE

## 2022-04-20 PROCEDURE — 3008F BODY MASS INDEX DOCD: CPT | Mod: CPTII,S$GLB,, | Performed by: INTERNAL MEDICINE

## 2022-04-20 PROCEDURE — 3044F HG A1C LEVEL LT 7.0%: CPT | Mod: CPTII,S$GLB,, | Performed by: INTERNAL MEDICINE

## 2022-04-20 PROCEDURE — 1160F PR REVIEW ALL MEDS BY PRESCRIBER/CLIN PHARMACIST DOCUMENTED: ICD-10-PCS | Mod: CPTII,S$GLB,, | Performed by: INTERNAL MEDICINE

## 2022-04-20 RX ORDER — TRAZODONE HYDROCHLORIDE 50 MG/1
TABLET ORAL
Qty: 90 TABLET | Refills: 3 | Status: SHIPPED | OUTPATIENT
Start: 2022-04-20 | End: 2023-01-12 | Stop reason: SDUPTHER

## 2022-04-20 RX ORDER — LEVOTHYROXINE SODIUM 88 UG/1
88 TABLET ORAL
Qty: 90 TABLET | Refills: 3 | Status: SHIPPED | OUTPATIENT
Start: 2022-04-20 | End: 2023-01-12 | Stop reason: SDUPTHER

## 2022-04-20 RX ORDER — ACETAMINOPHEN 500 MG
500 TABLET ORAL NIGHTLY
Status: ON HOLD | COMMUNITY
End: 2023-09-18 | Stop reason: HOSPADM

## 2022-04-20 NOTE — PROGRESS NOTES
INTERNAL MEDICINE ESTABLISHED PATIENT VISIT NOTE    Subjective:     Chief Complaint: Follow-up       Patient ID: Sara David is a 66 y.o. female with CKD III, melanoma in situ, hypothyroidism, vit D def, last seen by me 11/2021, here today for follow-up.    Continuing to take Trazodone, Zyrtec and Tylenol to help with sleep.     Reports significant pain of last digit of R hand. Remote injury, possibly while doing yardwork. Persistent pain and swelling of digit. Unable to grasp objects without pain.    Completed course of PT for hip pain, somewhat helpful. Requesting further evaluation.      Non-adherent to heart healthy diet recently.     Past Medical History:  Past Medical History:   Diagnosis Date    Colon polyp 2008    Disorder of kidney and ureter     Endometriosis     History of melanoma in situ 4/17/2018    Left arm, s/p excision by Alexandre Gomez MD    Hypothyroidism     dx in her 40s, enlarged thyroid, did have FNA of thyroid nodule which was benign    Melanoma 1990, 2018 1990s - on back; 2018 - L upper arm    Miscarriage           Review of Systems:  Review of Systems   Constitutional: Negative for chills and fever.   HENT: Negative for congestion.    Respiratory: Negative for cough and shortness of breath.    Cardiovascular: Negative for chest pain.   Gastrointestinal: Negative for constipation, nausea and vomiting.   Genitourinary: Negative for hematuria and urgency.   Musculoskeletal: Positive for back pain and joint pain (hip). Negative for falls.   Skin: Negative for rash.   Neurological: Negative for dizziness and loss of consciousness.       Health Maintenance:   Immunizations:   Influenza - complete  Tdap - next visit  Covid 19 - complete  HPV  Prevnar rec at 63 - Prevnar 5/2019, Pneumovax 11/2021  Shingrix rec at 48 - complete     Cancer Screening:  PAP: 3/2018 NILM  Mammogram:  5/2021 BIRAD 1  Colonoscopy:  1/2020 2 polyps, repeat 1/2023  DEXA:  3/2019 WNL, repeat at next  "visit  Skin exam: 5/2021    Objective:   /70 (BP Location: Left arm, Patient Position: Sitting, BP Method: Medium (Manual))   Pulse 81   Ht 5' 11" (1.803 m)   Wt 93 kg (205 lb)   LMP 01/17/2020   SpO2 98%   BMI 28.59 kg/m²      General: AAO x3, no apparent distress  HEENT: PERRL  CV: RRR, no m/r/g  Pulm: Lungs CTAB, no crackles, no wheezes  Abd: s/NT/ND +BS  Extremities: no c/c/e  MSK: R 5th digit with decreased flexion/extension, DIP with Heberden's node  Labs:       Assessment/Plan     Stage 3a chronic kidney disease  Baseline Crt 1-1.1  Avoid NSAIDs    Acquired hypothyroidism  Clinically and biochemically euthyroid  -     levothyroxine (SYNTHROID) 88 MCG tablet; Take 1 tablet (88 mcg total) by mouth before breakfast.  Dispense: 90 tablet; Refill: 3    Bilateral hip joint arthritis  S/p course of PT and OTC pain control, requesting specialist evaluation  -     Ambulatory referral/consult to Orthopedics; Future; Expected date: 04/27/2022    Chronic left hip pain  As above  -     Ambulatory referral/consult to Orthopedics; Future; Expected date: 04/27/2022    Hand injury, right, initial encounter  Initial injury many weeks ago, discussed intervention unlikely   Obtain imaging, further recommendations upon review  -     X-Ray Hand Complete Right; Future; Expected date: 04/20/2022    Vitamin D deficiency  Resolved, continue supplement    Sleep difficulties  -     traZODone (DESYREL) 50 MG tablet; TAKE 1 TABLET(50 MG) BY MOUTH NIGHTLY AS NEEDED FOR INSOMNIA  Dispense: 90 tablet; Refill: 3    HM as above    RTC in 6 mo, sooner if needed.    Gianna Wheat MD  Department of Internal Medicine - [facility]  04/20/2022    "

## 2022-05-09 ENCOUNTER — OFFICE VISIT (OUTPATIENT)
Dept: SPORTS MEDICINE | Facility: CLINIC | Age: 67
End: 2022-05-09
Payer: MEDICARE

## 2022-05-09 ENCOUNTER — APPOINTMENT (OUTPATIENT)
Dept: RADIOLOGY | Facility: OTHER | Age: 67
End: 2022-05-09
Attending: NEUROMUSCULOSKELETAL MEDICINE & OMM
Payer: MEDICARE

## 2022-05-09 VITALS
HEIGHT: 71 IN | WEIGHT: 205 LBS | SYSTOLIC BLOOD PRESSURE: 110 MMHG | DIASTOLIC BLOOD PRESSURE: 72 MMHG | BODY MASS INDEX: 28.7 KG/M2

## 2022-05-09 DIAGNOSIS — M25.552 CHRONIC LEFT HIP PAIN: ICD-10-CM

## 2022-05-09 DIAGNOSIS — G89.29 CHRONIC BILATERAL LOW BACK PAIN: ICD-10-CM

## 2022-05-09 DIAGNOSIS — G89.29 CHRONIC LEFT HIP PAIN: ICD-10-CM

## 2022-05-09 DIAGNOSIS — M47.816 LUMBAR SPONDYLOSIS: ICD-10-CM

## 2022-05-09 DIAGNOSIS — M54.50 CHRONIC BILATERAL LOW BACK PAIN WITHOUT SCIATICA: Primary | ICD-10-CM

## 2022-05-09 DIAGNOSIS — M16.0 BILATERAL HIP JOINT ARTHRITIS: ICD-10-CM

## 2022-05-09 DIAGNOSIS — M54.50 CHRONIC BILATERAL LOW BACK PAIN: ICD-10-CM

## 2022-05-09 DIAGNOSIS — M51.36 DDD (DEGENERATIVE DISC DISEASE), LUMBAR: ICD-10-CM

## 2022-05-09 DIAGNOSIS — G89.29 CHRONIC BILATERAL LOW BACK PAIN WITHOUT SCIATICA: Primary | ICD-10-CM

## 2022-05-09 PROCEDURE — 3044F PR MOST RECENT HEMOGLOBIN A1C LEVEL <7.0%: ICD-10-PCS | Mod: CPTII,S$GLB,, | Performed by: NEUROMUSCULOSKELETAL MEDICINE & OMM

## 2022-05-09 PROCEDURE — 3288F PR FALLS RISK ASSESSMENT DOCUMENTED: ICD-10-PCS | Mod: CPTII,S$GLB,, | Performed by: NEUROMUSCULOSKELETAL MEDICINE & OMM

## 2022-05-09 PROCEDURE — 99214 PR OFFICE/OUTPT VISIT, EST, LEVL IV, 30-39 MIN: ICD-10-PCS | Mod: S$GLB,,, | Performed by: NEUROMUSCULOSKELETAL MEDICINE & OMM

## 2022-05-09 PROCEDURE — 3008F BODY MASS INDEX DOCD: CPT | Mod: CPTII,S$GLB,, | Performed by: NEUROMUSCULOSKELETAL MEDICINE & OMM

## 2022-05-09 PROCEDURE — 1101F PR PT FALLS ASSESS DOC 0-1 FALLS W/OUT INJ PAST YR: ICD-10-PCS | Mod: CPTII,S$GLB,, | Performed by: NEUROMUSCULOSKELETAL MEDICINE & OMM

## 2022-05-09 PROCEDURE — 99999 PR PBB SHADOW E&M-EST. PATIENT-LVL III: ICD-10-PCS | Mod: PBBFAC,,, | Performed by: NEUROMUSCULOSKELETAL MEDICINE & OMM

## 2022-05-09 PROCEDURE — 1159F PR MEDICATION LIST DOCUMENTED IN MEDICAL RECORD: ICD-10-PCS | Mod: CPTII,S$GLB,, | Performed by: NEUROMUSCULOSKELETAL MEDICINE & OMM

## 2022-05-09 PROCEDURE — 3044F HG A1C LEVEL LT 7.0%: CPT | Mod: CPTII,S$GLB,, | Performed by: NEUROMUSCULOSKELETAL MEDICINE & OMM

## 2022-05-09 PROCEDURE — 1160F PR REVIEW ALL MEDS BY PRESCRIBER/CLIN PHARMACIST DOCUMENTED: ICD-10-PCS | Mod: CPTII,S$GLB,, | Performed by: NEUROMUSCULOSKELETAL MEDICINE & OMM

## 2022-05-09 PROCEDURE — 3074F PR MOST RECENT SYSTOLIC BLOOD PRESSURE < 130 MM HG: ICD-10-PCS | Mod: CPTII,S$GLB,, | Performed by: NEUROMUSCULOSKELETAL MEDICINE & OMM

## 2022-05-09 PROCEDURE — 72114 X-RAY EXAM L-S SPINE BENDING: CPT | Mod: TC

## 2022-05-09 PROCEDURE — 1159F MED LIST DOCD IN RCRD: CPT | Mod: CPTII,S$GLB,, | Performed by: NEUROMUSCULOSKELETAL MEDICINE & OMM

## 2022-05-09 PROCEDURE — 99214 OFFICE O/P EST MOD 30 MIN: CPT | Mod: S$GLB,,, | Performed by: NEUROMUSCULOSKELETAL MEDICINE & OMM

## 2022-05-09 PROCEDURE — 99999 PR PBB SHADOW E&M-EST. PATIENT-LVL III: CPT | Mod: PBBFAC,,, | Performed by: NEUROMUSCULOSKELETAL MEDICINE & OMM

## 2022-05-09 PROCEDURE — 3288F FALL RISK ASSESSMENT DOCD: CPT | Mod: CPTII,S$GLB,, | Performed by: NEUROMUSCULOSKELETAL MEDICINE & OMM

## 2022-05-09 PROCEDURE — 3078F DIAST BP <80 MM HG: CPT | Mod: CPTII,S$GLB,, | Performed by: NEUROMUSCULOSKELETAL MEDICINE & OMM

## 2022-05-09 PROCEDURE — 3008F PR BODY MASS INDEX (BMI) DOCUMENTED: ICD-10-PCS | Mod: CPTII,S$GLB,, | Performed by: NEUROMUSCULOSKELETAL MEDICINE & OMM

## 2022-05-09 PROCEDURE — 1101F PT FALLS ASSESS-DOCD LE1/YR: CPT | Mod: CPTII,S$GLB,, | Performed by: NEUROMUSCULOSKELETAL MEDICINE & OMM

## 2022-05-09 PROCEDURE — 3074F SYST BP LT 130 MM HG: CPT | Mod: CPTII,S$GLB,, | Performed by: NEUROMUSCULOSKELETAL MEDICINE & OMM

## 2022-05-09 PROCEDURE — 1160F RVW MEDS BY RX/DR IN RCRD: CPT | Mod: CPTII,S$GLB,, | Performed by: NEUROMUSCULOSKELETAL MEDICINE & OMM

## 2022-05-09 PROCEDURE — 72114 X-RAY EXAM L-S SPINE BENDING: CPT | Mod: 26,,, | Performed by: INTERNAL MEDICINE

## 2022-05-09 PROCEDURE — 3078F PR MOST RECENT DIASTOLIC BLOOD PRESSURE < 80 MM HG: ICD-10-PCS | Mod: CPTII,S$GLB,, | Performed by: NEUROMUSCULOSKELETAL MEDICINE & OMM

## 2022-05-09 PROCEDURE — 72114 XR LUMBAR SPINE 5 VIEW WITH FLEX AND EXT: ICD-10-PCS | Mod: 26,,, | Performed by: INTERNAL MEDICINE

## 2022-05-09 NOTE — PROGRESS NOTES
"Subjective:     Sara David     Chief Complaint   Patient presents with    Follow-up       HPI      Elise is a 66 y.o. female coming in today for bilatrel hip pain, left worse than right. Since last visit the pain has Improved  But still having L>R low back pain with "quick movements" such as stubbing her toe. Does have some lateral hip pain at night.  Has been compliant with her HEP and doing water aerobics. Finished PT 4/4/22. The pain is better with Advil, rest, stretching and worse with sitting, walking, activity. Pt. describes the pain as a 2/10 achy pain that does not radiate. There has not been any new a fall/injury/ or traumas since last visit.  Pt. denies any new musculoskeletal complaints at this time.     Office note from 6/8/21 reviewed     Joint instability? no  Mechanical locking/clicking? no  Affecting ADL's? yes  Affecting sleep? yes    Occupation: retired, water     Review of Systems   Constitutional: Negative for chills and fever.   Musculoskeletal: Positive for joint pain. Negative for back pain, falls, myalgias and neck pain.   Neurological: Negative for dizziness, tingling, focal weakness, weakness and headaches.       PAST MEDICAL HISTORY:   Past Medical History:   Diagnosis Date    Colon polyp 2008    Disorder of kidney and ureter     Endometriosis     History of melanoma in situ 4/17/2018    Left arm, s/p excision by Alexandre Gomez MD    Hypothyroidism     dx in her 40s, enlarged thyroid, did have FNA of thyroid nodule which was benign    Melanoma 1990, 2018 1990s - on back; 2018 - L upper arm    Miscarriage      PAST SURGICAL HISTORY:   Past Surgical History:   Procedure Laterality Date    BACK SURGERY      DILATION AND CURETTAGE OF UTERUS      History of polyps    FNA thyroid      in her 40s    MALIGNANT SKIN LESION EXCISION      1990s, 2018    SALPINGOOPHORECTOMY      WISDOM TOOTH EXTRACTION       FAMILY HISTORY:   Family History   Problem Relation " "Age of Onset    Hypertension Father     Breast cancer Sister     Bone cancer Sister     Parkinsonism Sister     Multiple sclerosis Sister     Lymphoma Maternal Aunt     Arthritis Mother     Chronic back pain Brother     Colon cancer Neg Hx     Diabetes Neg Hx     Miscarriages / Stillbirths Neg Hx     Stroke Neg Hx     Melanoma Neg Hx      SOCIAL HISTORY:   Social History     Socioeconomic History    Marital status:    Tobacco Use    Smoking status: Never Smoker    Smokeless tobacco: Never Used   Substance and Sexual Activity    Alcohol use: Yes     Comment: 2-3 monthly    Drug use: Yes     Types: Marijuana    Sexual activity: Yes     Partners: Male   Other Topics Concern    Are you pregnant or think you may be? No    Breast-feeding No   Social History Narrative    Doing water aerobic three times weekly. Also walks.        MEDICATIONS:   Current Outpatient Medications:     acetaminophen (TYLENOL) 500 MG tablet, Take 500 mg by mouth every evening., Disp: , Rfl:     cetirizine (ZYRTEC) 10 MG tablet, Take 10 mg by mouth once daily., Disp: , Rfl:     cholecalciferol, vitamin D3, (VITAMIN D3) 100 mcg (4,000 unit) Cap, Take 1 capsule by mouth., Disp: , Rfl:     CLIMARA PRO 0.045-0.015 mg/24 hr, APPLY 1 PATCH TOPICALLY TO THE SKIN 1 TIME A WEEK, Disp: 4 patch, Rfl: 11    levothyroxine (SYNTHROID) 88 MCG tablet, Take 1 tablet (88 mcg total) by mouth before breakfast., Disp: 90 tablet, Rfl: 3    traZODone (DESYREL) 50 MG tablet, TAKE 1 TABLET(50 MG) BY MOUTH NIGHTLY AS NEEDED FOR INSOMNIA, Disp: 90 tablet, Rfl: 3  ALLERGIES:   Review of patient's allergies indicates:   Allergen Reactions    Iodinated contrast media        Objective:     VITAL SIGNS: /72   Ht 5' 11" (1.803 m)   Wt 93 kg (205 lb)   LMP 01/17/2020   BMI 28.59 kg/m²    General    Vitals reviewed.  Constitutional: She is oriented to person, place, and time. She appears well-developed and well-nourished.   Neurological: " She is alert and oriented to person, place, and time.   Psychiatric: She has a normal mood and affect. Her behavior is normal.               MUSCULOSKELETAL EXAM  HIP: Bilateral HIP  The affected hip is compared to the contralateral hip.    Observation:    There is no edema, erythema, or ecchymosis in the lumbosacral region.   There is no Trendelenburg sign on either side  No obvious pelvic obliquity while standing.    No thoracolumbar scoliosis observed.    No midline skin abnormalities.  No atrophy noted in the lower limbs.  Gait: Non-antalgic with Neutral ankle mechanics and Neutral medial arch  improved right pelvic stability with decreased left hip hiking compensatory pattern noted with single leg raise    ROM (* = with pain):  Passive hip flexion to 120° on left and 120° on right  Passive hip internal rotation to 45° on left and 45° on right  Passive hip external rotation to 45° on left and 45° on right  Passive hip abduction to 45° on left and 45° on right    Tenderness To Palpation:  No tenderness at the ASIS, AIIS, PSIS, PIIS, iliac crest, pubic bones, ischial tuberosity.  + tenderness throughout the lumbar spine, iliolumbar region, or posterior pelvis.  No tenderness throughout the  sacrum   No tenderness at the piriformis  No tenderness over the bilateral greater trochanteric bursa  No tenderness at the bilateral glut attachments on the greater trochanter  No  tenderness over the proximal IT bands  No tenderness at the hip flexor musculature.    Strength Testing (* = with pain):  Hip flexion - 5/5 on left and 5/5 on right  Hip extension - 5/5 on left and 5/5 on right  Hip adduction - 5/5 on left and 5/5 on right  Hip abduction - 5/5 on left and 5/5 on right  Knee flexion - 5/5 on left and 5/5 on right  Knee extension - 5/5 on left and 5/5 on right  Glutaeus medius - 5/5 on left and 5/5 on right    Special Tests:  Standing Trendelenburg test - negative    Seated straight leg raise - negative  Supine  straight leg raise - negative  Hamstring flexibility symmetric    Log roll - negative  ROXANNE - negative  FADIR - negative  Scour test - negative  No pain with posterior hip capsule compression    ASIS compression test - negative  SI drawer test - negative   Thigh thrust test - negative     Piriformis test (Bonnet's) - negative  Quadriceps flexibility symmetric.  Radha test - negative  Matthew compression test - negative    Leg lengths symmetric    Neurovascular Exam:  Normal gait without Trendelenburg or antalgia.  2+ femoral, DP, and PT pulses BL.  No skin changes, no abnormal hair distribution.  Sensation intact to light touch throughout the obturator and medial/lateral/posterior femoral cutaneous nerves.  Capillary refill intact to <2 seconds in all lower limb digits.    IMAGIN. X-ray ordered due to bilateral low back pain. (AP, lateral, bilateral obliques, L5-S1 joint, flexion and extension views) taken today.   2. X-ray images were reviewed personally by me and then directly with patient.  3. FINDINGS: X-ray images obtained demonstrate mild curvature of the lumbar spine.  The vertebral bodies are normal in height.  Minimal retrolisthesis present at L3-4.  Remaining vertebral bodies are normally aligned.  There is multilevel disc space narrowing and mild osteophytic spurring along vertebral endplates.  There are facet degenerative changes.  There is no significant instability with flexion or extension.  4. IMPRESSION:  Multilevel lumbar spondylosis and DDD with minimal retrolisthesis present at L3-4. There is no significant instability with flexion or extension.        Assessment:      Encounter Diagnoses   Name Primary?    Chronic bilateral low back pain without sciatica Yes    Lumbar spondylosis     DDD (degenerative disc disease), lumbar     Chronic left hip pain     Bilateral hip joint arthritis           Plan:   1. Improvement in bilateral lateral hip pain with formal PT. However, pt. continues to  have persistent chronic low back pain with underlying multilevel lumbar DDD and spondylosis noted on today's lumbar spine x-rays.  Lumbar spine MRI ordered for further evaluation.   - Recommend continuing OTC Tylenol or naproxen 220 mg po BID with food prn for pain control  - Recommend continuing Ice up to 20 minutes at a time  - discussed option of referral to Pain Management for further evaluation and injection treatments of the lumbar spine as next step, pending lumbar spine MRI  -  X-ray images of left hip taken 6/8/21 (AP pelvis and frogleg lateral  left views) showed mild DJD changes bilaterally. Images were personally reviewed with patient.  -  X-ray images of lumbar spine taken today (AP, lateral, bilateral obliques, L5-S1 joint, flexion and extension views) showed multilevel lumbar spondylosis and DDD with minimal retrolisthesis present at L3-4. There is no significant instability with flexion or extension.. Images were personally reviewed with patient.    2. Follow-up in 1-2 weeks for reevaluation and review of lumbar spine MRI via virtual visit.     3. Patient agreeable to today's plan and all questions were answered    This note is dictated using the M*Modal Fluency Direct word recognition program. There are word recognition mistakes that are occasionally missed on review.

## 2022-05-26 ENCOUNTER — PATIENT MESSAGE (OUTPATIENT)
Dept: SPORTS MEDICINE | Facility: CLINIC | Age: 67
End: 2022-05-26
Payer: MEDICARE

## 2022-05-26 NOTE — PROGRESS NOTES
MICHAEL with patient- MRI scheduled for 5:45 tonight. Callback number [phone].    Sagrario Perez MS, OTC  Clinical Assistant to Dr. Lorena Guy

## 2022-06-06 ENCOUNTER — OFFICE VISIT (OUTPATIENT)
Dept: OBSTETRICS AND GYNECOLOGY | Facility: CLINIC | Age: 67
End: 2022-06-06
Payer: MEDICARE

## 2022-06-06 VITALS
BODY MASS INDEX: 28.05 KG/M2 | HEIGHT: 71 IN | DIASTOLIC BLOOD PRESSURE: 70 MMHG | WEIGHT: 200.38 LBS | SYSTOLIC BLOOD PRESSURE: 108 MMHG

## 2022-06-06 DIAGNOSIS — Z78.0 MENOPAUSE: Primary | ICD-10-CM

## 2022-06-06 DIAGNOSIS — Z12.31 ENCOUNTER FOR SCREENING MAMMOGRAM FOR MALIGNANT NEOPLASM OF BREAST: ICD-10-CM

## 2022-06-06 DIAGNOSIS — N95.0 POSTMENOPAUSAL BLEEDING: ICD-10-CM

## 2022-06-06 DIAGNOSIS — N95.1 MENOPAUSAL HOT FLUSHES: ICD-10-CM

## 2022-06-06 DIAGNOSIS — Z12.39 ENCOUNTER FOR SCREENING FOR MALIGNANT NEOPLASM OF BREAST, UNSPECIFIED SCREENING MODALITY: ICD-10-CM

## 2022-06-06 PROCEDURE — 3008F PR BODY MASS INDEX (BMI) DOCUMENTED: ICD-10-PCS | Mod: CPTII,S$GLB,, | Performed by: OBSTETRICS & GYNECOLOGY

## 2022-06-06 PROCEDURE — 3078F PR MOST RECENT DIASTOLIC BLOOD PRESSURE < 80 MM HG: ICD-10-PCS | Mod: CPTII,S$GLB,, | Performed by: OBSTETRICS & GYNECOLOGY

## 2022-06-06 PROCEDURE — 1160F PR REVIEW ALL MEDS BY PRESCRIBER/CLIN PHARMACIST DOCUMENTED: ICD-10-PCS | Mod: CPTII,S$GLB,, | Performed by: OBSTETRICS & GYNECOLOGY

## 2022-06-06 PROCEDURE — 1101F PR PT FALLS ASSESS DOC 0-1 FALLS W/OUT INJ PAST YR: ICD-10-PCS | Mod: CPTII,S$GLB,, | Performed by: OBSTETRICS & GYNECOLOGY

## 2022-06-06 PROCEDURE — 3008F BODY MASS INDEX DOCD: CPT | Mod: CPTII,S$GLB,, | Performed by: OBSTETRICS & GYNECOLOGY

## 2022-06-06 PROCEDURE — 99213 PR OFFICE/OUTPT VISIT, EST, LEVL III, 20-29 MIN: ICD-10-PCS | Mod: S$GLB,,, | Performed by: OBSTETRICS & GYNECOLOGY

## 2022-06-06 PROCEDURE — 99999 PR PBB SHADOW E&M-EST. PATIENT-LVL III: CPT | Mod: PBBFAC,,, | Performed by: OBSTETRICS & GYNECOLOGY

## 2022-06-06 PROCEDURE — 3074F SYST BP LT 130 MM HG: CPT | Mod: CPTII,S$GLB,, | Performed by: OBSTETRICS & GYNECOLOGY

## 2022-06-06 PROCEDURE — 1159F PR MEDICATION LIST DOCUMENTED IN MEDICAL RECORD: ICD-10-PCS | Mod: CPTII,S$GLB,, | Performed by: OBSTETRICS & GYNECOLOGY

## 2022-06-06 PROCEDURE — 99213 OFFICE O/P EST LOW 20 MIN: CPT | Mod: S$GLB,,, | Performed by: OBSTETRICS & GYNECOLOGY

## 2022-06-06 PROCEDURE — 3288F FALL RISK ASSESSMENT DOCD: CPT | Mod: CPTII,S$GLB,, | Performed by: OBSTETRICS & GYNECOLOGY

## 2022-06-06 PROCEDURE — 1160F RVW MEDS BY RX/DR IN RCRD: CPT | Mod: CPTII,S$GLB,, | Performed by: OBSTETRICS & GYNECOLOGY

## 2022-06-06 PROCEDURE — 3044F HG A1C LEVEL LT 7.0%: CPT | Mod: CPTII,S$GLB,, | Performed by: OBSTETRICS & GYNECOLOGY

## 2022-06-06 PROCEDURE — 1126F PR PAIN SEVERITY QUANTIFIED, NO PAIN PRESENT: ICD-10-PCS | Mod: CPTII,S$GLB,, | Performed by: OBSTETRICS & GYNECOLOGY

## 2022-06-06 PROCEDURE — 1159F MED LIST DOCD IN RCRD: CPT | Mod: CPTII,S$GLB,, | Performed by: OBSTETRICS & GYNECOLOGY

## 2022-06-06 PROCEDURE — 3078F DIAST BP <80 MM HG: CPT | Mod: CPTII,S$GLB,, | Performed by: OBSTETRICS & GYNECOLOGY

## 2022-06-06 PROCEDURE — 1126F AMNT PAIN NOTED NONE PRSNT: CPT | Mod: CPTII,S$GLB,, | Performed by: OBSTETRICS & GYNECOLOGY

## 2022-06-06 PROCEDURE — 1101F PT FALLS ASSESS-DOCD LE1/YR: CPT | Mod: CPTII,S$GLB,, | Performed by: OBSTETRICS & GYNECOLOGY

## 2022-06-06 PROCEDURE — 99999 PR PBB SHADOW E&M-EST. PATIENT-LVL III: ICD-10-PCS | Mod: PBBFAC,,, | Performed by: OBSTETRICS & GYNECOLOGY

## 2022-06-06 PROCEDURE — 3044F PR MOST RECENT HEMOGLOBIN A1C LEVEL <7.0%: ICD-10-PCS | Mod: CPTII,S$GLB,, | Performed by: OBSTETRICS & GYNECOLOGY

## 2022-06-06 PROCEDURE — 3288F PR FALLS RISK ASSESSMENT DOCUMENTED: ICD-10-PCS | Mod: CPTII,S$GLB,, | Performed by: OBSTETRICS & GYNECOLOGY

## 2022-06-06 PROCEDURE — 3074F PR MOST RECENT SYSTOLIC BLOOD PRESSURE < 130 MM HG: ICD-10-PCS | Mod: CPTII,S$GLB,, | Performed by: OBSTETRICS & GYNECOLOGY

## 2022-06-06 RX ORDER — ESTRADIOL AND LEVONORGESTREL .045; .015 MG/D; MG/D
1 PATCH TRANSDERMAL WEEKLY
Qty: 4 PATCH | Refills: 12 | Status: SHIPPED | OUTPATIENT
Start: 2022-06-06 | End: 2023-08-24 | Stop reason: SDUPTHER

## 2022-06-06 NOTE — PROGRESS NOTES
"  Subjective:       Patient ID: Sara David is a 66 y.o. female.    Chief Complaint:  Menopause    History of Present Illness:  HPI  67 y/o  presents for follow up visit. She is on HT for menopausal symptoms. She has increasing intermittent night sweats but is overall controlled on current dose of combination estrogen/progestin patch.   She continues to have monthly spotting (dark brown) and cramping. She does us the patch continuously. She has previously had a hysteroscopy/polypectomy and an EMBx for PMB. Last US done in 2019 showed EMS 4 mm with "Few small cystic foci in the lower uterine segment raising the possibility of hyperplasia or polyp.  There is also trace fluid in the endocervical canal". EMBx after that scan was benign.     She has an MRI scheduled for back pain. She is traveling for the summer to help take care of her in laws.    GYN & OB History  Patient's last menstrual period was 2020.   Date of Last Pap: No result found    OB History    Para Term  AB Living   2   0   2     SAB IAB Ectopic Multiple Live Births                  # Outcome Date GA Lbr Brice/2nd Weight Sex Delivery Anes PTL Lv   2 AB            1 AB                Review of Systems  Review of Systems   Constitutional: Negative for chills, diaphoresis, fatigue and fever.   Respiratory: Negative for cough and shortness of breath.    Cardiovascular: Negative for chest pain and palpitations.   Gastrointestinal: Negative for abdominal pain, constipation, diarrhea, nausea and vomiting.   Endocrine: Positive for hot flashes.   Genitourinary: Positive for vaginal bleeding. Negative for bladder incontinence, dyspareunia, dysuria, frequency, genital sores, hematuria, hot flashes, pelvic pain, vaginal discharge, vaginal pain, postmenopausal bleeding and vaginal dryness.   Musculoskeletal: Negative for back pain and myalgias.   Integumentary:  Negative for rash, acne, breast mass, nipple discharge and breast skin " changes.   Neurological: Negative for headaches.   Psychiatric/Behavioral: Negative for depression. The patient is not nervous/anxious.    Breast: Negative for mass, mastodynia, nipple discharge and skin changes          Objective:    Physical Exam:   Constitutional: She is oriented to person, place, and time. She appears well-developed and well-nourished. No distress.    HENT:   Head: Normocephalic and atraumatic.    Eyes: EOM are normal.    Neck: No thyromegaly present.     Pulmonary/Chest: Effort normal. She exhibits no mass and no tenderness. Right breast exhibits no inverted nipple, no mass, no nipple discharge, no skin change, no tenderness and no swelling. Left breast exhibits no inverted nipple, no mass, no nipple discharge, no skin change, no tenderness and no swelling. Breasts are symmetrical.        Abdominal: Soft. She exhibits no distension and no mass. There is no abdominal tenderness. There is no rebound and no guarding. No hernia.     Genitourinary:    Genitourinary Comments: declined             Musculoskeletal: Normal range of motion and moves all extremeties.       Neurological: She is alert and oriented to person, place, and time.    Skin: Skin is warm. No rash noted.    Psychiatric: She has a normal mood and affect. Her behavior is normal. Judgment and thought content normal.          Assessment:        1. Menopause    2. Menopausal hot flushes    3. Encounter for screening for malignant neoplasm of breast, unspecified screening modality    4. Postmenopausal bleeding    5. Encounter for screening mammogram for malignant neoplasm of breast              Plan:      Sara was seen today for menopause.    Diagnoses and all orders for this visit:    Menopause  -     estradioL-levonorgestreL (CLIMARA PRO) 0.045-0.015 mg/24 hr; Place 1 patch onto the skin once a week.    Menopausal hot flushes  -     estradioL-levonorgestreL (CLIMARA PRO) 0.045-0.015 mg/24 hr; Place 1 patch onto the skin once a  week.    Encounter for screening for malignant neoplasm of breast, unspecified screening modality  -     Mammo Digital Screening Bilat w/ Bishop; Future    Postmenopausal bleeding  -     US Pelvis Comp with Transvag NON-OB (xpd; Future    Encounter for screening mammogram for malignant neoplasm of breast   -     Mammo Digital Screening Bilat w/ Bishop; Future    Menopause symptoms currently controlled on combination patch.  Patient desires to continue at current dose.  Reviewed risks/benefits in detail.  Recurrent postmenopausal bleeding has previously been worked up with ultrasound and endometrial biopsies.  Patient declines exam today.  Recommended repeat pelvic ultrasound, which patient desires to schedule at the end of the summer after she returns from traveling to take care of family.  Precautions reviewed.  Counseled patient that ultimately we need to consider definitive surgical management with hysterectomy given recurrent postmenopausal bleeding.  She states she will consider.  All questions answered.      20 minutes of total time spent on the encounter, which includes face to face time and non-face to face time preparing to see the patient (eg, review of tests), Obtaining and/or reviewing separately obtained history, Documenting clinical information in the electronic or other health record, Independently interpreting results (not separately reported) and communicating results to the patient/family/caregiver, or Care coordination (not separately reported).      Orders Placed This Encounter   Procedures    US Pelvis Comp with Transvag NON-OB (xpd    Mammo Digital Screening Bilat w/ Bishop       No follow-ups on file.

## 2022-06-14 ENCOUNTER — PATIENT MESSAGE (OUTPATIENT)
Dept: SPORTS MEDICINE | Facility: CLINIC | Age: 67
End: 2022-06-14
Payer: MEDICARE

## 2022-06-14 ENCOUNTER — HOSPITAL ENCOUNTER (OUTPATIENT)
Dept: RADIOLOGY | Facility: HOSPITAL | Age: 67
Discharge: HOME | End: 2022-06-14
Attending: NEUROMUSCULOSKELETAL MEDICINE & OMM
Payer: MEDICARE

## 2022-06-14 DIAGNOSIS — M51.36 DDD (DEGENERATIVE DISC DISEASE), LUMBAR: ICD-10-CM

## 2022-06-14 DIAGNOSIS — M47.816 LUMBAR SPONDYLOSIS: ICD-10-CM

## 2022-06-14 DIAGNOSIS — G89.29 CHRONIC BILATERAL LOW BACK PAIN WITHOUT SCIATICA: ICD-10-CM

## 2022-06-14 DIAGNOSIS — M54.50 CHRONIC BILATERAL LOW BACK PAIN WITHOUT SCIATICA: ICD-10-CM

## 2022-06-14 PROCEDURE — 72148 MRI LUMBAR SPINE W/O DYE: CPT | Mod: 26,,, | Performed by: RADIOLOGY

## 2022-06-14 PROCEDURE — 72148 MRI LUMBAR SPINE WITHOUT CONTRAST: ICD-10-PCS | Mod: 26,,, | Performed by: RADIOLOGY

## 2022-06-14 PROCEDURE — 72148 MRI LUMBAR SPINE W/O DYE: CPT | Mod: TC

## 2022-06-15 ENCOUNTER — OFFICE VISIT (OUTPATIENT)
Dept: SPORTS MEDICINE | Facility: CLINIC | Age: 67
End: 2022-06-15
Payer: MEDICARE

## 2022-06-15 DIAGNOSIS — M47.816 LUMBAR SPONDYLOSIS: Primary | ICD-10-CM

## 2022-06-15 DIAGNOSIS — M54.50 CHRONIC BILATERAL LOW BACK PAIN WITHOUT SCIATICA: ICD-10-CM

## 2022-06-15 DIAGNOSIS — G89.29 CHRONIC BILATERAL LOW BACK PAIN WITHOUT SCIATICA: ICD-10-CM

## 2022-06-15 DIAGNOSIS — M51.36 DDD (DEGENERATIVE DISC DISEASE), LUMBAR: ICD-10-CM

## 2022-06-15 PROCEDURE — 3044F HG A1C LEVEL LT 7.0%: CPT | Mod: CPTII,95,, | Performed by: NEUROMUSCULOSKELETAL MEDICINE & OMM

## 2022-06-15 PROCEDURE — 1159F MED LIST DOCD IN RCRD: CPT | Mod: CPTII,95,, | Performed by: NEUROMUSCULOSKELETAL MEDICINE & OMM

## 2022-06-15 PROCEDURE — 99214 PR OFFICE/OUTPT VISIT, EST, LEVL IV, 30-39 MIN: ICD-10-PCS | Mod: 95,,, | Performed by: NEUROMUSCULOSKELETAL MEDICINE & OMM

## 2022-06-15 PROCEDURE — 1160F RVW MEDS BY RX/DR IN RCRD: CPT | Mod: CPTII,95,, | Performed by: NEUROMUSCULOSKELETAL MEDICINE & OMM

## 2022-06-15 PROCEDURE — 1160F PR REVIEW ALL MEDS BY PRESCRIBER/CLIN PHARMACIST DOCUMENTED: ICD-10-PCS | Mod: CPTII,95,, | Performed by: NEUROMUSCULOSKELETAL MEDICINE & OMM

## 2022-06-15 PROCEDURE — 99214 OFFICE O/P EST MOD 30 MIN: CPT | Mod: 95,,, | Performed by: NEUROMUSCULOSKELETAL MEDICINE & OMM

## 2022-06-15 PROCEDURE — 1159F PR MEDICATION LIST DOCUMENTED IN MEDICAL RECORD: ICD-10-PCS | Mod: CPTII,95,, | Performed by: NEUROMUSCULOSKELETAL MEDICINE & OMM

## 2022-06-15 PROCEDURE — 3044F PR MOST RECENT HEMOGLOBIN A1C LEVEL <7.0%: ICD-10-PCS | Mod: CPTII,95,, | Performed by: NEUROMUSCULOSKELETAL MEDICINE & OMM

## 2022-06-15 RX ORDER — CELECOXIB 200 MG/1
200 CAPSULE ORAL DAILY
Qty: 30 CAPSULE | Refills: 1 | Status: SHIPPED | OUTPATIENT
Start: 2022-06-15 | End: 2022-08-14

## 2022-06-20 ENCOUNTER — CLINICAL SUPPORT (OUTPATIENT)
Dept: REHABILITATION | Facility: OTHER | Age: 67
End: 2022-06-20
Payer: MEDICARE

## 2022-06-20 ENCOUNTER — DOCUMENTATION ONLY (OUTPATIENT)
Dept: REHABILITATION | Facility: OTHER | Age: 67
End: 2022-06-20
Payer: MEDICARE

## 2022-06-20 DIAGNOSIS — M54.50 CHRONIC BILATERAL LOW BACK PAIN WITHOUT SCIATICA: ICD-10-CM

## 2022-06-20 DIAGNOSIS — M51.36 DDD (DEGENERATIVE DISC DISEASE), LUMBAR: ICD-10-CM

## 2022-06-20 DIAGNOSIS — G89.29 CHRONIC LEFT-SIDED LOW BACK PAIN WITHOUT SCIATICA: ICD-10-CM

## 2022-06-20 DIAGNOSIS — M54.50 CHRONIC LEFT-SIDED LOW BACK PAIN WITHOUT SCIATICA: ICD-10-CM

## 2022-06-20 DIAGNOSIS — R29.898 WEAKNESS OF LEFT HIP: Primary | ICD-10-CM

## 2022-06-20 DIAGNOSIS — R68.89 DECREASED ACTIVITY TOLERANCE: ICD-10-CM

## 2022-06-20 DIAGNOSIS — M47.816 LUMBAR SPONDYLOSIS: ICD-10-CM

## 2022-06-20 DIAGNOSIS — M62.81 WEAKNESS OF TRUNK MUSCULATURE: ICD-10-CM

## 2022-06-20 DIAGNOSIS — G89.29 CHRONIC BILATERAL LOW BACK PAIN WITHOUT SCIATICA: ICD-10-CM

## 2022-06-20 PROCEDURE — 97162 PT EVAL MOD COMPLEX 30 MIN: CPT | Mod: PN | Performed by: PHYSICAL THERAPIST

## 2022-06-20 PROCEDURE — 97110 THERAPEUTIC EXERCISES: CPT | Mod: PN | Performed by: PHYSICAL THERAPIST

## 2022-06-20 NOTE — PLAN OF CARE
[facility]   Physical Therapy Initial Evaluation     Date: 6/20/2022   Name: Sara David  Clinic Number: [id number]    Therapy Diagnosis:   Encounter Diagnoses   Name Primary?    Lumbar spondylosis     DDD (degenerative disc disease), lumbar     Weakness of left hip Yes    Decreased activity tolerance     Chronic left-sided low back pain without sciatica      Physician: Lorena Guy,     Physician Orders: PT Eval and Treat   Increased core ad low back strength with HEP, NM retraining, and postural retrianing     Medical Diagnosis from Referral:   M47.816 (ICD-10-CM) - Lumbar spondylosis   M51.36 (ICD-10-CM) - DDD (degenerative disc disease), lumbar     Evaluation Date: 6/20/2022  Authorization Period Expiration: 6/15/2023  Plan of Care Expiration: 9/20/2022  Progress Note Due: 6/20/2022  Visit # / Visits authorized: 1/ 1   FOTO: 1/5; issued 6/20/2022    Precautions: Standard     Time In: 11:45 am  Time Out: 12:30 pm  Total Appointment Time (timed & untimed codes): 45 minutes      SUBJECTIVE     Date of onset: 6/15/2022    History of current condition - Elise reports: Things have progressed since last seen in Physical therapy. She had an MRI and seen by Dr. Guy. Concerned about muscle atrophy in her spine and retrolisthesis'. She will be out of town a few months to help care for her in-laws in [place] and inquiring about what exercises she can do this summer. Pain is B low back and down outside of L thigh. Has some numbness/ tingling outside of her L thigh, no symptoms extend beyond the knee. Symptoms worse with any one position too long (sitting, standing, driving). Had oral procedure last Thursday and on Tylenol for that. Was prescribed Celebrex for her back.  Also recommended a back brace but pt does not feel she is to that point yet.     Falls: none    Imaging, see chart review    Prior Therapy: yes, for hip  Social History: lives with their spouse  Prior  Level of Function: independent with ADL's, leads water aerobic classes   Current Level of Function: Difficulty with prolonged sitting or standing, yoga daily, difficulty squatting with B knee pain, able to perform floor to stand transfers    Pain:  Current 0/10, worst 3/10, best 0/10   Location: B low back, L lateral thigh   Description: numbness, superficial   Aggravating Factors: Sitting, Standing and Night Time  Easing Factors: pain medication    Patients goals: to get a stronger core     Medical History:   Past Medical History:   Diagnosis Date    Colon polyp 2008    Disorder of kidney and ureter     Endometriosis     History of melanoma in situ 4/17/2018    Left arm, s/p excision by Alexandre Gomez MD    Hypothyroidism     dx in her 40s, enlarged thyroid, did have FNA of thyroid nodule which was benign    Melanoma 1990, 2018 1990s - on back; 2018 - L upper arm    Miscarriage        Surgical History:   Sara David  has a past surgical history that includes Back surgery; [place] tooth extraction; Dilation and curettage of uterus; Salpingoophorectomy; FNA thyroid; Malignant skin lesion excision; and Exploratory laparotomy with uterine myomectomy.    Medications:   Sara has a current medication list which includes the following prescription(s): acetaminophen, celecoxib, cetirizine, vitamin d3, climara pro, levothyroxine, and trazodone.    Allergies:   Review of patient's allergies indicates:   Allergen Reactions    Iodinated contrast media           OBJECTIVE         Lumbar Range of Motion:    percentage Pain   Flexion 100%   -        Extension 75%   Painful LSP        Left Side Bending 75% -        Right Side Bending 75% -        Left rotation   50% -        Right Rotation   50% -             Lower Extremity Strength    MMT    Left  Right    Hip:  Flexion    5/5   5/5  Extension   4-/5   3+/5 (compensatory trunk extension and rotation)  Abduction   4+/5   4+/5  Adduction   5/5   5/5  External  Rotation  4+/5   5/5  Internal rotation  5/5   5/5    Knee:  Flexion    5/5   5/5  Extension   5/5   5/5      Special Tests:  -SLS: < 10 sec B  -Prone instability test: negative     Neuro Dynamic Testing:    Sciatic nerve:      SLR: R = negative     L = negative     Joint Mobility: painful to PA overpressure L4-5, hypomobility mid to lower thoracic noted  No tenderness to PA overpressure PSIS or sacrum     Palpation: TTP L TFL, Gluteus minimus     Sensation: BLE LT grossly intact    Flexibility:    Ely's test: negative B   Popliteal Angle: R = 0 degrees ; L = 0 degrees   Radha's test: (+) B      Endurance Assessment:    Table: Population Norms for TUG    Age  Average TUG    60 - 69 years  8.1 seconds    70 - 79 years  9.2 seconds    80 - 99 years  11.3 seconds        5x sit<>stand: 11 sec     Normal:   60-69: 11.4 seconds  70-79: 12.6 seconds  80-89: 12.7 seconds     30 seconds sit<>stand: 11 x     Normal:   Age Male Female   60-64 17 15   65-69 16 15   70-74 15 14   75-79 14 13   80-84 13 12   85-89 11 11   90-94 9 9         Limitation/Restriction for FOTO lumbar Survey    Therapist reviewed FOTO scores for Sara David on 6/20/2022.   FOTO documents entered into [facility] - see Media section.    Limitation Score: 40%         TREATMENT     Total Treatment time (time-based codes) separate from Evaluation: 25 minutes      Elise received the treatments listed below:      therapeutic exercises to develop strength, endurance and core stabilization for 25 minutes including:  Prone hip extension over pillow x 8--> modified to quadruped hip extension per tolerance  Bird dog- reviewed for HEP  Hip hinge x 10, demonstration and education with dowel for proper technique  Anti-rotations- reviewed for HEP  Bridging- reviewed for HEP  Dying bug- reviewed for HEP  Plank on forearms- demonstration and review for HEP      PATIENT EDUCATION AND HOME EXERCISES     Education provided:   - progression of HEP and updated handout.  Advised to contact us through phone or the portal if any problems or questions arise.     Written Home Exercises Provided: yes. Exercises were reviewed and Elise was able to demonstrate them prior to the end of the session.  Elise demonstrated good  understanding of the education provided. See EMR under Patient Instructions for exercises provided during therapy sessions.    ASSESSMENT     Sara is a 66 y.o. female referred to outpatient Physical Therapy with a medical diagnosis of DDD (degenerative disc disease), lumbar and Lumbar spondylosis. Patient presents with decreased multisegmental extension and rotation, weakness posterior chain, decreased muscular endurance, single limb balance impairment, and pain limiting performance of ADL's. Poor recruitment of gluteus lars noted.      Patient prognosis is Good.   Patient will benefit from skilled outpatient Physical Therapy to address the deficits stated above and in the chart below, provide patient /family education, and to maximize patientt's level of independence.     Plan of care discussed with patient: Yes  Patient's spiritual, cultural and educational needs considered and patient is agreeable to the plan of care and goals as stated below:     Anticipated Barriers for therapy: chronicity of condition     Medical Necessity is demonstrated by the following  History  Co-morbidities and personal factors that may impact the plan of care Co-morbidities:   none    Personal Factors:   Chronicity of LBP     moderate   Examination  Body Structures and Functions, activity limitations and participation restrictions that may impact the plan of care Body Regions:   back  lower extremities  trunk    Body Systems:    ROM  strength  balance  transfers  motor control    Participation Restrictions:   Squatting     Activity limitations:   Learning and applying knowledge  no deficits    General Tasks and Commands  no deficits    Communication  no deficits    Mobility  lifting and  carrying objects  driving (bike, car, motorcycle)    Self care  no deficits    Domestic Life  no deficits    Interactions/Relationships  no deficits    Life Areas  no deficits    Community and Social Life  community life  recreation and leisure         high   Clinical Presentation evolving clinical presentation with changing clinical characteristics moderate   Decision Making/ Complexity Score: moderate     Goals:  Short Term Goals: 6 weeks   1. Patient to report decreased pain in low back and L hip by 30% or greater with ADL's  2. Patient to be independent with home exercise program for improved self management of condition  Long Term Goals: 12 weeks   1. Patient to have decreased subjective report of disability as noted by a score of <35% on the FOTO Lumbar questionnaire   2. Patient to increased strength in B hip extension to 4+/5 or greater for improved performance of ADL's   3. Patient to demonstrate improvements in muscular endurance as noted by 14 x sit to stands in 30 sec  4. Patient to have improved balance as noted by single limb stance 10 sec or greater B for improved gait stability       PLAN   Plan of care Certification: 6/20/2022 to 9/20/2022.    Outpatient Physical Therapy 2 times weekly for 6 weeks to include the following interventions: Manual Therapy, Moist Heat/ Ice, Neuromuscular Re-ed, Patient Education, Therapeutic Activities, Therapeutic Exercise and dry needling and modalities prn.   Other: patient will be out of state and will follow up upon return    Mercedes Neri PT

## 2022-06-20 NOTE — PROGRESS NOTES
[facility] OUTPATIENT THERAPY AND WELLNESS  Physical Therapy Discharge Note- Documentation Only    Name: Sara David  Clinic Number: [id number]    Therapy Diagnosis:   Encounter Diagnoses   Name Primary?    Weakness of left hip Yes    Decreased activity tolerance     Chronic left-sided low back pain without sciatica      Physician: Lynda Wheat MD    Physician Orders: PT Eval and Treat   Medical Diagnosis from Referral: M25.551,M25.552 (ICD-10-CM) - Bilateral hip pain  Evaluation Date: 1/11/2022    Date of Last visit: 4/4/2022  Total Visits Received: 7    ASSESSMENT          Discharge reason: Other:  Patient return to physician for continued symptoms. Referred for MRI and orthopedics.     Discharge FOTO Score: 43%    Goals: not met    PLAN   This patient is discharged from Physical Therapy for medical diagnosis B hip pain. New orders entered and will evaluate under new referring provider and diagnosis on 6/20/2022.       Mercedes Neri, PT

## 2022-08-31 DIAGNOSIS — Z78.0 MENOPAUSE: ICD-10-CM

## 2022-09-14 ENCOUNTER — TELEPHONE (OUTPATIENT)
Dept: SPORTS MEDICINE | Facility: CLINIC | Age: 67
End: 2022-09-14
Payer: MEDICARE

## 2022-09-14 NOTE — TELEPHONE ENCOUNTER
LVM for the Pt asking for her to call back so that we can get her scheduled for a follow up appt. Provided call back number [phone]

## 2022-09-21 ENCOUNTER — HOSPITAL ENCOUNTER (OUTPATIENT)
Dept: RADIOLOGY | Facility: OTHER | Age: 67
Discharge: HOME | End: 2022-09-21
Attending: INTERNAL MEDICINE
Payer: MEDICARE

## 2022-09-21 DIAGNOSIS — Z78.0 MENOPAUSE: ICD-10-CM

## 2022-09-21 PROCEDURE — 77080 DEXA BONE DENSITY SPINE HIP: ICD-10-PCS | Mod: 26,,, | Performed by: RADIOLOGY

## 2022-09-21 PROCEDURE — 77080 DXA BONE DENSITY AXIAL: CPT | Mod: TC

## 2022-09-21 PROCEDURE — 77080 DXA BONE DENSITY AXIAL: CPT | Mod: 26,,, | Performed by: RADIOLOGY

## 2022-09-27 ENCOUNTER — OFFICE VISIT (OUTPATIENT)
Dept: SPORTS MEDICINE | Facility: CLINIC | Age: 67
End: 2022-09-27
Payer: MEDICARE

## 2022-09-27 VITALS
HEART RATE: 81 BPM | DIASTOLIC BLOOD PRESSURE: 70 MMHG | BODY MASS INDEX: 27.95 KG/M2 | SYSTOLIC BLOOD PRESSURE: 112 MMHG | HEIGHT: 71 IN

## 2022-09-27 DIAGNOSIS — M99.02 SOMATIC DYSFUNCTION OF THORACIC REGION: ICD-10-CM

## 2022-09-27 DIAGNOSIS — M99.03 SOMATIC DYSFUNCTION OF LUMBAR REGION: ICD-10-CM

## 2022-09-27 DIAGNOSIS — M99.05 SOMATIC DYSFUNCTION OF PELVIS REGION: ICD-10-CM

## 2022-09-27 DIAGNOSIS — M99.04 SOMATIC DYSFUNCTION OF SACRAL REGION: ICD-10-CM

## 2022-09-27 DIAGNOSIS — M99.06 SOMATIC DYSFUNCTION OF LOWER EXTREMITY: ICD-10-CM

## 2022-09-27 DIAGNOSIS — M76.31 IT BAND SYNDROME, RIGHT: ICD-10-CM

## 2022-09-27 DIAGNOSIS — M25.551 LATERAL PAIN OF RIGHT HIP: Primary | ICD-10-CM

## 2022-09-27 PROCEDURE — 3078F DIAST BP <80 MM HG: CPT | Mod: CPTII,S$GLB,, | Performed by: ORTHOPAEDIC SURGERY

## 2022-09-27 PROCEDURE — 98927 PR OSTEOPATHIC MANIP,5-6 BODY REGN: ICD-10-PCS | Mod: S$GLB,,, | Performed by: ORTHOPAEDIC SURGERY

## 2022-09-27 PROCEDURE — 1160F RVW MEDS BY RX/DR IN RCRD: CPT | Mod: CPTII,S$GLB,, | Performed by: ORTHOPAEDIC SURGERY

## 2022-09-27 PROCEDURE — 3074F PR MOST RECENT SYSTOLIC BLOOD PRESSURE < 130 MM HG: ICD-10-PCS | Mod: CPTII,S$GLB,, | Performed by: ORTHOPAEDIC SURGERY

## 2022-09-27 PROCEDURE — 99214 OFFICE O/P EST MOD 30 MIN: CPT | Mod: 25,GC,S$GLB, | Performed by: ORTHOPAEDIC SURGERY

## 2022-09-27 PROCEDURE — 3074F SYST BP LT 130 MM HG: CPT | Mod: CPTII,S$GLB,, | Performed by: ORTHOPAEDIC SURGERY

## 2022-09-27 PROCEDURE — 1101F PT FALLS ASSESS-DOCD LE1/YR: CPT | Mod: CPTII,S$GLB,, | Performed by: ORTHOPAEDIC SURGERY

## 2022-09-27 PROCEDURE — 1159F PR MEDICATION LIST DOCUMENTED IN MEDICAL RECORD: ICD-10-PCS | Mod: CPTII,S$GLB,, | Performed by: ORTHOPAEDIC SURGERY

## 2022-09-27 PROCEDURE — 3008F BODY MASS INDEX DOCD: CPT | Mod: CPTII,S$GLB,, | Performed by: ORTHOPAEDIC SURGERY

## 2022-09-27 PROCEDURE — 99214 PR OFFICE/OUTPT VISIT, EST, LEVL IV, 30-39 MIN: ICD-10-PCS | Mod: 25,GC,S$GLB, | Performed by: ORTHOPAEDIC SURGERY

## 2022-09-27 PROCEDURE — 99999 PR PBB SHADOW E&M-EST. PATIENT-LVL III: CPT | Mod: PBBFAC,,, | Performed by: ORTHOPAEDIC SURGERY

## 2022-09-27 PROCEDURE — 1159F MED LIST DOCD IN RCRD: CPT | Mod: CPTII,S$GLB,, | Performed by: ORTHOPAEDIC SURGERY

## 2022-09-27 PROCEDURE — 1125F AMNT PAIN NOTED PAIN PRSNT: CPT | Mod: CPTII,S$GLB,, | Performed by: ORTHOPAEDIC SURGERY

## 2022-09-27 PROCEDURE — 1101F PR PT FALLS ASSESS DOC 0-1 FALLS W/OUT INJ PAST YR: ICD-10-PCS | Mod: CPTII,S$GLB,, | Performed by: ORTHOPAEDIC SURGERY

## 2022-09-27 PROCEDURE — 3008F PR BODY MASS INDEX (BMI) DOCUMENTED: ICD-10-PCS | Mod: CPTII,S$GLB,, | Performed by: ORTHOPAEDIC SURGERY

## 2022-09-27 PROCEDURE — 99999 PR PBB SHADOW E&M-EST. PATIENT-LVL III: ICD-10-PCS | Mod: PBBFAC,,, | Performed by: ORTHOPAEDIC SURGERY

## 2022-09-27 PROCEDURE — 1160F PR REVIEW ALL MEDS BY PRESCRIBER/CLIN PHARMACIST DOCUMENTED: ICD-10-PCS | Mod: CPTII,S$GLB,, | Performed by: ORTHOPAEDIC SURGERY

## 2022-09-27 PROCEDURE — 98927 OSTEOPATH MANJ 5-6 REGIONS: CPT | Mod: S$GLB,,, | Performed by: ORTHOPAEDIC SURGERY

## 2022-09-27 PROCEDURE — 3078F PR MOST RECENT DIASTOLIC BLOOD PRESSURE < 80 MM HG: ICD-10-PCS | Mod: CPTII,S$GLB,, | Performed by: ORTHOPAEDIC SURGERY

## 2022-09-27 PROCEDURE — 3288F FALL RISK ASSESSMENT DOCD: CPT | Mod: CPTII,S$GLB,, | Performed by: ORTHOPAEDIC SURGERY

## 2022-09-27 PROCEDURE — 1125F PR PAIN SEVERITY QUANTIFIED, PAIN PRESENT: ICD-10-PCS | Mod: CPTII,S$GLB,, | Performed by: ORTHOPAEDIC SURGERY

## 2022-09-27 PROCEDURE — 3044F PR MOST RECENT HEMOGLOBIN A1C LEVEL <7.0%: ICD-10-PCS | Mod: CPTII,S$GLB,, | Performed by: ORTHOPAEDIC SURGERY

## 2022-09-27 PROCEDURE — 3044F HG A1C LEVEL LT 7.0%: CPT | Mod: CPTII,S$GLB,, | Performed by: ORTHOPAEDIC SURGERY

## 2022-09-27 PROCEDURE — 3288F PR FALLS RISK ASSESSMENT DOCUMENTED: ICD-10-PCS | Mod: CPTII,S$GLB,, | Performed by: ORTHOPAEDIC SURGERY

## 2022-09-27 RX ORDER — MELOXICAM 7.5 MG/1
7.5 TABLET ORAL DAILY
Qty: 30 TABLET | Refills: 0 | Status: SHIPPED | OUTPATIENT
Start: 2022-09-27 | End: 2023-01-12

## 2022-09-27 RX ORDER — METHYLPREDNISOLONE 4 MG/1
TABLET ORAL
Qty: 21 EACH | Refills: 0 | Status: SHIPPED | OUTPATIENT
Start: 2022-09-27 | End: 2022-11-03 | Stop reason: ALTCHOICE

## 2022-09-27 NOTE — PROGRESS NOTES
CC: right hip pain    67 y.o. Female presents today for evaluation of her right hip pain. This is acute on chronic, exacerbated over the summer in July while hiking after years of not doing so. Pain is over anterior and lateral hip and will occasionally radiate down lateral right leg just beyond knee. She denies back pain radiation, weakness, numbness, or tingling. Described as deep ache and is worse with hip extension loading. She has grappled with IT band syndrome on this side before and endorses slacking off on her usual consistent IT band rolling exercises. She states ASA and NSAIDs have helped. She denies recent infections, swelling, skin changes.     How long: subacute, 3 months  What makes it better: rest, stretching  What makes it worse: loading and exertion through hip  Does it radiate: yes, distally down lateral thigh  Attempted treatments: oral NSAIDs  Pain score: 7/10  Any mechanical symptoms: Denies  Feelings of instability: None  Affecting ADLs: Yes        PAST MEDICAL HISTORY:   Past Medical History:   Diagnosis Date    Colon polyp 2008    Disorder of kidney and ureter     Endometriosis     History of melanoma in situ 4/17/2018    Left arm, s/p excision by Alexandre Gomez MD    Hypothyroidism     dx in her 40s, enlarged thyroid, did have FNA of thyroid nodule which was benign    Melanoma 1990, 2018 1990s - on back; 2018 - L upper arm    Miscarriage        PAST SURGICAL HISTORY:   Past Surgical History:   Procedure Laterality Date    BACK SURGERY      DILATION AND CURETTAGE OF UTERUS      History of polyps    EXPLORATORY LAPAROTOMY WITH UTERINE MYOMECTOMY      FNA thyroid      in her 40s    MALIGNANT SKIN LESION EXCISION      1990s, 2018    SALPINGOOPHORECTOMY      WISDOM TOOTH EXTRACTION         FAMILY HISTORY:   Family History   Problem Relation Age of Onset    Hypertension Father     Breast cancer Sister     Bone cancer Sister     Parkinsonism Sister     Multiple sclerosis Sister     Lymphoma  "Maternal Aunt     Arthritis Mother     Chronic back pain Brother     Colon cancer Neg Hx     Diabetes Neg Hx     Miscarriages / Stillbirths Neg Hx     Stroke Neg Hx     Melanoma Neg Hx        SOCIAL HISTORY:   Social History     Socioeconomic History    Marital status:    Tobacco Use    Smoking status: Never    Smokeless tobacco: Never   Substance and Sexual Activity    Alcohol use: Yes     Comment: 2-3 monthly    Drug use: Yes     Types: Marijuana    Sexual activity: Yes     Partners: Male   Other Topics Concern    Are you pregnant or think you may be? No    Breast-feeding No   Social History Narrative    Doing water aerobic three times weekly. Also walks.        MEDICATIONS:     Current Outpatient Medications:     acetaminophen (TYLENOL) 500 MG tablet, Take 500 mg by mouth every evening., Disp: , Rfl:     cetirizine (ZYRTEC) 10 MG tablet, Take 10 mg by mouth once daily., Disp: , Rfl:     cholecalciferol, vitamin D3, (VITAMIN D3) 100 mcg (4,000 unit) Cap, Take 1 capsule by mouth., Disp: , Rfl:     estradioL-levonorgestreL (CLIMARA PRO) 0.045-0.015 mg/24 hr, Place 1 patch onto the skin once a week., Disp: 4 patch, Rfl: 12    levothyroxine (SYNTHROID) 88 MCG tablet, Take 1 tablet (88 mcg total) by mouth before breakfast., Disp: 90 tablet, Rfl: 3    traZODone (DESYREL) 50 MG tablet, TAKE 1 TABLET(50 MG) BY MOUTH NIGHTLY AS NEEDED FOR INSOMNIA, Disp: 90 tablet, Rfl: 3    meloxicam (MOBIC) 7.5 MG tablet, Take 1 tablet (7.5 mg total) by mouth once daily., Disp: 30 tablet, Rfl: 0    methylPREDNISolone (MEDROL DOSEPACK) 4 mg tablet, use as directed, Disp: 21 each, Rfl: 0    ALLERGIES:   Review of patient's allergies indicates:   Allergen Reactions    Iodinated contrast media         PHYSICAL EXAMINATION:  /70   Pulse 81   Ht 5' 11" (1.803 m)   LMP 01/17/2020   BMI 27.95 kg/m²   Vitals signs and nursing note have been reviewed.  General: In no acute distress, well developed, well nourished, no " diaphoresis  Eyes: EOM full and smooth, no eye redness or discharge  HENT: normocephalic and atraumatic, neck supple, trachea midline, no nasal discharge, no external ear redness or discharge  Cardiovascular: no LE edema  Lungs: respirations non-labored, no conversational dyspnea   Abd: non-distended, no rigidity  MSK: no amputation or deformity, no swelling of extremities  Neuro: AAOx3, CN2-12 grossly intact  Skin: No rashes, warm and dry  Psychiatric: cooperative, pleasant, mood and affect appropriate for age    HIP: RIGHT  The affected hip is compared to the contralateral hip.    Observation:    There is no edema, erythema, or ecchymosis in the lumbosacral region.   No obvious pelvic obliquity while standing.    No thoracolumbar scoliosis observed.    No midline skin abnormalities.  No atrophy noted in the lower limbs.  Antalgic gait is noted.     ROM:   Passive hip flexion to 120° on left and 120° on right.    Passive hip internal rotation to 45° on left and 45° on right.    Passive hip external rotation to 45° on left and 45° on right.    Passive hip abduction to 45° on left and 45° on right.    All motions above are without pain.  There is pain at TFL and inferior to GTB with active hip abduction.     Tenderness To Palpation:  No tenderness at the ASIS, AIIS, PSIS, PIIS, iliac crest, pubic bones, ischial tuberosity.  There is tenderness to palpation at right sacral base and SIJ, as well as piriformis, IT band, and gluteus medius tendon and muscle belly.   No tenderness directly over the greater trochanteric bursa or greater/lesser trochanters though there is tenderness adjacent to it both superiorly and inferiorly.   No tenderness over hip flexor musculature.    Strength Testing:  Hip flexion - 5/5 on left and 5/5 on right  Hip extension - 5/5 on left and 5/5 on right  Hip adduction - 5/5 on left and 5/5 on right  Hip abduction - 5/5 on left and 5/5 on right  Knee flexion - 5/5 on left and 5/5 on right  Knee  extension - 5/5 on left and 5/5 on right    Special Tests:  Standing Trendelenburg test - negative    Hamstring flexibility symmetric  Quadriceps flexibility symmetric.    Log roll - negative  ROXANNE - positive  FADIR - negative  Scour test - negative  No pain with posterior hip capsule compression    Gaenslen's test - positive    ASIS compression test - negative  SI drawer test - negative   Thigh thrust test - negative     Radha test - positive  Matthew compression test - positive    Posture:  Upright  Gait: Right antalgic     TART (Tissue texture abnormality, Asymmetry,  Restriction of motion and/or Tenderness) changes:    Head:      Cervical Spine  Thoracic Spine  Lumbar Spine   C1  T1  L1 Neutral   C2  T2  L2 FRSR   C3  T3  L3 FRSR   C4  T4  L4 Neutral   C5  T5 Neutral L5 Neutral   C6  T6 Neutral     C7  T7 Neutral       T8 TTAB       T9 TTAB       T10 TTAB       T11 TTAB       T12 Neutral       Ribs:    Upper Extremity:    Abdomen:    Pelvis:  Innominate:Right superior shear  Pubic bone:Right superior pubic shear    Sacrum:Left on Left sacral torsion    Lower Extremity:  Fascial trigger band along the anterior aspect of the right IT band  Myofascial restriction right lateral thigh    Key   F= Flexed   E = Extended   R = Rotated   N = Neutral   S = Sidebent   TTA = tissue texture abnormality   L/R/B = left/right/bilateral (last letter)         Neurovascular Exam:  Normal gait without Trendelenburg.  2+ femoral, DP, and PT pulses BL.  No skin changes, no abnormal hair distribution.  Sensation intact to light touch throughout the obturator and medial/lateral/posterior femoral cutaneous nerves.  Capillary refill intact to <2 seconds in all lower limb digits.      IMAGIN. Previous pelvis xrays reviewed from 2021 with bilateral hip degenerative changes noted.       ASSESSMENT:      ICD-10-CM ICD-9-CM   1. Lateral pain of right hip  M25.551 719.45   2. It band syndrome, right  M76.31 728.89   3. Somatic  dysfunction of lumbar region  M99.03 739.3   4. Somatic dysfunction of pelvis region  M99.05 739.5   5. Somatic dysfunction of lower extremity  M99.06 739.6   6. Somatic dysfunction of sacral region  M99.04 739.4   7. Somatic dysfunction of thoracic region  M99.02 739.2         PLAN:  1-2.  Lateral right hip pain/IT band syndrome - new to provider    - Mrs. Olivares presents with 3 months of right sided hip pain and mechanical dysfunction that is multi-factorial in etiology and is consistent with IT band syndrome, gluteus medius tendonitis, right SIJ dysfunction, and hip girdle myofascial pain. She exhibits gluteus medius weakness ipsilaterally with overtly positive Obers and exquisite tenderness along entire IT band. She has responded well to OMT and HEP with IT foam roller in the past but has recently become less compliant with her HEP.     - Based on her description/body language of pain and somatic dysfunction identified on exam, I discussed osteopathic manipulation as a treatment option today.  She consents to evaluation and treatment.  See below.    - She relates that she will soon be visiting some friends in [place] and may be partaking in some more strenuous activities given her surroundings. We will prescribe her a medrol dose pack to mitigate the pain and inflammation at the hip stabilizers and also provide her with some Mobic to use on a PRN basis based on symptom severity after completion of the medrol dose pack.     - Meloxicam 7.5 mg daily as needed.    - Medrol dose pack x 5 days prescribed    - HEP for gluteus dysfunction, IT band, SIJ dysfunction prescribed today. Handouts provided, explained, and exercises were demonstrated as needed. Encouraged to do daily.       3-7. Somatic dysfunction lumbar, pelvis, sacral, thoracic, lower extremity regions -     - OMT 5-6 regions. Oral consent obtained. Reviewed benefits and potential side effects. OMT indicated today due to signs and symptoms as well as local  and remote somatic dysfunction findings and their related neurokinetic, lymphatic, fascial and/or arteriovenous body connections. OMT techniques used: Soft Tissue, Myofascial Release, Muscle Energy, and Fascial Distortion Model. Treatment was tolerated well. Improvement noted in segmental mobility post-treatment in dysfunctional regions. There were no adverse events and no complications immediately following treatment. Advised plenty of water to help alleviate soreness.      - Future planning includes - GTB or gluteus medius fabiola-tendonous injection, possibly more OMT if helpful and if indicated, add physical therapy    - All questions were answered to the best of my ability and all concerns were addressed at this time.    - Follow up in 5-6 weeks for above, or sooner if needed.      This note is dictated using the M*Modal Fluency Direct word recognition program. There are word recognition mistakes that are occasionally missed on review.      Total time spent face-to face with patient counseling or coordinating care including prognosis, differential diagnosis, risks and benefits of treatment, instructions, compliance risk reductions as well as non-face-to-face time personally spent reviewing medial record, medical documentation, and coordination of care.     EST MINUTES X   00244 10-19    [id number] 20-29    24306 30-39 X   [id number] 40-54    NEW     54088 15-29    83172 30-44    34022 45-59    57875 60-74    PHONE      5-10    [id number] 11-20    24160 21-30      Resident/fellow attestation: I, Dr. Guy, performed the service or was physically present during the key or critical portions of the service when performed by the resident; and the participation of the teaching physician in the management of the patient.

## 2022-10-03 ENCOUNTER — HOSPITAL ENCOUNTER (OUTPATIENT)
Dept: RADIOLOGY | Facility: OTHER | Age: 67
Discharge: HOME | End: 2022-10-03
Attending: OBSTETRICS & GYNECOLOGY
Payer: MEDICARE

## 2022-10-03 DIAGNOSIS — Z12.31 ENCOUNTER FOR SCREENING MAMMOGRAM FOR MALIGNANT NEOPLASM OF BREAST: ICD-10-CM

## 2022-10-03 DIAGNOSIS — N95.0 POSTMENOPAUSAL BLEEDING: ICD-10-CM

## 2022-10-03 DIAGNOSIS — Z12.39 ENCOUNTER FOR SCREENING FOR MALIGNANT NEOPLASM OF BREAST, UNSPECIFIED SCREENING MODALITY: ICD-10-CM

## 2022-10-03 PROCEDURE — 76830 US PELVIS COMP WITH TRANSVAG NON-OB (XPD): ICD-10-PCS | Mod: 26,,, | Performed by: RADIOLOGY

## 2022-10-03 PROCEDURE — 76830 TRANSVAGINAL US NON-OB: CPT | Mod: 26,,, | Performed by: RADIOLOGY

## 2022-10-03 PROCEDURE — 77067 SCR MAMMO BI INCL CAD: CPT | Mod: TC

## 2022-10-03 PROCEDURE — 77067 SCR MAMMO BI INCL CAD: CPT | Mod: 26,,, | Performed by: INTERNAL MEDICINE

## 2022-10-03 PROCEDURE — 77063 BREAST TOMOSYNTHESIS BI: CPT | Mod: 26,,, | Performed by: INTERNAL MEDICINE

## 2022-10-03 PROCEDURE — 77067 MAMMO DIGITAL SCREENING BILAT WITH TOMO: ICD-10-PCS | Mod: 26,,, | Performed by: INTERNAL MEDICINE

## 2022-10-03 PROCEDURE — 77063 MAMMO DIGITAL SCREENING BILAT WITH TOMO: ICD-10-PCS | Mod: 26,,, | Performed by: INTERNAL MEDICINE

## 2022-10-03 PROCEDURE — 76856 US EXAM PELVIC COMPLETE: CPT | Mod: 26,,, | Performed by: RADIOLOGY

## 2022-10-03 PROCEDURE — 76856 US PELVIS COMP WITH TRANSVAG NON-OB (XPD): ICD-10-PCS | Mod: 26,,, | Performed by: RADIOLOGY

## 2022-10-03 PROCEDURE — 76830 TRANSVAGINAL US NON-OB: CPT | Mod: TC

## 2022-10-03 PROCEDURE — 77063 BREAST TOMOSYNTHESIS BI: CPT | Mod: TC

## 2022-10-05 ENCOUNTER — PATIENT MESSAGE (OUTPATIENT)
Dept: OBSTETRICS AND GYNECOLOGY | Facility: CLINIC | Age: 67
End: 2022-10-05
Payer: MEDICARE

## 2022-10-13 ENCOUNTER — PATIENT MESSAGE (OUTPATIENT)
Dept: OBSTETRICS AND GYNECOLOGY | Facility: CLINIC | Age: 67
End: 2022-10-13
Payer: MEDICARE

## 2022-11-02 ENCOUNTER — TELEPHONE (OUTPATIENT)
Dept: SPORTS MEDICINE | Facility: CLINIC | Age: 67
End: 2022-11-02
Payer: MEDICARE

## 2022-11-02 NOTE — TELEPHONE ENCOUNTER
LM returning pt's call. Callback number [phone].    Sagrario Perez MS, OTC  Clinical Assistant to Dr. Lorena Guy

## 2022-11-02 NOTE — TELEPHONE ENCOUNTER
----- Message from Tiffany aMhan sent at 11/2/2022  3:11 PM CDT -----  Regarding: PT'S RTEQUESTING A CALL BACK REGARDING PT ORDERS  Contact: pt  Pt would like a call back from staff...    Confirmed contact info below:  Contact Name: Sara David  Phone Number: [phone]

## 2022-11-02 NOTE — TELEPHONE ENCOUNTER
Spoke to the Pt about her questions for us. She stated that the Dr was going to put in an order for PT and when she arrived back in town, there was no order for her.  She was asking about getting another one and also to make a follow up appt for her increase R hip pain.  We made the appt for katina 11/3. Pt confirmed details.

## 2022-11-03 ENCOUNTER — OFFICE VISIT (OUTPATIENT)
Dept: SPORTS MEDICINE | Facility: CLINIC | Age: 67
End: 2022-11-03
Payer: MEDICARE

## 2022-11-03 VITALS
SYSTOLIC BLOOD PRESSURE: 123 MMHG | DIASTOLIC BLOOD PRESSURE: 70 MMHG | WEIGHT: 200 LBS | HEIGHT: 71 IN | BODY MASS INDEX: 28 KG/M2

## 2022-11-03 DIAGNOSIS — M25.551 LATERAL PAIN OF RIGHT HIP: Primary | ICD-10-CM

## 2022-11-03 DIAGNOSIS — M70.61 GREATER TROCHANTERIC BURSITIS OF RIGHT HIP: ICD-10-CM

## 2022-11-03 DIAGNOSIS — M76.31 IT BAND SYNDROME, RIGHT: ICD-10-CM

## 2022-11-03 PROCEDURE — 3008F PR BODY MASS INDEX (BMI) DOCUMENTED: ICD-10-PCS | Mod: CPTII,S$GLB,, | Performed by: ORTHOPAEDIC SURGERY

## 2022-11-03 PROCEDURE — 1125F AMNT PAIN NOTED PAIN PRSNT: CPT | Mod: CPTII,S$GLB,, | Performed by: ORTHOPAEDIC SURGERY

## 2022-11-03 PROCEDURE — 3288F FALL RISK ASSESSMENT DOCD: CPT | Mod: CPTII,S$GLB,, | Performed by: ORTHOPAEDIC SURGERY

## 2022-11-03 PROCEDURE — 3078F PR MOST RECENT DIASTOLIC BLOOD PRESSURE < 80 MM HG: ICD-10-PCS | Mod: CPTII,S$GLB,, | Performed by: ORTHOPAEDIC SURGERY

## 2022-11-03 PROCEDURE — 1101F PR PT FALLS ASSESS DOC 0-1 FALLS W/OUT INJ PAST YR: ICD-10-PCS | Mod: CPTII,S$GLB,, | Performed by: ORTHOPAEDIC SURGERY

## 2022-11-03 PROCEDURE — 3044F PR MOST RECENT HEMOGLOBIN A1C LEVEL <7.0%: ICD-10-PCS | Mod: CPTII,S$GLB,, | Performed by: ORTHOPAEDIC SURGERY

## 2022-11-03 PROCEDURE — 3288F PR FALLS RISK ASSESSMENT DOCUMENTED: ICD-10-PCS | Mod: CPTII,S$GLB,, | Performed by: ORTHOPAEDIC SURGERY

## 2022-11-03 PROCEDURE — 3078F DIAST BP <80 MM HG: CPT | Mod: CPTII,S$GLB,, | Performed by: ORTHOPAEDIC SURGERY

## 2022-11-03 PROCEDURE — 1125F PR PAIN SEVERITY QUANTIFIED, PAIN PRESENT: ICD-10-PCS | Mod: CPTII,S$GLB,, | Performed by: ORTHOPAEDIC SURGERY

## 2022-11-03 PROCEDURE — 1160F PR REVIEW ALL MEDS BY PRESCRIBER/CLIN PHARMACIST DOCUMENTED: ICD-10-PCS | Mod: CPTII,S$GLB,, | Performed by: ORTHOPAEDIC SURGERY

## 2022-11-03 PROCEDURE — 99999 PR PBB SHADOW E&M-EST. PATIENT-LVL III: CPT | Mod: PBBFAC,,, | Performed by: ORTHOPAEDIC SURGERY

## 2022-11-03 PROCEDURE — 1159F PR MEDICATION LIST DOCUMENTED IN MEDICAL RECORD: ICD-10-PCS | Mod: CPTII,S$GLB,, | Performed by: ORTHOPAEDIC SURGERY

## 2022-11-03 PROCEDURE — 3074F SYST BP LT 130 MM HG: CPT | Mod: CPTII,S$GLB,, | Performed by: ORTHOPAEDIC SURGERY

## 2022-11-03 PROCEDURE — 1101F PT FALLS ASSESS-DOCD LE1/YR: CPT | Mod: CPTII,S$GLB,, | Performed by: ORTHOPAEDIC SURGERY

## 2022-11-03 PROCEDURE — 99214 OFFICE O/P EST MOD 30 MIN: CPT | Mod: 25,S$GLB,, | Performed by: ORTHOPAEDIC SURGERY

## 2022-11-03 PROCEDURE — 1160F RVW MEDS BY RX/DR IN RCRD: CPT | Mod: CPTII,S$GLB,, | Performed by: ORTHOPAEDIC SURGERY

## 2022-11-03 PROCEDURE — 3074F PR MOST RECENT SYSTOLIC BLOOD PRESSURE < 130 MM HG: ICD-10-PCS | Mod: CPTII,S$GLB,, | Performed by: ORTHOPAEDIC SURGERY

## 2022-11-03 PROCEDURE — 99999 PR PBB SHADOW E&M-EST. PATIENT-LVL III: ICD-10-PCS | Mod: PBBFAC,,, | Performed by: ORTHOPAEDIC SURGERY

## 2022-11-03 PROCEDURE — 20611 DRAIN/INJ JOINT/BURSA W/US: CPT | Mod: RT,S$GLB,, | Performed by: ORTHOPAEDIC SURGERY

## 2022-11-03 PROCEDURE — 3008F BODY MASS INDEX DOCD: CPT | Mod: CPTII,S$GLB,, | Performed by: ORTHOPAEDIC SURGERY

## 2022-11-03 PROCEDURE — 1159F MED LIST DOCD IN RCRD: CPT | Mod: CPTII,S$GLB,, | Performed by: ORTHOPAEDIC SURGERY

## 2022-11-03 PROCEDURE — 20611 PR DRAIN/ASP/INJECT MAJOR JOINT/BURSA W/US GUIDANCE: ICD-10-PCS | Mod: RT,S$GLB,, | Performed by: ORTHOPAEDIC SURGERY

## 2022-11-03 PROCEDURE — 3044F HG A1C LEVEL LT 7.0%: CPT | Mod: CPTII,S$GLB,, | Performed by: ORTHOPAEDIC SURGERY

## 2022-11-03 PROCEDURE — 99214 PR OFFICE/OUTPT VISIT, EST, LEVL IV, 30-39 MIN: ICD-10-PCS | Mod: 25,S$GLB,, | Performed by: ORTHOPAEDIC SURGERY

## 2022-11-03 RX ORDER — TRIAMCINOLONE ACETONIDE 40 MG/ML
40 INJECTION, SUSPENSION INTRA-ARTICULAR; INTRAMUSCULAR
Status: COMPLETED | OUTPATIENT
Start: 2022-11-03 | End: 2022-11-03

## 2022-11-03 RX ADMIN — TRIAMCINOLONE ACETONIDE 40 MG: 40 INJECTION, SUSPENSION INTRA-ARTICULAR; INTRAMUSCULAR at 03:11

## 2022-11-03 NOTE — PROGRESS NOTES
CC: right hip pain    Elise is here today for follow up evaluation of her right hip pain. She reports her pain is 7/10 today. She admits to improved pain with Medrol Dosepack but feels as though her pain has increased since her last visit without new mechanism of injury. When asked where she hurts today she gestures to the lateral aspect of the right hip.     Recall from visit on 09/27/2022  67 y.o. Female presents today for evaluation of her right hip pain. This is acute on chronic, exacerbated over the summer in July while hiking after years of not doing so. Pain is over anterior and lateral hip and will occasionally radiate down lateral right leg just beyond knee. She denies back pain radiation, weakness, numbness, or tingling. Described as deep ache and is worse with hip extension loading. She has grappled with IT band syndrome on this side before and endorses slacking off on her usual consistent IT band rolling exercises. She states ASA and NSAIDs have helped. She denies recent infections, swelling, skin changes.   How long: subacute, 3 months  What makes it better: rest, stretching  What makes it worse: loading and exertion through hip  Does it radiate: yes, distally down lateral thigh  Attempted treatments: oral NSAIDs  Pain score: 7/10  Any mechanical symptoms: Denies  Feelings of instability: None  Affecting ADLs: Yes      PAST MEDICAL HISTORY:   Past Medical History:   Diagnosis Date    Colon polyp 2008    Disorder of kidney and ureter     Endometriosis     History of melanoma in situ 4/17/2018    Left arm, s/p excision by Alexandre Gomez MD    Hypothyroidism     dx in her 40s, enlarged thyroid, did have FNA of thyroid nodule which was benign    Melanoma 1990, 2018 1990s - on back; 2018 - L upper arm    Miscarriage        PAST SURGICAL HISTORY:   Past Surgical History:   Procedure Laterality Date    BACK SURGERY      DILATION AND CURETTAGE OF UTERUS      History of polyps    EXPLORATORY LAPAROTOMY WITH  UTERINE MYOMECTOMY      FNA thyroid      in her 40s    MALIGNANT SKIN LESION EXCISION      1990s, 2018    SALPINGOOPHORECTOMY      WISDOM TOOTH EXTRACTION         FAMILY HISTORY:   Family History   Problem Relation Age of Onset    Hypertension Father     Breast cancer Sister     Bone cancer Sister     Parkinsonism Sister     Multiple sclerosis Sister     Lymphoma Maternal Aunt     Arthritis Mother     Chronic back pain Brother     Colon cancer Neg Hx     Diabetes Neg Hx     Miscarriages / Stillbirths Neg Hx     Stroke Neg Hx     Melanoma Neg Hx        SOCIAL HISTORY:   Social History     Socioeconomic History    Marital status:    Tobacco Use    Smoking status: Never    Smokeless tobacco: Never   Substance and Sexual Activity    Alcohol use: Yes     Comment: 2-3 monthly    Drug use: Yes     Types: Marijuana    Sexual activity: Yes     Partners: Male   Other Topics Concern    Are you pregnant or think you may be? No    Breast-feeding No   Social History Narrative    Doing water aerobic three times weekly. Also walks.        MEDICATIONS:     Current Outpatient Medications:     acetaminophen (TYLENOL) 500 MG tablet, Take 500 mg by mouth every evening., Disp: , Rfl:     cetirizine (ZYRTEC) 10 MG tablet, Take 10 mg by mouth once daily., Disp: , Rfl:     cholecalciferol, vitamin D3, (VITAMIN D3) 100 mcg (4,000 unit) Cap, Take 1 capsule by mouth., Disp: , Rfl:     estradioL-levonorgestreL (CLIMARA PRO) 0.045-0.015 mg/24 hr, Place 1 patch onto the skin once a week., Disp: 4 patch, Rfl: 12    levothyroxine (SYNTHROID) 88 MCG tablet, Take 1 tablet (88 mcg total) by mouth before breakfast., Disp: 90 tablet, Rfl: 3    meloxicam (MOBIC) 7.5 MG tablet, Take 1 tablet (7.5 mg total) by mouth once daily., Disp: 30 tablet, Rfl: 0    traZODone (DESYREL) 50 MG tablet, TAKE 1 TABLET(50 MG) BY MOUTH NIGHTLY AS NEEDED FOR INSOMNIA, Disp: 90 tablet, Rfl: 3    ALLERGIES:   Review of patient's allergies indicates:   Allergen Reactions  "   Iodinated contrast media         PHYSICAL EXAMINATION:  /70   Ht 5' 11" (1.803 m)   Wt 90.7 kg (200 lb)   LMP 01/17/2020   BMI 27.89 kg/m²   Vitals signs and nursing note have been reviewed.  General: In no acute distress, well developed, well nourished, no diaphoresis  Eyes: EOM full and smooth, no eye redness or discharge  HENT: normocephalic and atraumatic, neck supple, trachea midline, no nasal discharge, no external ear redness or discharge  Cardiovascular: no LE edema  Lungs: respirations non-labored, no conversational dyspnea   Abd: non-distended, no rigidity  MSK: no amputation or deformity, no swelling of extremities  Neuro: AAOx3, CN2-12 grossly intact  Skin: No rashes, warm and dry  Psychiatric: cooperative, pleasant, mood and affect appropriate for age    HIP: RIGHT  The affected hip is compared to the contralateral hip.    Observation:    There is no edema, erythema, or ecchymosis in the lumbosacral region.   No obvious pelvic obliquity while standing.    No thoracolumbar scoliosis observed.    No midline skin abnormalities.  No atrophy noted in the lower limbs.  Antalgic gait is noted.     ROM:   Passive hip flexion to 120° on left and 120° on right.    Passive hip internal rotation to 45° on left and 45° on right.    Passive hip external rotation to 45° on left and 45° on right.    Passive hip abduction to 45° on left and 45° on right.    All motions above are without pain.  There is pain at TFL and inferior to GTB with active hip abduction.     Tenderness To Palpation:  No tenderness at the ASIS, AIIS, PSIS, PIIS, iliac crest, pubic bones, ischial tuberosity.  + tenderness directly over the greater trochanter  + tenderness over the glute attachments on the greater trochanter.  No tenderness over hip flexor musculature.    Strength Testing:  Hip flexion - 5/5 on left and 5/5 on right  Hip extension - 5/5 on left and 5/5 on right  Hip adduction - 5/5 on left and 5/5 on right  Hip abduction " - 5/5 on left and 5/5 on right  Knee flexion - 5/5 on left and 5/5 on right  Knee extension - 5/5 on left and 5/5 on right    Special Tests:  Log roll - negative  ROXANNE - positive pain at lateral hip  FADIR - negative  Scour test - negative  No pain with posterior hip capsule compression    Gaenslen's test - positive    ASIS compression test - negative  SI drawer test - negative   Thigh thrust test - negative     Radha test - positive  Matthew compression test - positive      Neurovascular Exam:  Normal gait without Trendelenburg.  2+ femoral, DP, and PT pulses BL.  No skin changes, no abnormal hair distribution.  Sensation intact to light touch throughout the obturator and medial/lateral/posterior femoral cutaneous nerves.  Capillary refill intact to <2 seconds in all lower limb digits.      IMAGIN. X-ray ordered due to bilateral low back pain  2. X-ray images were reviewed personally by me and then directly with patient.  3. FINDINGS: X-ray images obtained demonstrate mild curvature of the lumbar spine.  The vertebral bodies are normal in height.  Minimal retrolisthesis present at L3-4.  Remaining vertebral bodies are normally aligned.  There is multilevel disc space narrowing and mild osteophytic spurring along vertebral endplates.  There are facet degenerative changes.  There is no significant instability with flexion or extension.  4. IMPRESSION:  Multilevel lumbar spondylosis and DDD with minimal retrolisthesis present at L3-4. There is no significant instability with flexion or extension.      PROCEDURE:  Large Joint Aspiration/Injection  Greater trochanteric bursa, right  Performed by: DEDE SANTANA  Authorized by: DEDE SANTANA   Consent Done?: Yes (Verbal and written)  Indications: Pain  Site marked: The procedure site was marked   Timeout: Prior to procedure the correct patient, procedure, and site was verified   Location: Greater trochanteric bursa, right  Prep: Patient was prepped and draped in  usual sterile fashion   Ultrasonic Guidance for needle placement: yes  Needle size: 22G, 3.5  Procedure: After skin anesthetic was applied, the needle was used to enter the greater trochanteric bursa under US guidance. A 3 cc mixture of 1 cc of 40 mg/ml triamcinolone acetonide and 2 cc of 0.2% ropivacaine was injected into the bursa.    Approach: Lateral  Medications: 40 mg triamcinolone acetonide 40 mg/mL  Patient tolerance: Patient tolerated the procedure well with no immediate complications    Ultrasound guidance was used for needle localization with [employer] Edge 2, 9-L MHz linear probe(s). Images were saved and stored for documentation. The lateral hip structures were well visualized. Dynamic visualization of the needle was continuous throughout the procedure and maintained good position and correct needle placement.      Triamcinolone:  NDC: [address]-5  LOT: [id number]  EXP: 02/2024       ASSESSMENT:      ICD-10-CM ICD-9-CM   1. Lateral pain of right hip  M25.551 719.45   2. It band syndrome, right  M76.31 728.89   3. Greater trochanteric bursitis of right hip [M70.61 (ICD-10-CM)]  M70.61 726.5         PLAN:  1-2.  Lateral right hip pain/IT band syndrome/greater trochanteric bursitis - improved, then deteriorated     - Mrs. Olivares presents with 3 months of right sided hip pain and mechanical dysfunction that is multi-factorial in etiology and is consistent with IT band syndrome, gluteus medius tendonitis, right SIJ dysfunction, and hip girdle myofascial pain. She has responded well to OMT and HEP with IT foam roller in the past but has recently become less compliant with her HEP.     - She admits to improved pain with Medrol Dosepack but states her pain has worsened since discontinuing the medication.     - After discussing options, she elects to proceed with ultrasound guided right hip GTB CSI. See above for procedure detail.     - Continue with Meloxicam 7.5 mg daily as needed.    - Continue with HEP for  gluteus dysfunction, IT band, SIJ dysfunction prescribed at initial visit.     - Referral to [facility] for physical therapy placed today.       Future planning includes - possibly more OMT if helpful and if indicated, repeat GTB CSI in 3 months if helpful    All questions were answered to the best of my ability and all concerns were addressed at this time.    Follow up as needed.       This note is dictated using the M*Modal Fluency Direct word recognition program. There are word recognition mistakes that are occasionally missed on review.      Total time spent face-to face with patient counseling or coordinating care including prognosis, differential diagnosis, risks and benefits of treatment, instructions, compliance risk reductions as well as non-face-to-face time personally spent reviewing medial record, medical documentation, and coordination of care.     EST MINUTES X   80541 10-19    22322 20-29    73050 30-39 X   99215 40-54    NEW     96808 15-29    67027 30-44    80786 45-59    51676 60-74    PHONE      5-10    [id number] 11-20    69030 21-30

## 2022-11-07 ENCOUNTER — PATIENT MESSAGE (OUTPATIENT)
Dept: DERMATOLOGY | Facility: CLINIC | Age: 67
End: 2022-11-07
Payer: MEDICARE

## 2022-11-15 ENCOUNTER — CLINICAL SUPPORT (OUTPATIENT)
Dept: REHABILITATION | Facility: OTHER | Age: 67
End: 2022-11-15
Payer: MEDICARE

## 2022-11-15 DIAGNOSIS — M54.50 CHRONIC BILATERAL LOW BACK PAIN WITHOUT SCIATICA: ICD-10-CM

## 2022-11-15 DIAGNOSIS — M25.551 LATERAL PAIN OF RIGHT HIP: ICD-10-CM

## 2022-11-15 DIAGNOSIS — G89.29 CHRONIC BILATERAL LOW BACK PAIN WITHOUT SCIATICA: ICD-10-CM

## 2022-11-15 DIAGNOSIS — M25.551 HIP PAIN, CHRONIC, RIGHT: ICD-10-CM

## 2022-11-15 DIAGNOSIS — G89.29 HIP PAIN, CHRONIC, RIGHT: ICD-10-CM

## 2022-11-15 DIAGNOSIS — M62.81 WEAKNESS OF TRUNK MUSCULATURE: Primary | ICD-10-CM

## 2022-11-15 DIAGNOSIS — R29.898 WEAKNESS OF RIGHT HIP: ICD-10-CM

## 2022-11-15 DIAGNOSIS — R68.89 DECREASED FUNCTIONAL ACTIVITY TOLERANCE: ICD-10-CM

## 2022-11-15 PROCEDURE — 97161 PT EVAL LOW COMPLEX 20 MIN: CPT | Mod: PN | Performed by: PHYSICAL THERAPIST

## 2022-11-15 PROCEDURE — 97110 THERAPEUTIC EXERCISES: CPT | Mod: PN | Performed by: PHYSICAL THERAPIST

## 2022-11-16 PROBLEM — G89.29 CHRONIC BILATERAL LOW BACK PAIN WITHOUT SCIATICA: Status: RESOLVED | Noted: 2022-06-20 | Resolved: 2022-11-16

## 2022-11-16 PROBLEM — G89.29 HIP PAIN, CHRONIC, RIGHT: Status: ACTIVE | Noted: 2022-11-16

## 2022-11-16 PROBLEM — R68.89 DECREASED FUNCTIONAL ACTIVITY TOLERANCE: Status: ACTIVE | Noted: 2022-11-16

## 2022-11-16 PROBLEM — M62.81 WEAKNESS OF TRUNK MUSCULATURE: Status: RESOLVED | Noted: 2022-06-20 | Resolved: 2022-11-16

## 2022-11-16 PROBLEM — M54.50 CHRONIC BILATERAL LOW BACK PAIN WITHOUT SCIATICA: Status: RESOLVED | Noted: 2022-06-20 | Resolved: 2022-11-16

## 2022-11-16 PROBLEM — M25.551 HIP PAIN, CHRONIC, RIGHT: Status: ACTIVE | Noted: 2022-11-16

## 2022-11-16 PROBLEM — R29.898 WEAKNESS OF RIGHT HIP: Status: ACTIVE | Noted: 2022-11-16

## 2022-11-16 NOTE — PLAN OF CARE
[facility]   Physical Therapy Initial Evaluation     Date: 11/15/2022   Name: Sara David  Clinic Number: [id number]    Therapy Diagnosis:   Encounter Diagnoses   Name Primary?    Lateral pain of right hip     Weakness of trunk musculature Yes    Chronic bilateral low back pain without sciatica     Hip pain, chronic, right     Decreased functional activity tolerance     Weakness of right hip      Physician: Taqueria Guy,     Physician Orders: PT Eval and Treat   Neuromuscular re-education, eccentric core, pelvic, glute strengthening and stabilizing, gait retraining, establish home exercise program, other modalities as seen fit     Medical Diagnosis from Referral: M25.551 (ICD-10-CM) - Lateral pain of right hip  Evaluation Date: 11/15/2022  Authorization Period Expiration: 12/31/2022  Plan of Care Expiration: 2/15/2023  Progress Note Due: 12/15/2022  Visit # / Visits authorized: 1/ 1   FOTO: 1/5    Precautions: Standard     Time In: 4:45 pm  Time Out: 5:25 pm  Total Appointment Time (timed & untimed codes): 40 minutes      SUBJECTIVE     Date of onset: July 2023    History of current condition - Elise reports: Chronic low back and B hip pain; R hip pain exacerbated over the summer in July while hiking after years of not doing so. Pain is R posterior lateral thigh; worse with sitting and lying on her side. Rode her bike to therapy today, She was seen by sports medicine and symptoms greatly improved with the steroid injection. She has returned to her aqua aerobics class and feels better on days that she exercises. Also walking on the treadmill and doing some of the exercises prescribed by Dr. Guy. She has her handouts with her to review. Denies and N/T, falls.    Falls: none    Imaging, see chart review    Prior Therapy: yes, for low back  Social History:  lives with their family  Prior Level of Function: independent with ADL's  Current Level of Function: independent with  ADL's, difficulty with prolonged walking, prolonged sitting, recreation and hiking    Pain:  Current 3/10, worst 6/10, best 0/10   Location: R posterior lateral hip  Description: Aching  Aggravating Factors: sitting, lying on side  Easing Factors: injection    Patients goals: To return to recreational activities without pain     Medical History:   Past Medical History:   Diagnosis Date    Colon polyp 2008    Disorder of kidney and ureter     Endometriosis     History of melanoma in situ 4/17/2018    Left arm, s/p excision by Alexandre Gomez MD    Hypothyroidism     dx in her 40s, enlarged thyroid, did have FNA of thyroid nodule which was benign    Melanoma 1990, 2018 1990s - on back; 2018 - L upper arm    Miscarriage        Surgical History:   Sara David  has a past surgical history that includes Back surgery; [place] tooth extraction; Dilation and curettage of uterus; Salpingoophorectomy; FNA thyroid; Malignant skin lesion excision; and Exploratory laparotomy with uterine myomectomy.    Medications:   Sara has a current medication list which includes the following prescription(s): acetaminophen, cetirizine, vitamin d3, climara pro, levothyroxine, meloxicam, and trazodone.    Allergies:   Review of patient's allergies indicates:   Allergen Reactions    Iodinated contrast media           OBJECTIVE     Observation: non antalgic gait    Posture:  slightly flexed forward at hips; B knee flexion    MMT   Left  Right    Hip:  Flexion   4/5  4/5  Extension  4/5  4-/5  Abduction  3/5  3-/5  Adduction  4/5  4/5  External Rotation 4/5  4+/5  Internal rotation 5/5  4/5    Knee:  Flexion   5/5  5/5  Extension  5/5  5/5    Ankle:  Dorsiflexion  5/5  5/5  Plantar flexion  5/5  5/5     AROM/ PROM    Hip:  Flexion: L: 110, R: 110  Extension: L: 5, R: 5  External Rotation L: 40, R: 30  Internal rotation L: 25, R: 20    Special Tests:  ROXANNE: positive R  FADIR: negative (Active FADIR negative)  Scour: negative  Active  "SLR: positive for increased effort and poor load transfers with R hip flexion    Timed single leg stance: R: 8 sec, L: 22 sec    Joint Mobility: decreased PA coxofemoral joint mobility     Palpation: TTP R greater trochanter and gluteus medius distal     Sensation: grossly intact to LT    Flexibility:    Ely's test: positive for increased APT   SLR: R = 70 degrees ; L = 85 degrees   Radha's test: R = + ; L = +          Limitation/Restriction for FOTO Hip Survey    Therapist reviewed FOTO scores for Sara David on 11/15/2022.   FOTO documents entered into [employer] - see Media section.    Limitation Score: 43%         TREATMENT     Total Treatment time (time-based codes) separate from Evaluation: 15 minutes      Elise received the treatments listed below:      therapeutic exercises to develop flexibility, posture, and core stabilization for 15 minutes including:  Transverse abdominus activation and instruction--> heel slide (modified from ant marches in HEP)  Standing hip flexor stretch with educ for PPT and alignment  Clams with pillow between knees and lifting to neutral 10" x 10  S/L hip abd- held 2/2 exacerbation of pain  Glute sets in prone  Hip extension (modified over edge of bed for improved activation without erector spinae over compensation) x 10 ea      PATIENT EDUCATION AND HOME EXERCISES     Education provided:   - HEP, role of PT, POC. Gradual progression of weight bearing exercise and pool exercise per tolerance    Written Home Exercises Provided: N/A- reviewed exercise packets issued by physician. Exercises were reviewed and Elise was able to demonstrate them prior to the end of the session.  Elise demonstrated good  understanding of the education provided.     ASSESSMENT     Sara is a 67 y.o. female referred to outpatient Physical Therapy with a medical diagnosis of lateral pain of R hip. Patient presents with decreased R hip AROM/PROM with end range pain, weakness R gluteals, decreased single " limb balance R, and decreased flexibility posterior lateral hip.     Patient prognosis is Good.   Patient will benefit from skilled outpatient Physical Therapy to address the deficits stated above and in the chart below, provide patient /family education, and to maximize patientt's level of independence.     Plan of care discussed with patient: Yes  Patient's spiritual, cultural and educational needs considered and patient is agreeable to the plan of care and goals as stated below:     Anticipated Barriers for therapy: none    Medical Necessity is demonstrated by the following  History  Co-morbidities and personal factors that may impact the plan of care Co-morbidities:   Chronic LBP    Personal Factors:   no deficits     moderate   Examination  Body Structures and Functions, activity limitations and participation restrictions that may impact the plan of care Body Regions:   back  lower extremities  trunk    Body Systems:    ROM  strength  balance  gait  motor control    Participation Restrictions:   Walking, sitting    Activity limitations:   Learning and applying knowledge  no deficits    General Tasks and Commands  no deficits    Communication  no deficits    Mobility  walking    Self care  no deficits    Domestic Life  doing house work (cleaning house, washing dishes, laundry)    Interactions/Relationships  no deficits    Life Areas  no deficits    Community and Social Life  recreation and leisure         high   Clinical Presentation stable and uncomplicated low   Decision Making/ Complexity Score: low     Goals:  Short Term Goals: 6 weeks   1. Patient to demonstrate improved motor control of trunk and pelvis as noted by negative active straight leg raise test for ease with transfers  2. Patient to report decreased pain in R hip by 30% or greater with ADL's  3. Patient to increased flexibility R hamstrings by 10 degrees or greater in SLR position for ease with bending    Long Term Goals: 12 weeks   1. Patient to  be independent with home exercise program for improved self management of condition  2. Patient to have decreased subjective report of disability as noted by a score of 20-40% on the FOTO Hip questionnaire   3. Patient to increased strength in BLE's by 1/2 muscle grade or greater for improved performance of ADL's   4. Patient to have improved gluteus medius strength and balance as noted by SLS R 20 seconds or greater       PLAN   Plan of care Certification: 11/15/2022 to 2/15/2023.    Outpatient Physical Therapy 2 times weekly for 8 weeks to include the following interventions: Gait Training, Manual Therapy, Moist Heat/ Ice, Neuromuscular Re-ed, Patient Education, Therapeutic Activities, Therapeutic Exercise, Ultrasound, and dry needling.   Other: patient will be travelling over the holidays and expected lapse in care    Mercedes Neri PT

## 2022-11-29 ENCOUNTER — CLINICAL SUPPORT (OUTPATIENT)
Dept: REHABILITATION | Facility: OTHER | Age: 67
End: 2022-11-29
Payer: MEDICARE

## 2022-11-29 DIAGNOSIS — R29.898 WEAKNESS OF RIGHT HIP: ICD-10-CM

## 2022-11-29 DIAGNOSIS — G89.29 HIP PAIN, CHRONIC, RIGHT: Primary | ICD-10-CM

## 2022-11-29 DIAGNOSIS — R68.89 DECREASED FUNCTIONAL ACTIVITY TOLERANCE: ICD-10-CM

## 2022-11-29 DIAGNOSIS — M25.551 HIP PAIN, CHRONIC, RIGHT: Primary | ICD-10-CM

## 2022-11-29 PROCEDURE — 97140 MANUAL THERAPY 1/> REGIONS: CPT | Mod: PN | Performed by: PHYSICAL THERAPIST

## 2022-11-29 PROCEDURE — 97110 THERAPEUTIC EXERCISES: CPT | Mod: PN | Performed by: PHYSICAL THERAPIST

## 2022-11-29 NOTE — PROGRESS NOTES
"[facility]   Physical Therapy Treatment Note     Name: Sara David  Clinic Number: [id number]    Therapy Diagnosis:   Encounter Diagnoses   Name Primary?    Hip pain, chronic, right Yes    Decreased functional activity tolerance     Weakness of right hip      Physician: Lorena Guy DO    Visit Date: 11/29/2022    Physician Orders: PT Eval and Treat   Neuromuscular re-education, eccentric core, pelvic, glute strengthening and stabilizing, gait retraining, establish home exercise program, other modalities as seen fit      Medical Diagnosis from Referral: M25.551 (ICD-10-CM) - Lateral pain of right hip  Evaluation Date: 11/15/2022  Authorization Period Expiration: 12/31/2022  Plan of Care Expiration: 2/15/2023  Progress Note Due: 12/15/2022  Visit # / Visits authorized: 1/ 1   FOTO: 1/5     Precautions: Standard     PTA Visit #: 0/5     Time In: 11:30 am  Time Out: 12:15 pm  Total Billable Time: 45 minutes    SUBJECTIVE     Pt reports: doing weight machines at the gym and [employer] rather than the treadmill. The bent over hip extension was painful in her back and modified to all 4's.   She was compliant with home exercise program.  Response to previous treatment: none  Functional change: none    Pain: 3/10  Location: bilateral hips      OBJECTIVE     Objective Measures updated at progress report unless specified.     Treatment     Elise received the treatments listed below:      therapeutic exercises to develop strength, endurance, and core stabilization for 30 minutes including:    Transverse abdominus activation with overhead 4# DB reach x 2 min  TvA with march x 2  min alt  Standing hip flexor stretch with educ for PPT and alignment- continue  Clams with pillow between knees and lifting to neutral 10" x 10- modified to BKFO with RTB x 15 ea  Glute sets in prone- NT  Hip extension (modified over edge of bed for improved activation without erector spinae over compensation) x 10 " ea- modified to quadruped  Paloff press with walk outs x 15 ea, GTB  Modified plank x 20 sec    manual therapy techniques: Joint mobilizations were applied to the: hips (B) for 15 minutes, including:  AP coxofemoral JM grade III  Long leg distraction grade III  Lateral hip distraction grade III      Patient Education and Home Exercises     Home Exercises Provided and Patient Education Provided     Education provided:   - continued HEP; advance paloff press     Written Home Exercises Provided: yes. Exercises were reviewed and Elise was able to demonstrate them prior to the end of the session.  Elise demonstrated good  understanding of the education provided. See EMR under Patient Instructions for exercises provided during therapy sessions    ASSESSMENT     Elise with improved load transfer and transverse abdominus contraction since eval as noted by hook lying hip flexion with level pelvis. Fatigue with quadruped and BKFO in lumbar spine region. Will benefit from continued exercises for improved muscular endurance lumbar multifidus and QL.     Elise Is progressing well towards her goals.   Pt prognosis is Good.     Pt will continue to benefit from skilled outpatient physical therapy to address the deficits listed in the problem list box on initial evaluation, provide pt/family education and to maximize pt's level of independence in the home and community environment.     Pt's spiritual, cultural and educational needs considered and pt agreeable to plan of care and goals.     Anticipated barriers to physical therapy: none    Goals:     Short Term Goals: 6 weeks   1. Patient to demonstrate improved motor control of trunk and pelvis as noted by negative active straight leg raise test for ease with transfers (in progress, not met)  2. Patient to report decreased pain in R hip by 30% or greater with ADL's  (in progress, not met)  3. Patient to increased flexibility R hamstrings by 10 degrees or greater in SLR position for  ease with bending  (in progress, not met)     Long Term Goals: 12 weeks   1. Patient to be independent with home exercise program for improved self management of condition  (in progress, not met)  2. Patient to have decreased subjective report of disability as noted by a score of 20-40% on the FOTO Hip questionnaire   (in progress, not met)  3. Patient to increased strength in BLE's by 1/2 muscle grade or greater for improved performance of ADL's   (in progress, not met)  4. Patient to have improved gluteus medius strength and balance as noted by SLS R 20 seconds or greater   (in progress, not met)       PLAN     Continue core stabilization and manual therapy prn for joint stiffness and pain modulation     Mercedes Neri, PT

## 2022-11-30 ENCOUNTER — PATIENT MESSAGE (OUTPATIENT)
Dept: OBSTETRICS AND GYNECOLOGY | Facility: CLINIC | Age: 67
End: 2022-11-30
Payer: MEDICARE

## 2022-12-05 ENCOUNTER — CLINICAL SUPPORT (OUTPATIENT)
Dept: REHABILITATION | Facility: OTHER | Age: 67
End: 2022-12-05
Payer: MEDICARE

## 2022-12-05 DIAGNOSIS — M25.551 HIP PAIN, CHRONIC, RIGHT: Primary | ICD-10-CM

## 2022-12-05 DIAGNOSIS — G89.29 HIP PAIN, CHRONIC, RIGHT: Primary | ICD-10-CM

## 2022-12-05 DIAGNOSIS — R29.898 WEAKNESS OF RIGHT HIP: ICD-10-CM

## 2022-12-05 DIAGNOSIS — R68.89 DECREASED FUNCTIONAL ACTIVITY TOLERANCE: ICD-10-CM

## 2022-12-05 PROCEDURE — 97140 MANUAL THERAPY 1/> REGIONS: CPT | Mod: PN | Performed by: PHYSICAL THERAPIST

## 2022-12-05 PROCEDURE — 97110 THERAPEUTIC EXERCISES: CPT | Mod: PN | Performed by: PHYSICAL THERAPIST

## 2022-12-05 NOTE — PROGRESS NOTES
[facility]   Physical Therapy Treatment Note     Name: Sara David  Clinic Number: [id number]    Therapy Diagnosis:   Encounter Diagnoses   Name Primary?    Hip pain, chronic, right Yes    Decreased functional activity tolerance     Weakness of right hip      Physician: Lorena Guy DO    Visit Date: 12/5/2022    Physician Orders: PT Eval and Treat   Neuromuscular re-education, eccentric core, pelvic, glute strengthening and stabilizing, gait retraining, establish home exercise program, other modalities as seen fit      Medical Diagnosis from Referral: M25.551 (ICD-10-CM) - Lateral pain of right hip  Evaluation Date: 11/15/2022  Authorization Period Expiration: 12/31/2022  Plan of Care Expiration: 2/15/2023  Progress Note Due: 12/15/2022  Visit # / Visits authorized: 1/ 1   FOTO: 2/3     Precautions: Standard     PTA Visit #: 0/5     Time In: 0930 am  Time Out: 1025 pm  Total Billable Time: 55 minutes    SUBJECTIVE     Pt reports: Overall doing better and episodes of pain are less often. She will be out of town to help care for her in-laws and will not return until 1/10 next year. Inquiring about exercises she can do in standing position.   She was compliant with home exercise program.  Response to previous treatment: improvements with Joint mobilizations, no increased stiffness  Functional change: return     Pain: 3/10  Location: bilateral hips      OBJECTIVE     12/5/2022    MMT                            Left                  Right     Hip:  Flexion                         4+/5                 4+/5  Extension                    4/5                   4-/5  Abduction                    3+/5                   4-/5  Adduction                    4/5                   4/5  External Rotation        4/5                   4+/5  Internal rotation           5/5                   4/5        Timed single leg stance: R: 34 sec, L: 40 sec             Limitation/Restriction for FOTO Hip  "Survey     Therapist reviewed FOTO scores for Sara David on 12/5/2022.   FOTO documents entered into [employer] - see Media section.     Limitation Score: 37% (43% on IE)           Treatment     Elise received the treatments listed below:      therapeutic exercises to develop strength, endurance, and core stabilization for 40 minutes including:    Transverse abdominus activation with overhead 4# DB reach x 2 min- not today  Bridging with TvA activation 2 x 10  TvA with march/ alt arm x 2  min - on PB today  Pelvic tilts on PB x 1 min  Pelvic circles CW/CCW on PB x 1 min ea  Standing hip flexor stretch with educ for PPT and alignment- continue  Clams with pillow between knees and lifting to neutral 10" x 10- continue with addition of GTB  Standing Hip extension x 15 ea  Standing Hip abduction x 15 ea  Standing hip adduction x 15 ea  Paloff press with walk outs x 15 ea, GTB- continue in HEP  Modified plank x 20 sec- continue in HEP  Reassessment activities and education in HEP    manual therapy techniques: Joint mobilizations were applied to the: hips (B) for 15 minutes, including:  AP coxofemoral JM grade III  Long leg distraction grade III  Lateral hip distraction grade III      Patient Education and Home Exercises     Home Exercises Provided and Patient Education Provided     Education provided:   - education in HEP and self STM with rolling stick    Written Home Exercises Provided: yes. Exercises were reviewed and Elise was able to demonstrate them prior to the end of the session.  Elise demonstrated good  understanding of the education provided. See EMR under Patient Instructions for exercises provided during therapy sessions    ASSESSMENT     Elise presents to clinic for 3rd follow up visit. Pt demonstrating improvements in B hip strength, SL balance, and subjective report of disability on FOTO questionnaire. Continues with pain in weight bearing and resisted gluteal activation. Emphasis on education in " progression of home exercise program issued by sports medicine while out of town.     Elise Is progressing well towards her goals.   Pt prognosis is Good.     Pt will continue to benefit from skilled outpatient physical therapy to address the deficits listed in the problem list box on initial evaluation, provide pt/family education and to maximize pt's level of independence in the home and community environment.     Pt's spiritual, cultural and educational needs considered and pt agreeable to plan of care and goals.     Anticipated barriers to physical therapy: none    Goals:     Short Term Goals: 6 weeks   1. Patient to demonstrate improved motor control of trunk and pelvis as noted by negative active straight leg raise test for ease with transfers (in progress, not met)  2. Patient to report decreased pain in R hip by 30% or greater with ADL's  (in progress, not met)  3. Patient to increased flexibility R hamstrings by 10 degrees or greater in SLR position for ease with bending  (in progress, not met)     Long Term Goals: 12 weeks   1. Patient to be independent with home exercise program for improved self management of condition  (in progress, not met)  2. Patient to have decreased subjective report of disability as noted by a score of 20-40% on the FOTO Hip questionnaire   (in progress, not met)  3. Patient to increased strength in BLE's by 1/2 muscle grade or greater for improved performance of ADL's   (in progress, not met)  4. Patient to have improved gluteus medius strength and balance as noted by SLS R 20 seconds or greater   (12/5/2022 met)       PLAN     Pt will be out of town x 1 month. Pt to continue HEP and contact the office if any questions arise. Continue PT including core stabilization and manual therapy prn for joint stiffness and pain modulation once she returns.     Merceeds Neri, PT

## 2022-12-13 ENCOUNTER — PES CALL (OUTPATIENT)
Dept: ADMINISTRATIVE | Facility: CLINIC | Age: 67
End: 2022-12-13
Payer: MEDICARE

## 2022-12-23 ENCOUNTER — PATIENT MESSAGE (OUTPATIENT)
Dept: OBSTETRICS AND GYNECOLOGY | Facility: CLINIC | Age: 67
End: 2022-12-23
Payer: MEDICARE

## 2023-01-10 ENCOUNTER — PATIENT MESSAGE (OUTPATIENT)
Dept: OBSTETRICS AND GYNECOLOGY | Facility: CLINIC | Age: 68
End: 2023-01-10
Payer: MEDICARE

## 2023-01-10 DIAGNOSIS — N95.0 PMB (POSTMENOPAUSAL BLEEDING): Primary | ICD-10-CM

## 2023-01-10 RX ORDER — DIAZEPAM 5 MG/1
5 TABLET ORAL
Qty: 2 TABLET | Refills: 0 | Status: SHIPPED | OUTPATIENT
Start: 2023-01-10 | End: 2023-01-12 | Stop reason: ALTCHOICE

## 2023-01-12 ENCOUNTER — OFFICE VISIT (OUTPATIENT)
Dept: INTERNAL MEDICINE | Facility: CLINIC | Age: 68
End: 2023-01-12
Payer: MEDICARE

## 2023-01-12 VITALS
DIASTOLIC BLOOD PRESSURE: 72 MMHG | HEIGHT: 71 IN | SYSTOLIC BLOOD PRESSURE: 126 MMHG | WEIGHT: 196.88 LBS | OXYGEN SATURATION: 98 % | HEART RATE: 73 BPM | BODY MASS INDEX: 27.56 KG/M2

## 2023-01-12 DIAGNOSIS — G47.9 SLEEP DIFFICULTIES: ICD-10-CM

## 2023-01-12 DIAGNOSIS — N18.31 STAGE 3A CHRONIC KIDNEY DISEASE: Primary | ICD-10-CM

## 2023-01-12 DIAGNOSIS — G47.00 INSOMNIA, UNSPECIFIED TYPE: ICD-10-CM

## 2023-01-12 DIAGNOSIS — E03.9 ACQUIRED HYPOTHYROIDISM: ICD-10-CM

## 2023-01-12 DIAGNOSIS — N95.1 HOT FLASHES DUE TO MENOPAUSE: ICD-10-CM

## 2023-01-12 PROCEDURE — 99499 UNLISTED E&M SERVICE: CPT | Mod: S$GLB,,, | Performed by: STUDENT IN AN ORGANIZED HEALTH CARE EDUCATION/TRAINING PROGRAM

## 2023-01-12 PROCEDURE — 3078F PR MOST RECENT DIASTOLIC BLOOD PRESSURE < 80 MM HG: ICD-10-PCS | Mod: CPTII,S$GLB,, | Performed by: STUDENT IN AN ORGANIZED HEALTH CARE EDUCATION/TRAINING PROGRAM

## 2023-01-12 PROCEDURE — 3288F PR FALLS RISK ASSESSMENT DOCUMENTED: ICD-10-PCS | Mod: CPTII,S$GLB,, | Performed by: STUDENT IN AN ORGANIZED HEALTH CARE EDUCATION/TRAINING PROGRAM

## 2023-01-12 PROCEDURE — 1159F PR MEDICATION LIST DOCUMENTED IN MEDICAL RECORD: ICD-10-PCS | Mod: CPTII,S$GLB,, | Performed by: STUDENT IN AN ORGANIZED HEALTH CARE EDUCATION/TRAINING PROGRAM

## 2023-01-12 PROCEDURE — 99499 RISK ADDL DX/OHS AUDIT: ICD-10-PCS | Mod: S$GLB,,, | Performed by: STUDENT IN AN ORGANIZED HEALTH CARE EDUCATION/TRAINING PROGRAM

## 2023-01-12 PROCEDURE — 3074F SYST BP LT 130 MM HG: CPT | Mod: CPTII,S$GLB,, | Performed by: STUDENT IN AN ORGANIZED HEALTH CARE EDUCATION/TRAINING PROGRAM

## 2023-01-12 PROCEDURE — 99999 PR PBB SHADOW E&M-EST. PATIENT-LVL III: ICD-10-PCS | Mod: PBBFAC,,, | Performed by: STUDENT IN AN ORGANIZED HEALTH CARE EDUCATION/TRAINING PROGRAM

## 2023-01-12 PROCEDURE — 1101F PR PT FALLS ASSESS DOC 0-1 FALLS W/OUT INJ PAST YR: ICD-10-PCS | Mod: CPTII,S$GLB,, | Performed by: STUDENT IN AN ORGANIZED HEALTH CARE EDUCATION/TRAINING PROGRAM

## 2023-01-12 PROCEDURE — 1101F PT FALLS ASSESS-DOCD LE1/YR: CPT | Mod: CPTII,S$GLB,, | Performed by: STUDENT IN AN ORGANIZED HEALTH CARE EDUCATION/TRAINING PROGRAM

## 2023-01-12 PROCEDURE — 1160F RVW MEDS BY RX/DR IN RCRD: CPT | Mod: CPTII,S$GLB,, | Performed by: STUDENT IN AN ORGANIZED HEALTH CARE EDUCATION/TRAINING PROGRAM

## 2023-01-12 PROCEDURE — 1125F PR PAIN SEVERITY QUANTIFIED, PAIN PRESENT: ICD-10-PCS | Mod: CPTII,S$GLB,, | Performed by: STUDENT IN AN ORGANIZED HEALTH CARE EDUCATION/TRAINING PROGRAM

## 2023-01-12 PROCEDURE — 1160F PR REVIEW ALL MEDS BY PRESCRIBER/CLIN PHARMACIST DOCUMENTED: ICD-10-PCS | Mod: CPTII,S$GLB,, | Performed by: STUDENT IN AN ORGANIZED HEALTH CARE EDUCATION/TRAINING PROGRAM

## 2023-01-12 PROCEDURE — 99213 PR OFFICE/OUTPT VISIT, EST, LEVL III, 20-29 MIN: ICD-10-PCS | Mod: S$GLB,,, | Performed by: STUDENT IN AN ORGANIZED HEALTH CARE EDUCATION/TRAINING PROGRAM

## 2023-01-12 PROCEDURE — 3078F DIAST BP <80 MM HG: CPT | Mod: CPTII,S$GLB,, | Performed by: STUDENT IN AN ORGANIZED HEALTH CARE EDUCATION/TRAINING PROGRAM

## 2023-01-12 PROCEDURE — 1125F AMNT PAIN NOTED PAIN PRSNT: CPT | Mod: CPTII,S$GLB,, | Performed by: STUDENT IN AN ORGANIZED HEALTH CARE EDUCATION/TRAINING PROGRAM

## 2023-01-12 PROCEDURE — 99213 OFFICE O/P EST LOW 20 MIN: CPT | Mod: S$GLB,,, | Performed by: STUDENT IN AN ORGANIZED HEALTH CARE EDUCATION/TRAINING PROGRAM

## 2023-01-12 PROCEDURE — 99999 PR PBB SHADOW E&M-EST. PATIENT-LVL III: CPT | Mod: PBBFAC,,, | Performed by: STUDENT IN AN ORGANIZED HEALTH CARE EDUCATION/TRAINING PROGRAM

## 2023-01-12 PROCEDURE — 1159F MED LIST DOCD IN RCRD: CPT | Mod: CPTII,S$GLB,, | Performed by: STUDENT IN AN ORGANIZED HEALTH CARE EDUCATION/TRAINING PROGRAM

## 2023-01-12 PROCEDURE — 3288F FALL RISK ASSESSMENT DOCD: CPT | Mod: CPTII,S$GLB,, | Performed by: STUDENT IN AN ORGANIZED HEALTH CARE EDUCATION/TRAINING PROGRAM

## 2023-01-12 PROCEDURE — 3008F BODY MASS INDEX DOCD: CPT | Mod: CPTII,S$GLB,, | Performed by: STUDENT IN AN ORGANIZED HEALTH CARE EDUCATION/TRAINING PROGRAM

## 2023-01-12 PROCEDURE — 3008F PR BODY MASS INDEX (BMI) DOCUMENTED: ICD-10-PCS | Mod: CPTII,S$GLB,, | Performed by: STUDENT IN AN ORGANIZED HEALTH CARE EDUCATION/TRAINING PROGRAM

## 2023-01-12 PROCEDURE — 3074F PR MOST RECENT SYSTOLIC BLOOD PRESSURE < 130 MM HG: ICD-10-PCS | Mod: CPTII,S$GLB,, | Performed by: STUDENT IN AN ORGANIZED HEALTH CARE EDUCATION/TRAINING PROGRAM

## 2023-01-12 RX ORDER — TRAZODONE HYDROCHLORIDE 50 MG/1
TABLET ORAL
Qty: 90 TABLET | Refills: 3 | Status: SHIPPED | OUTPATIENT
Start: 2023-01-12

## 2023-01-12 RX ORDER — LEVOTHYROXINE SODIUM 88 UG/1
88 TABLET ORAL
Qty: 90 TABLET | Refills: 3 | Status: SHIPPED | OUTPATIENT
Start: 2023-01-12 | End: 2024-02-19

## 2023-01-12 NOTE — PROGRESS NOTES
Subjective:       Patient ID: Sara David is a 67 y.o. female.    Chief Complaint: Annual Exam    HPI  Patient is a 66 yo female here for her 6 month visit from Dr. Wheat. She has a pmhx CKD IIIa, melanoma in situ, hypothyroidism, vit D def, chronic hip pain, insomnia.   -has sinus congestion. Takes zyrtec and advil sinus and congestion when needed  -for the hip pain - much better after cortisone shot and PT  -back pain - did MRI. Has bulging discs and misalignment of spine. Did PT for that. It is better.   -reviewed labs from 4/18/22. Will redo at next visit.   -had a month and half of vaginal bleeding. On climara pro. She has a biopsy with her ob/gyn scheduled. Likely due to her being on climara.   -tdap today  -seeing her GI doc this month. Needs to get a colonoscopy     Review of Systems   Constitutional:  Negative for chills and fever.   HENT:  Positive for nasal congestion. Negative for sore throat.    Respiratory:  Negative for cough, chest tightness and shortness of breath.    Cardiovascular:  Negative for chest pain.   Gastrointestinal:  Negative for abdominal pain, blood in stool, constipation, diarrhea, nausea and vomiting.   Genitourinary:  Negative for dysuria and hematuria.   Neurological:  Negative for dizziness, light-headedness and headaches.   Psychiatric/Behavioral:  Negative for confusion.        Objective:      Physical Exam  Constitutional:       Appearance: Normal appearance.   HENT:      Mouth/Throat:      Mouth: Mucous membranes are moist.      Pharynx: Oropharynx is clear. No oropharyngeal exudate or posterior oropharyngeal erythema.   Eyes:      Conjunctiva/sclera: Conjunctivae normal.      Pupils: Pupils are equal, round, and reactive to light.   Cardiovascular:      Rate and Rhythm: Normal rate and regular rhythm.      Pulses: Normal pulses.      Heart sounds: Normal heart sounds. No murmur heard.  Pulmonary:      Effort: Pulmonary effort is normal. No respiratory distress.       Breath sounds: Normal breath sounds. No wheezing or rales.   Musculoskeletal:      Right lower leg: No edema.      Left lower leg: No edema.   Skin:     General: Skin is warm.   Neurological:      Mental Status: She is alert and oriented to person, place, and time.   Psychiatric:         Mood and Affect: Mood normal.         Behavior: Behavior normal.             Assessment and Plan:       1. Stage 3a chronic kidney disease  Assessment & Plan:  Reviewed last GFR which was 58. Avoid all nsaids as much as possible. Will recheck at next visit.       2. Acquired hypothyroidism  Overview:  due for repeat labs    Assessment & Plan:  Reviewed tsh from 4/2022. tsh is wnl. Will levothyroxine today/ will check tsh at next visit.     Orders:  -     levothyroxine (SYNTHROID) 88 MCG tablet; Take 1 tablet (88 mcg total) by mouth before breakfast.  Dispense: 90 tablet; Refill: 3    3. Sleep difficulties  Overview:  ok to continue trazodone    Orders:  -     traZODone (DESYREL) 50 MG tablet; TAKE 1 TABLET(50 MG) BY MOUTH NIGHTLY AS NEEDED FOR INSOMNIA  Dispense: 90 tablet; Refill: 3    4. Hot flashes due to menopause  Assessment & Plan:  Pt is on climara right now. Having vaginal bleeding. Her gyn is working on scheduling a uterine biopsy.       5. Insomnia, unspecified type  Assessment & Plan:  Pt takes trazodone at night as needed. It is helping her sleep. Continue. Will refill today

## 2023-01-12 NOTE — ASSESSMENT & PLAN NOTE
Pt is on climara right now. Having vaginal bleeding. Her gyn is working on scheduling a uterine biopsy.

## 2023-01-12 NOTE — ASSESSMENT & PLAN NOTE
Reviewed last GFR which was 58. Avoid all nsaids as much as possible. Will recheck at next visit.

## 2023-01-17 ENCOUNTER — CLINICAL SUPPORT (OUTPATIENT)
Dept: REHABILITATION | Facility: OTHER | Age: 68
End: 2023-01-17
Payer: MEDICARE

## 2023-01-17 DIAGNOSIS — G89.29 HIP PAIN, CHRONIC, RIGHT: Primary | ICD-10-CM

## 2023-01-17 DIAGNOSIS — R68.89 DECREASED FUNCTIONAL ACTIVITY TOLERANCE: ICD-10-CM

## 2023-01-17 DIAGNOSIS — M25.551 HIP PAIN, CHRONIC, RIGHT: Primary | ICD-10-CM

## 2023-01-17 DIAGNOSIS — R29.898 WEAKNESS OF RIGHT HIP: ICD-10-CM

## 2023-01-17 PROCEDURE — 97110 THERAPEUTIC EXERCISES: CPT | Mod: PN | Performed by: PHYSICAL THERAPIST

## 2023-01-17 NOTE — PROGRESS NOTES
[facility]   Physical Therapy Treatment Note     Name: Sara David  Clinic Number: [id number]    Therapy Diagnosis:   Encounter Diagnoses   Name Primary?    Hip pain, chronic, right Yes    Decreased functional activity tolerance     Weakness of right hip      Physician: Taqueria Guy DO    Visit Date: 1/17/2023    Physician Orders: PT Eval and Treat   Neuromuscular re-education, eccentric core, pelvic, glute strengthening and stabilizing, gait retraining, establish home exercise program, other modalities as seen fit      Medical Diagnosis from Referral: M25.551 (ICD-10-CM) - Lateral pain of right hip  Evaluation Date: 11/15/2022  Authorization Period Expiration: 12/31/2022  Plan of Care Expiration: 2/15/2023  Progress Note Due: 12/15/2022  Visit # / Visits authorized: 4/ 1   FOTO: 3/3     Precautions: Standard     PTA Visit #: 0/5     Time In: 1120  Time Out: 1205  Total Billable Time: 45 minutes    SUBJECTIVE     Pt reports: she feels like therapy has been very helpful. She wasn't very consistent with her HEP with out of town, but has started getting back to them. Pain is overall much better, but still has low level pain to hips and back. She says she still has pain with stairs. Pt says she may or may not be able to continue with therapy at this time due to family issues.   She was compliant with home exercise program.  Response to previous treatment: improvements with Joint mobilizations, no increased stiffness  Functional change: return     Pain: 1/10  Location: bilateral hips      OBJECTIVE     1/17/2023    MMT                            Left                  Right     Hip:  Flexion                         4+/5                 4+/5  Extension                    4/5                   4-/5  Abduction                    4-/5                   4-/5  Adduction                    4+/5                   4+/5  External Rotation        4/5                   4+/5  Internal rotation    "        4/5                   4/5        Timed single leg stance: R: 34 sec, L: 40 sec             Limitation/Restriction for FOTO Hip Survey     Therapist reviewed FOTO scores for Sara David on 1/17/2023.   FOTO documents entered into [facility] - see Media section.     Limitation Score: 43% (43% on IE)           Treatment     Elise received the bolded treatments listed below:      therapeutic exercises to develop strength, endurance, and core stabilization for 45 minutes including:      Bridging with TvA activation 2 x 10  +Bridge march 5x  Clams 10x 10"   +Cruzito 10 x 10"  +SLR abd 5" x 10    +Prone over EOM hip extension 2 x 10    Transverse abdominus activation with overhead 4# DB reach x 2 min- not today  TvA with march/ alt arm x 2  min - on PB today  Pelvic tilts on PB x 1 min  Pelvic circles CW/CCW on PB x 1 min ea  Standing hip flexor stretch with educ for PPT and alignment- continue  Standing Hip extension x 15 ea  Standing Hip abduction x 15 ea  Standing hip adduction x 15 ea  Paloff press with walk outs x 15 ea, GTB- continue in HEP  Modified plank x 20 sec- continue in HEP      manual therapy techniques: Joint mobilizations were applied to the: hips (B) for 15 minutes, including:  AP coxofemoral JM grade III  Long leg distraction grade III  Lateral hip distraction grade III      Patient Education and Home Exercises     Home Exercises Provided and Patient Education Provided     Education provided:   - education in HEP and self STM with rolling stick    Written Home Exercises Provided: yes. Exercises were reviewed and Elise was able to demonstrate them prior to the end of the session.  Elise demonstrated good  understanding of the education provided. See EMR under Patient Instructions for exercises provided during therapy sessions    ASSESSMENT     Pt returns after absence due to being out of town. She reports inconsistent performance of HEP, but overall pain levels have improved. Continued " limitation with stair navigation. FOTO score equal to evaluation. With MMT she continues with weakness noted to both hips as above. Good tolerance to progression of therex today.      Elise Is progressing well towards her goals.   Pt prognosis is Good.     Pt will continue to benefit from skilled outpatient physical therapy to address the deficits listed in the problem list box on initial evaluation, provide pt/family education and to maximize pt's level of independence in the home and community environment.     Pt's spiritual, cultural and educational needs considered and pt agreeable to plan of care and goals.     Anticipated barriers to physical therapy: none    Goals:     Short Term Goals: 6 weeks   1. Patient to demonstrate improved motor control of trunk and pelvis as noted by negative active straight leg raise test for ease with transfers (in progress, not met)  2. Patient to report decreased pain in R hip by 30% or greater with ADL's  (met 1/17/2023)  3. Patient to increased flexibility R hamstrings by 10 degrees or greater in SLR position for ease with bending  (in progress, not met)     Long Term Goals: 12 weeks   1. Patient to be independent with home exercise program for improved self management of condition  (in progress, not met)  2. Patient to have decreased subjective report of disability as noted by a score of 20-40% on the FOTO Hip questionnaire   (in progress, not met)  3. Patient to increased strength in BLE's by 1/2 muscle grade or greater for improved performance of ADL's   (in progress, not met)  4. Patient to have improved gluteus medius strength and balance as noted by SLS R 20 seconds or greater   (12/5/2022 met)       PLAN     Continue PT including core stabilization and manual therapy prn for joint stiffness and pain modulation.     Kristie Oliva, PT

## 2023-01-25 ENCOUNTER — PATIENT MESSAGE (OUTPATIENT)
Dept: OBSTETRICS AND GYNECOLOGY | Facility: CLINIC | Age: 68
End: 2023-01-25
Payer: MEDICARE

## 2023-02-08 ENCOUNTER — DOCUMENTATION ONLY (OUTPATIENT)
Dept: REHABILITATION | Facility: OTHER | Age: 68
End: 2023-02-08
Payer: MEDICARE

## 2023-02-08 DIAGNOSIS — R68.89 DECREASED FUNCTIONAL ACTIVITY TOLERANCE: ICD-10-CM

## 2023-02-08 DIAGNOSIS — G89.29 HIP PAIN, CHRONIC, RIGHT: Primary | ICD-10-CM

## 2023-02-08 DIAGNOSIS — M25.551 HIP PAIN, CHRONIC, RIGHT: Primary | ICD-10-CM

## 2023-02-08 DIAGNOSIS — R29.898 WEAKNESS OF RIGHT HIP: ICD-10-CM

## 2023-02-08 NOTE — PROGRESS NOTES
[facility]  Physical Therapy Discharge Note    Name: Sara David  Clinic Number: [id number]    Therapy Diagnosis:   Encounter Diagnoses   Name Primary?    Hip pain, chronic, right Yes    Decreased functional activity tolerance     Weakness of right hip      Physician: Taqueria Guy,     Physician Orders: PT Eval and Treat   Neuromuscular re-education, eccentric core, pelvic, glute strengthening and stabilizing, gait retraining, establish home exercise program, other modalities as seen fit      Medical Diagnosis from Referral: M25.551 (ICD-10-CM) - Lateral pain of right hip  Evaluation Date: 11/15/2022      Date of Last visit: 1/17/2023  Total Visits Received: 4    ASSESSMENT      Unable to assess current condition, pt cancelled last 2 visits. At time of last visit, pt reported good improvement  with pain levels, but continued with some functional limitations including difficulty with stair navigation.     Discharge reason: Patient has not attended therapy since 1/17/2023    Discharge FOTO Score: 43%    Goals:   Short Term Goals: 6 weeks   1. Patient to demonstrate improved motor control of trunk and pelvis as noted by negative active straight leg raise test for ease with transfers (in progress, not met)  2. Patient to report decreased pain in R hip by 30% or greater with ADL's  (met 1/17/2023)  3. Patient to increased flexibility R hamstrings by 10 degrees or greater in SLR position for ease with bending  (in progress, not met)     Long Term Goals: 12 weeks   1. Patient to be independent with home exercise program for improved self management of condition  (in progress, not met)  2. Patient to have decreased subjective report of disability as noted by a score of 20-40% on the FOTO Hip questionnaire   (in progress, not met)  3. Patient to increased strength in BLE's by 1/2 muscle grade or greater for improved performance of ADL's   (in progress, not met)  4. Patient to have  improved gluteus medius strength and balance as noted by SLS R 20 seconds or greater   (12/5/2022 met)    PLAN   This patient is discharged from Physical Therapy      Kristie Oliva, PT

## 2023-02-28 ENCOUNTER — TELEPHONE (OUTPATIENT)
Dept: OBSTETRICS AND GYNECOLOGY | Facility: CLINIC | Age: 68
End: 2023-02-28
Payer: MEDICARE

## 2023-03-16 ENCOUNTER — OFFICE VISIT (OUTPATIENT)
Dept: DERMATOLOGY | Facility: CLINIC | Age: 68
End: 2023-03-16
Payer: MEDICARE

## 2023-03-16 DIAGNOSIS — Z86.006 HISTORY OF MELANOMA IN SITU: ICD-10-CM

## 2023-03-16 DIAGNOSIS — D22.5 MULTIPLE BENIGN MELANOCYTIC NEVI OF UPPER EXTREMITY, LOWER EXTREMITY, AND TRUNK: ICD-10-CM

## 2023-03-16 DIAGNOSIS — D22.70 MULTIPLE BENIGN MELANOCYTIC NEVI OF UPPER EXTREMITY, LOWER EXTREMITY, AND TRUNK: ICD-10-CM

## 2023-03-16 DIAGNOSIS — D48.5 NEOPLASM OF UNCERTAIN BEHAVIOR OF SKIN: Primary | ICD-10-CM

## 2023-03-16 DIAGNOSIS — D22.30 MELANOCYTIC NEVI OF FACE: ICD-10-CM

## 2023-03-16 DIAGNOSIS — Z12.83 SCREENING EXAM FOR SKIN CANCER: ICD-10-CM

## 2023-03-16 DIAGNOSIS — D22.60 MULTIPLE BENIGN MELANOCYTIC NEVI OF UPPER EXTREMITY, LOWER EXTREMITY, AND TRUNK: ICD-10-CM

## 2023-03-16 DIAGNOSIS — L81.4 LENTIGINES: ICD-10-CM

## 2023-03-16 PROCEDURE — 11103 PR TANGENTIAL BIOPSY, SKIN, EA ADDTL LESION: ICD-10-PCS | Mod: S$GLB,,, | Performed by: DERMATOLOGY

## 2023-03-16 PROCEDURE — 11102 PR TANGENTIAL BIOPSY, SKIN, SINGLE LESION: ICD-10-PCS | Mod: S$GLB,,, | Performed by: DERMATOLOGY

## 2023-03-16 PROCEDURE — 99213 PR OFFICE/OUTPT VISIT, EST, LEVL III, 20-29 MIN: ICD-10-PCS | Mod: 25,S$GLB,, | Performed by: DERMATOLOGY

## 2023-03-16 PROCEDURE — 11102 TANGNTL BX SKIN SINGLE LES: CPT | Mod: S$GLB,,, | Performed by: DERMATOLOGY

## 2023-03-16 PROCEDURE — 1101F PR PT FALLS ASSESS DOC 0-1 FALLS W/OUT INJ PAST YR: ICD-10-PCS | Mod: CPTII,S$GLB,, | Performed by: DERMATOLOGY

## 2023-03-16 PROCEDURE — 1160F PR REVIEW ALL MEDS BY PRESCRIBER/CLIN PHARMACIST DOCUMENTED: ICD-10-PCS | Mod: CPTII,S$GLB,, | Performed by: DERMATOLOGY

## 2023-03-16 PROCEDURE — 88305 TISSUE EXAM BY PATHOLOGIST: CPT | Mod: 26,,, | Performed by: PATHOLOGY

## 2023-03-16 PROCEDURE — 1159F PR MEDICATION LIST DOCUMENTED IN MEDICAL RECORD: ICD-10-PCS | Mod: CPTII,S$GLB,, | Performed by: DERMATOLOGY

## 2023-03-16 PROCEDURE — 1159F MED LIST DOCD IN RCRD: CPT | Mod: CPTII,S$GLB,, | Performed by: DERMATOLOGY

## 2023-03-16 PROCEDURE — 88305 TISSUE EXAM BY PATHOLOGIST: ICD-10-PCS | Mod: 26,,, | Performed by: PATHOLOGY

## 2023-03-16 PROCEDURE — 1126F PR PAIN SEVERITY QUANTIFIED, NO PAIN PRESENT: ICD-10-PCS | Mod: CPTII,S$GLB,, | Performed by: DERMATOLOGY

## 2023-03-16 PROCEDURE — 88305 TISSUE EXAM BY PATHOLOGIST: CPT | Performed by: PATHOLOGY

## 2023-03-16 PROCEDURE — 3288F PR FALLS RISK ASSESSMENT DOCUMENTED: ICD-10-PCS | Mod: CPTII,S$GLB,, | Performed by: DERMATOLOGY

## 2023-03-16 PROCEDURE — 1126F AMNT PAIN NOTED NONE PRSNT: CPT | Mod: CPTII,S$GLB,, | Performed by: DERMATOLOGY

## 2023-03-16 PROCEDURE — 11103 TANGNTL BX SKIN EA SEP/ADDL: CPT | Mod: S$GLB,,, | Performed by: DERMATOLOGY

## 2023-03-16 PROCEDURE — 99213 OFFICE O/P EST LOW 20 MIN: CPT | Mod: 25,S$GLB,, | Performed by: DERMATOLOGY

## 2023-03-16 PROCEDURE — 1101F PT FALLS ASSESS-DOCD LE1/YR: CPT | Mod: CPTII,S$GLB,, | Performed by: DERMATOLOGY

## 2023-03-16 PROCEDURE — 1160F RVW MEDS BY RX/DR IN RCRD: CPT | Mod: CPTII,S$GLB,, | Performed by: DERMATOLOGY

## 2023-03-16 PROCEDURE — 3288F FALL RISK ASSESSMENT DOCD: CPT | Mod: CPTII,S$GLB,, | Performed by: DERMATOLOGY

## 2023-03-16 RX ORDER — SOD SULF/POT CHLORIDE/MAG SULF 1.479 G
TABLET ORAL
COMMUNITY
Start: 2023-01-26 | End: 2023-09-13 | Stop reason: CLARIF

## 2023-03-16 NOTE — PROGRESS NOTES
"  Patient Information  Name: Sara David  : 1955  MRN: [id number]     Referring Physician:  No ref. provider found   Primary Care Physician:  Lynda Wheat MD   Date of Visit: 2023      Subjective:     History of Present lllness:    Sara David is a 67 y.o. female who presents with a chief complaint of moles, couple spots on nose and forehead.   This is a high risk patient with a personal history of melanoma in situ (2018) who is here today to check for the development of new lesions.  Patient is here today for a "mole" check.     Today, patient complains of lesion(s):  Location: nasal tip  Duration: 6 months  Symptoms: bleeds at times no pain  Relieving factors/Previous treatments: none    Location: Nasal sidewall  Duration: 1 year  Signs/Symptoms: white hard and growing  Relieving factors/Prior treatments: none    Location: forehead  Duration: 1 year  Signs/Symptoms: red raised and bleeds at times, no pain  Relieving factors/Prior treatments: none    Patient was last seen: 2021.  Prior notes by myself reviewed.   Clinical documentation obtained by nursing staff reviewed.    Review of Systems   Skin:  Positive for daily sunscreen use and activity-related sunscreen use.     Objective:   Physical Exam   Constitutional: She appears well-developed and well-nourished. No distress.   HENT:   Mouth/Throat: Lips normal.    Eyes: Lids are normal.  No conjunctival no injection.   Neurological: She is alert and oriented to person, place, and time. She is not disoriented.   Psychiatric: She has a normal mood and affect.   Skin:   Areas Examined (abnormalities noted in diagram):   Scalp / Hair Palpated and Inspected  Head / Face Inspection Performed  Neck Inspection Performed  Chest / Axilla Inspection Performed  Abdomen Inspection Performed  Back Inspection Performed  RUE Inspected  LUE Inspection Performed  RLE Inspected  LLE Inspection Performed  Nails and Digits Inspection " Performed  Gland Inspection Performed                   Diagram Legend     Erythematous scaling macule/papule c/w actinic keratosis       Vascular papule c/w angioma      Pigmented verrucoid papule/plaque c/w seborrheic keratosis      Yellow umbilicated papule c/w sebaceous hyperplasia      Irregularly shaped tan macule c/w lentigo     1-2 mm smooth white papules consistent with Milia      Movable subcutaneous cyst with punctum c/w epidermal inclusion cyst      Subcutaneous movable cyst c/w pilar cyst      Firm pink to brown papule c/w dermatofibroma      Pedunculated fleshy papule(s) c/w skin tag(s)      Evenly pigmented macule c/w junctional nevus     Mildly variegated pigmented, slightly irregular-bordered macule c/w mildly atypical nevus      Flesh colored to evenly pigmented papule c/w intradermal nevus       Pink pearly papule/plaque c/w basal cell carcinoma      Erythematous hyperkeratotic cursted plaque c/w SCC      Surgical scar with no sign of skin cancer recurrence      Open and closed comedones      Inflammatory papules and pustules      Verrucoid papule consistent consistent with wart     Erythematous eczematous patches and plaques     Dystrophic onycholytic nail with subungual debris c/w onychomycosis     Umbilicated papule    Erythematous-base heme-crusted tan verrucoid plaque consistent with inflamed seborrheic keratosis     Erythematous Silvery Scaling Plaque c/w Psoriasis     See annotation            [] Data reviewed  [] Prior external notes reviewed  [] Independent review of test  [] Management discussed with another provider  [] Independent historian    Assessment / Plan:      Pathology Orders:       Normal Orders This Visit    Specimen to Pathology, Dermatology     Questions:    Procedure Type: Dermatology and skin neoplasms    Number of Specimens: 3    ------------------------: -------------------------    Spec 1 Procedure: Biopsy    Spec 1 Clinical Impression: r/o sBCC vs AK    Spec 1 Source:  midline forehead    ------------------------: -------------------------    Spec 2 Procedure: Biopsy    Spec 2 Clinical Impression: r/o BCC    Spec 2 Source: right nasal tip    ------------------------: -------------------------    Spec 3 Procedure: Biopsy    Spec 3 Clinical Impression: r/o BCC    Spec 3 Source: right alar groove    Release to patient:           Neoplasm of uncertain behavior of skin  -     Specimen to Pathology, Dermatology    Shave biopsy procedure note x 3:  Risk, benefits, and alternatives of biopsy are discussed with the patient, including risk of infection, scar, recurrence, and need for additional treatment of site. The patient agrees to the procedure by verbal consent. The area is marked and prepped with alcohol.  Approximately 1 mL of lidocaine 1% with epinephrine is used for local anesthesia. A sharp blade is used to remove a portion of the lesion. The specimen is sent for pathology. Hemostasis is obtained with aluminum chloride and/or monopolar hyfrecation if needed. The area is then dressed and bandaged. The patient tolerated the procedure well without adverse event. Written instructions on wound care were given and were reviewed with the patient, who is to call for any signs of bleeding or infection. The patient will be notified of the pathology results.    Melanocytic nevi of face  Multiple benign melanocytic nevi of upper extremity, lower extremity, and trunk  Multiple benign-appearing nevi present on exam today. Reassurance provided. Counseled patient to periodically examine moles and return to clinic if any changes in size, shape, or color are noted or if it becomes symptomatic (bleeding, itching, pain, etc).  Recommend using a broad-spectrum, water-resistant sunscreen with SPF of 30 or higher--reapply every 2 hours. Seek shade, wear sun-protective clothing, and perform regular skin self-exams.    Lentigines  These are benign sun spots which should be monitored for changes. Daily sun  protection will reduce the number of new lesions.   Recommend using a broad-spectrum, water-resistant sunscreen with SPF of 30 or higher--reapply every 2 hours. Seek shade, wear sun-protective clothing, and perform regular skin self-exams.    History of melanoma in situ   - stable and chronic  An estimated 4% to 8% of patients with a history of melanoma develop a new primary melanoma, typically within the first 3 to 5 years following diagnosis. The risk of new primary melanoma is higher in the setting of increased nevus count, multiple clinically atypical/dysplastic nevi, family history of melanoma, fair skin/sun sensitivity, prior melanoma, and male sex.    Area of previous melanoma examined. Site well-healed with no signs of recurrence.  Total body skin examination performed today as noted in physical exam. Recommended continuing routine skin exams as well as routine checks with ophthalmology, dentist, and OB/GYN (if female) for any concerning lesions.  Recommend using a broad-spectrum, water-resistant sunscreen with SPF of 30 or higher--reapply every 2 hours. Seek shade, wear sun-protective clothing, and perform regular skin self-exams.    Screening exam for skin cancer  Total body skin examination performed today as noted in physical exam. Suspicious lesion(s) were noted and/or biopsied as above.  Recommend using a broad-spectrum, water-resistant sunscreen with SPF of 30 or higher--reapply every 2 hours. Seek shade, wear sun-protective clothing, and perform regular skin self-exams.      Follow up in about 6 months (around 9/16/2023) for follow up, or sooner dependent on pathology results.      Mary Jo Sotomayor MD, FAAD  [facility]

## 2023-03-16 NOTE — PATIENT INSTRUCTIONS
Biopsy Wound Care Instructions    Leave the bandage on for 24 hours without getting it wet.   Clean the area once a day with a gentle soap and water, then pat dry and apply Vaseline and a bandaid.  The site should be kept moist with Vaseline at all times to improve healing. Reapply a thick coating as needed. Do not let the site air out or form a scab, as this will delay healing and worsen scarring.  If any bleeding or oozing occurs once you return home, apply firm pressure to the area for 30 minutes straight without peeking. If bleeding continues, call the office immediately.  Please message us via [facility], call us at [phone], or return to the office at any sign of increasing redness, swelling, tenderness, pain, heat, yellow drainage/discharge, or continued bleeding.      Receiving Your Pathology Results    Your pathology results will be released to you on [facility] at the same time that Dr. Sotomayor receives them.   Dr. Sotomayor will then message you with her interpretation of the results and/or with the plan going forward.  If you do not use [facility] or if your pathology results require more of an explanation, you will receive your results via a phone call.  If 2 weeks go by and you have not received your results, please message us via [facility] or call us at [phone] to inform us.

## 2023-03-22 LAB
CRC RECOMMENDATION EXT: NORMAL
FINAL PATHOLOGIC DIAGNOSIS: NORMAL
GROSS: NORMAL
Lab: NORMAL
MICROSCOPIC EXAM: NORMAL

## 2023-03-23 ENCOUNTER — TELEPHONE (OUTPATIENT)
Dept: DERMATOLOGY | Facility: CLINIC | Age: 68
End: 2023-03-23
Payer: MEDICARE

## 2023-03-23 NOTE — PROGRESS NOTES
Spoke with patient regarding pathology results and treatment options.  Pt chooses Mohs surgery for all 3 skin cancers.  Will refer to Dr. Panda for Mohs.     Final Pathologic Diagnosis   1. Skin, midline forehead, shave biopsy:   -SQUAMOUS CELL CARCINOMA IN-SITU (PARTIALLY INVOLVING HAIR FOLLICLES), EXTENDING TO THE PERIPHERAL BIOPSY EDGES   2. Skin, right nasal tip, shave biopsy:   -BASAL CELL CARCINOMA, NODULAR AND SUPERFICIAL TYPE, EXTENDING TO THE PERIPHERAL AND DEEP BIOPSY EDGES   3. Skin, right alar groove, shave biopsy:   -BASAL CELL CARCINOMA, NODULAR TYPE, EXTENDING TO THE DEEP BIOPSY EDGE

## 2023-03-23 NOTE — TELEPHONE ENCOUNTER
Spoke with patient regarding pathology results and treatment options.  Pt chooses Mohs for all 3 skin cancers.  Will refer to Dr. Panda.     Final Pathologic Diagnosis 1. Skin, midline forehead, shave biopsy:   -SQUAMOUS CELL CARCINOMA IN-SITU (PARTIALLY INVOLVING HAIR FOLLICLES), EXTENDING TO THE PERIPHERAL BIOPSY EDGES   2. Skin, right nasal tip, shave biopsy:   -BASAL CELL CARCINOMA, NODULAR AND SUPERFICIAL TYPE, EXTENDING TO THE PERIPHERAL AND DEEP BIOPSY EDGES   3. Skin, right alar groove, shave biopsy:   -BASAL CELL CARCINOMA, NODULAR TYPE, EXTENDING TO THE DEEP BIOPSY EDGE

## 2023-04-19 ENCOUNTER — TELEPHONE (OUTPATIENT)
Dept: DERMATOLOGY | Facility: CLINIC | Age: 68
End: 2023-04-19
Payer: MEDICARE

## 2023-04-19 NOTE — TELEPHONE ENCOUNTER
----- Message from Cayla Noriega sent at 4/19/2023 12:30 PM CDT -----  Contact: self/[phone]  Patient is requesting  that Dr Panda work on all three of my Basel cell skin spots at once rather than waiting a month to do it in two appointments. We have elder care obligations in June-Aug. that are urgent. Please call her back at [phone]. Thanks/ar

## 2023-04-19 NOTE — TELEPHONE ENCOUNTER
LM for pt that we cannot do more than 2 sites at one time but to call back if she has other questions.

## 2023-04-26 ENCOUNTER — PROCEDURE VISIT (OUTPATIENT)
Dept: OBSTETRICS AND GYNECOLOGY | Facility: CLINIC | Age: 68
End: 2023-04-26
Payer: MEDICARE

## 2023-04-26 VITALS
WEIGHT: 199 LBS | SYSTOLIC BLOOD PRESSURE: 108 MMHG | HEIGHT: 71 IN | DIASTOLIC BLOOD PRESSURE: 60 MMHG | BODY MASS INDEX: 27.86 KG/M2

## 2023-04-26 DIAGNOSIS — Z12.4 SCREENING FOR CERVICAL CANCER: ICD-10-CM

## 2023-04-26 DIAGNOSIS — Z76.89 ENCOUNTER FOR BIOPSY: Primary | ICD-10-CM

## 2023-04-26 DIAGNOSIS — N95.0 PMB (POSTMENOPAUSAL BLEEDING): ICD-10-CM

## 2023-04-26 LAB
B-HCG UR QL: NEGATIVE
CTP QC/QA: YES

## 2023-04-26 PROCEDURE — 88305 TISSUE EXAM BY PATHOLOGIST: CPT | Performed by: PATHOLOGY

## 2023-04-26 PROCEDURE — 87624 HPV HI-RISK TYP POOLED RSLT: CPT | Performed by: OBSTETRICS & GYNECOLOGY

## 2023-04-26 PROCEDURE — 88175 CYTOPATH C/V AUTO FLUID REDO: CPT | Performed by: OBSTETRICS & GYNECOLOGY

## 2023-04-26 PROCEDURE — 58100 BIOPSY OF UTERUS LINING: CPT | Mod: S$GLB,,, | Performed by: OBSTETRICS & GYNECOLOGY

## 2023-04-26 PROCEDURE — 58100 ENDOMETRIAL BIOPSY: ICD-10-PCS | Mod: S$GLB,,, | Performed by: OBSTETRICS & GYNECOLOGY

## 2023-04-26 PROCEDURE — 81025 URINE PREGNANCY TEST: CPT | Mod: S$GLB,,, | Performed by: OBSTETRICS & GYNECOLOGY

## 2023-04-26 PROCEDURE — 88305 TISSUE EXAM BY PATHOLOGIST: CPT | Mod: 26,,, | Performed by: PATHOLOGY

## 2023-04-26 PROCEDURE — 88305 TISSUE EXAM BY PATHOLOGIST: ICD-10-PCS | Mod: 26,,, | Performed by: PATHOLOGY

## 2023-04-26 PROCEDURE — 99499 NO LOS: ICD-10-PCS | Mod: S$GLB,,, | Performed by: OBSTETRICS & GYNECOLOGY

## 2023-04-26 PROCEDURE — 99499 UNLISTED E&M SERVICE: CPT | Mod: S$GLB,,, | Performed by: OBSTETRICS & GYNECOLOGY

## 2023-04-26 PROCEDURE — 81025 POCT URINE PREGNANCY: ICD-10-PCS | Mod: S$GLB,,, | Performed by: OBSTETRICS & GYNECOLOGY

## 2023-04-26 NOTE — PROCEDURES
Endometrial biopsy    Date/Time: 4/26/2023 2:00 PM  Performed by: Meche Wynn MD  Authorized by: Meche Wynn MD     Consent:     Consent obtained:  Written    Consent given by:  Patient    Patient questions answered: yes      Patient agrees, verbalizes understanding, and wants to proceed: yes      Educational handouts given: yes      Instructions and paperwork completed: yes    Indication:     Indications: Post-menopausal bleeding      Chronicity of post-menopausal bleeding:  Recurrent    Progression of post-menopausal bleeding:  Partially resolved  Pre-procedure:     Pre-procedure timeout performed: yes    Procedure:     Procedure: endometrial biopsy with Pipelle      Cervix cleaned and prepped: yes      A paracervical block was performed: yes      An intracervical block was performed: no      The cervix was dilated: yes      Uterus sounded: yes      Uterus sound depth (cm):  7    Specimen collected: specimen collected and sent to pathology      Patient tolerated procedure well with no complications: yes    Comments:     Procedure comments:  Several passes made with minimal tissue.   Discussed option of hysterectomy due to recurrent PMB. She will consider, they are traveling over the summer.   Pap/HPV due and collected prior to procedure.   Bleeding precautions.

## 2023-05-02 DIAGNOSIS — C44.311 BASAL CELL CARCINOMA OF RIGHT NASAL TIP: Primary | ICD-10-CM

## 2023-05-02 LAB
FINAL PATHOLOGIC DIAGNOSIS: NORMAL
FINAL PATHOLOGIC DIAGNOSIS: NORMAL
GROSS: NORMAL
HPV HR 12 DNA SPEC QL NAA+PROBE: NEGATIVE
HPV16 AG SPEC QL: NEGATIVE
HPV18 DNA SPEC QL NAA+PROBE: NEGATIVE
Lab: NORMAL
Lab: NORMAL

## 2023-05-03 ENCOUNTER — PATIENT MESSAGE (OUTPATIENT)
Dept: OBSTETRICS AND GYNECOLOGY | Facility: CLINIC | Age: 68
End: 2023-05-03
Payer: MEDICARE

## 2023-05-10 NOTE — PROGRESS NOTES
CC: right hip pain    Elise is here today for follow up evaluation of her right hip pain. Patient reports her pain is 3/10 today. She had right hip GTB CSI at visit 11/03/2022 and admits to appreciating great improvement in her pain until February without new mechanism of injury.  She is now having difficulty with going up and down stairs and has pain with walking.  When asked where she hurts she gestures to the lateral aspect of the right hip. She admits to remaining compliant with her HEP without any substantial improvement following the exercises.      Recall from visit on 11/03/2022  Elise is here today for follow up evaluation of her right hip pain. She reports her pain is 7/10 today. She admits to improved pain with Medrol Dosepack but feels as though her pain has increased since her last visit without new mechanism of injury. When asked where she hurts today she gestures to the lateral aspect of the right hip.     Recall from visit on 09/27/2022  67 y.o. Female presents today for evaluation of her right hip pain. This is acute on chronic, exacerbated over the summer in July while hiking after years of not doing so. Pain is over anterior and lateral hip and will occasionally radiate down lateral right leg just beyond knee. She denies back pain radiation, weakness, numbness, or tingling. Described as deep ache and is worse with hip extension loading. She has grappled with IT band syndrome on this side before and endorses slacking off on her usual consistent IT band rolling exercises. She states ASA and NSAIDs have helped. She denies recent infections, swelling, skin changes.   How long: subacute, 3 months  What makes it better: rest, stretching  What makes it worse: loading and exertion through hip  Does it radiate: yes, distally down lateral thigh  Attempted treatments: oral NSAIDs  Pain score: 7/10  Any mechanical symptoms: Denies  Feelings of instability: None  Affecting ADLs: Yes      PAST MEDICAL HISTORY:    Past Medical History:   Diagnosis Date    Colon polyp 2008    Disorder of kidney and ureter     Endometriosis     History of melanoma in situ 4/17/2018    Left arm, s/p excision by Alexandre Gomez MD    Hypothyroidism     dx in her 40s, enlarged thyroid, did have FNA of thyroid nodule which was benign    Melanoma 1990, 2018 1990s - on back; 2018 - L upper arm    Miscarriage        PAST SURGICAL HISTORY:   Past Surgical History:   Procedure Laterality Date    BACK SURGERY      DILATION AND CURETTAGE OF UTERUS      History of polyps    EXPLORATORY LAPAROTOMY WITH UTERINE MYOMECTOMY      FNA thyroid      in her 40s    MALIGNANT SKIN LESION EXCISION      1990s, 2018    SALPINGOOPHORECTOMY      WISDOM TOOTH EXTRACTION         FAMILY HISTORY:   Family History   Problem Relation Age of Onset    Hypertension Father     Breast cancer Sister     Bone cancer Sister     Parkinsonism Sister     Multiple sclerosis Sister     Lymphoma Maternal Aunt     Arthritis Mother     Chronic back pain Brother     Colon cancer Neg Hx     Diabetes Neg Hx     Miscarriages / Stillbirths Neg Hx     Stroke Neg Hx     Melanoma Neg Hx        SOCIAL HISTORY:   Social History     Socioeconomic History    Marital status:    Tobacco Use    Smoking status: Never    Smokeless tobacco: Never   Substance and Sexual Activity    Alcohol use: Yes     Comment: 2-3 monthly    Drug use: Yes     Types: Marijuana    Sexual activity: Yes     Partners: Male   Other Topics Concern    Are you pregnant or think you may be? No    Breast-feeding No   Social History Narrative    Doing water aerobic three times weekly. Also walks.        MEDICATIONS:     Current Outpatient Medications:     acetaminophen (TYLENOL) 500 MG tablet, Take 500 mg by mouth every evening., Disp: , Rfl:     cetirizine (ZYRTEC) 10 MG tablet, Take 10 mg by mouth once daily., Disp: , Rfl:     cholecalciferol, vitamin D3, (VITAMIN D3) 100 mcg (4,000 unit) Cap, Take 1 capsule by mouth., Disp: ,  "Rfl:     estradioL-levonorgestreL (CLIMARA PRO) 0.045-0.015 mg/24 hr, Place 1 patch onto the skin once a week., Disp: 4 patch, Rfl: 12    levothyroxine (SYNTHROID) 88 MCG tablet, Take 1 tablet (88 mcg total) by mouth before breakfast., Disp: 90 tablet, Rfl: 3    SUTAB 1.479-0.188- 0.225 gram tablet, SMARTSI Tablet(s) By Mouth As Directed, Disp: , Rfl:     traZODone (DESYREL) 50 MG tablet, TAKE 1 TABLET(50 MG) BY MOUTH NIGHTLY AS NEEDED FOR INSOMNIA, Disp: 90 tablet, Rfl: 3    meloxicam (MOBIC) 15 MG tablet, Take 1 tablet (15 mg total) by mouth once daily., Disp: 30 tablet, Rfl: 0    ALLERGIES:   Review of patient's allergies indicates:   Allergen Reactions    Iodinated contrast media         PHYSICAL EXAMINATION:  /79   Pulse 89   Ht 5' 11" (1.803 m)   LMP 2020   BMI 27.75 kg/m²   Vitals signs and nursing note have been reviewed.  General: In no acute distress, well developed, well nourished, no diaphoresis  Eyes: EOM full and smooth, no eye redness or discharge  HENT: normocephalic and atraumatic, neck supple, trachea midline, no nasal discharge, no external ear redness or discharge  Cardiovascular: no LE edema  Lungs: respirations non-labored, no conversational dyspnea   Abd: non-distended, no rigidity  MSK: no amputation or deformity, no swelling of extremities  Neuro: AAOx3, CN2-12 grossly intact  Skin: No rashes, warm and dry  Psychiatric: cooperative, pleasant, mood and affect appropriate for age    HIP: RIGHT  The affected hip is compared to the contralateral hip.    Observation:    There is no edema, erythema, or ecchymosis in the lumbosacral region.   No obvious pelvic obliquity while standing.    No thoracolumbar scoliosis observed.    No midline skin abnormalities.  No atrophy noted in the lower limbs.  Right antalgic gait is noted.     ROM:   Passive hip flexion to 120° on left and 120° on right.    Passive hip internal rotation to 45° on left and 45° on right.    Passive hip external " rotation to 45° on left and 45° on right.    Passive hip abduction to 45° on left and 45° on right.    All motions above are without pain.    Tenderness To Palpation:  No tenderness at the ASIS, AIIS, PSIS, PIIS, iliac crest, pubic bones, ischial tuberosity.  + tenderness directly over the greater trochanter  + tenderness over the glute attachments on the greater trochanter.  No tenderness over hip flexor musculature.    Strength Testing:  Hip flexion - 5/5 on left and 5/5 on right  Hip extension - 5/5 on left and 5/5 on right  Hip adduction - 5/5 on left and 5/5 on right  Hip abduction - 5/5 on left and 5/5 on right  Knee flexion - 5/5 on left and 5/5 on right  Knee extension - 5/5 on left and 5/5 on right    Special Tests:  Log roll - negative  ROXANNE - positive pain at lateral hip  FADIR - negative  Scour test - negative  No pain with posterior hip capsule compression    ASIS compression test - negative  SI drawer test - negative   Thigh thrust test - negative     Radha test - positive  Matthew compression test - positive    Neurovascular Exam:  Normal gait without Trendelenburg.  2+ femoral, DP, and PT pulses BL.  No skin changes, no abnormal hair distribution.  Sensation intact to light touch throughout the obturator and medial/lateral/posterior femoral cutaneous nerves.  Capillary refill intact to <2 seconds in all lower limb digits.      IMAGIN. X-ray obtained 2022 due to bilateral low back pain  2. X-ray images were reviewed personally by me and then directly with patient.  3. FINDINGS: X-ray images obtained demonstrate mild curvature of the lumbar spine.  The vertebral bodies are normal in height.  Minimal retrolisthesis present at L3-4.  Remaining vertebral bodies are normally aligned.  There is multilevel disc space narrowing and mild osteophytic spurring along vertebral endplates.  There are facet degenerative changes.  There is no significant instability with flexion or extension.  4. IMPRESSION:   Multilevel lumbar spondylosis and DDD with minimal retrolisthesis present at L3-4. There is no significant instability with flexion or extension.      ASSESSMENT:      ICD-10-CM ICD-9-CM   1. Pain of right hip  M25.551 719.45   2. Greater trochanteric pain syndrome of right lower extremity  M25.551 719.45           PLAN:  1-2.  Lateral right hip pain/greater trochanteric pain syndrome - improved, now worsening    - Elise presents for follow-up on her right lateral hip pain. She has responded well to OMT and HEP with IT foam roller in the past. She had right hip GTB CSI at visit 11/03/2022 and admits to appreciating improvement in her pain following until February 2023. She has remained compliant with her HEP.      - Her pain has persisted even with a full course of physical therapy, OMT, injections, medications.  In order to help navigate next steps, an MRI of the right hip has been ordered.    - Mobic 15 mg to be taken daily for 2 weeks followed by as needed.     - Continue with previously prescribed HEP for core and pelvic strengthening and stabilizing prescribed at initial visit.      Future planning includes - next steps pending MRI results, possible GTB CSI, possibly OMT if indicated    All questions were answered to the best of my ability and all concerns were addressed at this time.    Follow up after MRI for results and next steps.       This note is dictated using the M*Modal Fluency Direct word recognition program. There are word recognition mistakes that are occasionally missed on review.      Total time spent face-to face with patient counseling or coordinating care including prognosis, differential diagnosis, risks and benefits of treatment, instructions, compliance risk reductions as well as non-face-to-face time personally spent reviewing medial record, medical documentation, and coordination of care.     EST MINUTES X   02128 10-19    99331 20-29    [id number] 30-39 X   99215 40-54    NEW     55191 15-29     63158 30-44    [id number] 45-59    [id number] 60-74    PHONE      5-10    [id number] 11-20    [id number] 21-30

## 2023-05-12 ENCOUNTER — OFFICE VISIT (OUTPATIENT)
Dept: SPORTS MEDICINE | Facility: CLINIC | Age: 68
End: 2023-05-12
Payer: MEDICARE

## 2023-05-12 VITALS
SYSTOLIC BLOOD PRESSURE: 137 MMHG | HEART RATE: 89 BPM | DIASTOLIC BLOOD PRESSURE: 79 MMHG | HEIGHT: 71 IN | BODY MASS INDEX: 27.75 KG/M2

## 2023-05-12 DIAGNOSIS — M25.551 PAIN OF RIGHT HIP: Primary | ICD-10-CM

## 2023-05-12 DIAGNOSIS — M25.551 GREATER TROCHANTERIC PAIN SYNDROME OF RIGHT LOWER EXTREMITY: ICD-10-CM

## 2023-05-12 PROCEDURE — 99999 PR PBB SHADOW E&M-EST. PATIENT-LVL III: CPT | Mod: PBBFAC,,, | Performed by: ORTHOPAEDIC SURGERY

## 2023-05-12 PROCEDURE — 1159F MED LIST DOCD IN RCRD: CPT | Mod: CPTII,S$GLB,, | Performed by: ORTHOPAEDIC SURGERY

## 2023-05-12 PROCEDURE — 1101F PR PT FALLS ASSESS DOC 0-1 FALLS W/OUT INJ PAST YR: ICD-10-PCS | Mod: CPTII,S$GLB,, | Performed by: ORTHOPAEDIC SURGERY

## 2023-05-12 PROCEDURE — 3075F SYST BP GE 130 - 139MM HG: CPT | Mod: CPTII,S$GLB,, | Performed by: ORTHOPAEDIC SURGERY

## 2023-05-12 PROCEDURE — 1125F PR PAIN SEVERITY QUANTIFIED, PAIN PRESENT: ICD-10-PCS | Mod: CPTII,S$GLB,, | Performed by: ORTHOPAEDIC SURGERY

## 2023-05-12 PROCEDURE — 99214 PR OFFICE/OUTPT VISIT, EST, LEVL IV, 30-39 MIN: ICD-10-PCS | Mod: S$GLB,,, | Performed by: ORTHOPAEDIC SURGERY

## 2023-05-12 PROCEDURE — 99214 OFFICE O/P EST MOD 30 MIN: CPT | Mod: S$GLB,,, | Performed by: ORTHOPAEDIC SURGERY

## 2023-05-12 PROCEDURE — 3075F PR MOST RECENT SYSTOLIC BLOOD PRESS GE 130-139MM HG: ICD-10-PCS | Mod: CPTII,S$GLB,, | Performed by: ORTHOPAEDIC SURGERY

## 2023-05-12 PROCEDURE — 3288F PR FALLS RISK ASSESSMENT DOCUMENTED: ICD-10-PCS | Mod: CPTII,S$GLB,, | Performed by: ORTHOPAEDIC SURGERY

## 2023-05-12 PROCEDURE — 1125F AMNT PAIN NOTED PAIN PRSNT: CPT | Mod: CPTII,S$GLB,, | Performed by: ORTHOPAEDIC SURGERY

## 2023-05-12 PROCEDURE — 1160F RVW MEDS BY RX/DR IN RCRD: CPT | Mod: CPTII,S$GLB,, | Performed by: ORTHOPAEDIC SURGERY

## 2023-05-12 PROCEDURE — 99999 PR PBB SHADOW E&M-EST. PATIENT-LVL III: ICD-10-PCS | Mod: PBBFAC,,, | Performed by: ORTHOPAEDIC SURGERY

## 2023-05-12 PROCEDURE — 3288F FALL RISK ASSESSMENT DOCD: CPT | Mod: CPTII,S$GLB,, | Performed by: ORTHOPAEDIC SURGERY

## 2023-05-12 PROCEDURE — 3078F PR MOST RECENT DIASTOLIC BLOOD PRESSURE < 80 MM HG: ICD-10-PCS | Mod: CPTII,S$GLB,, | Performed by: ORTHOPAEDIC SURGERY

## 2023-05-12 PROCEDURE — 3008F BODY MASS INDEX DOCD: CPT | Mod: CPTII,S$GLB,, | Performed by: ORTHOPAEDIC SURGERY

## 2023-05-12 PROCEDURE — 1160F PR REVIEW ALL MEDS BY PRESCRIBER/CLIN PHARMACIST DOCUMENTED: ICD-10-PCS | Mod: CPTII,S$GLB,, | Performed by: ORTHOPAEDIC SURGERY

## 2023-05-12 PROCEDURE — 3008F PR BODY MASS INDEX (BMI) DOCUMENTED: ICD-10-PCS | Mod: CPTII,S$GLB,, | Performed by: ORTHOPAEDIC SURGERY

## 2023-05-12 PROCEDURE — 1159F PR MEDICATION LIST DOCUMENTED IN MEDICAL RECORD: ICD-10-PCS | Mod: CPTII,S$GLB,, | Performed by: ORTHOPAEDIC SURGERY

## 2023-05-12 PROCEDURE — 1101F PT FALLS ASSESS-DOCD LE1/YR: CPT | Mod: CPTII,S$GLB,, | Performed by: ORTHOPAEDIC SURGERY

## 2023-05-12 PROCEDURE — 3078F DIAST BP <80 MM HG: CPT | Mod: CPTII,S$GLB,, | Performed by: ORTHOPAEDIC SURGERY

## 2023-05-12 RX ORDER — MELOXICAM 15 MG/1
15 TABLET ORAL DAILY
Qty: 30 TABLET | Refills: 0 | Status: SHIPPED | OUTPATIENT
Start: 2023-05-12 | End: 2023-05-22 | Stop reason: SDUPTHER

## 2023-05-17 ENCOUNTER — PROCEDURE VISIT (OUTPATIENT)
Dept: DERMATOLOGY | Facility: CLINIC | Age: 68
End: 2023-05-17
Payer: MEDICARE

## 2023-05-17 VITALS
SYSTOLIC BLOOD PRESSURE: 148 MMHG | BODY MASS INDEX: 27.86 KG/M2 | DIASTOLIC BLOOD PRESSURE: 87 MMHG | WEIGHT: 199 LBS | HEIGHT: 71 IN | HEART RATE: 58 BPM

## 2023-05-17 DIAGNOSIS — D04.39 SQUAMOUS CELL CARCINOMA IN SITU OF SKIN OF FOREHEAD: ICD-10-CM

## 2023-05-17 DIAGNOSIS — C44.311 BASAL CELL CARCINOMA OF NASAL TIP: Primary | ICD-10-CM

## 2023-05-17 PROCEDURE — 13132 PR RECMPL WND HEAD,FAC,HAND 2.6-7.5 CM: ICD-10-PCS | Mod: 51,S$GLB,, | Performed by: DERMATOLOGY

## 2023-05-17 PROCEDURE — 13152 PR RECMPL WND LID,NOS,EAR 2.6-7.5 CM: ICD-10-PCS | Mod: XS,51,S$GLB, | Performed by: DERMATOLOGY

## 2023-05-17 PROCEDURE — 13152 CMPLX RPR E/N/E/L 2.6-7.5 CM: CPT | Mod: XS,51,S$GLB, | Performed by: DERMATOLOGY

## 2023-05-17 PROCEDURE — 99499 UNLISTED E&M SERVICE: CPT | Mod: S$GLB,,, | Performed by: DERMATOLOGY

## 2023-05-17 PROCEDURE — 17311 MOHS 1 STAGE H/N/HF/G: CPT | Mod: S$GLB,,, | Performed by: DERMATOLOGY

## 2023-05-17 PROCEDURE — 17311: ICD-10-PCS | Mod: S$GLB,,, | Performed by: DERMATOLOGY

## 2023-05-17 PROCEDURE — 17312: ICD-10-PCS | Mod: S$GLB,,, | Performed by: DERMATOLOGY

## 2023-05-17 PROCEDURE — 99499 NO LOS: ICD-10-PCS | Mod: S$GLB,,, | Performed by: DERMATOLOGY

## 2023-05-17 PROCEDURE — 17312 MOHS ADDL STAGE: CPT | Mod: S$GLB,,, | Performed by: DERMATOLOGY

## 2023-05-17 PROCEDURE — 13132 CMPLX RPR F/C/C/M/N/AX/G/H/F: CPT | Mod: 51,S$GLB,, | Performed by: DERMATOLOGY

## 2023-05-17 RX ORDER — CEPHALEXIN 500 MG/1
500 CAPSULE ORAL 3 TIMES DAILY
Qty: 21 CAPSULE | Refills: 0 | Status: SHIPPED | OUTPATIENT
Start: 2023-05-17 | End: 2023-05-24

## 2023-05-17 RX ORDER — HYDROCODONE BITARTRATE AND ACETAMINOPHEN 5; 325 MG/1; MG/1
1 TABLET ORAL EVERY 6 HOURS PRN
Qty: 10 TABLET | Refills: 0 | Status: SHIPPED | OUTPATIENT
Start: 2023-05-17 | End: 2023-05-22

## 2023-05-17 NOTE — PROGRESS NOTES
PROCEDURE: Mohs' Micrographic Surgery    INDICATION: Location in non-mask areas of face where maximum conservation of tumor-free tissue is needed. Biopsy-proven skin cancer of cosmetically and functionally important areas, including head, neck, genital, hand, foot, or areas known for having difficulty in healing, such as the lower anterior legs. Tumor with ill-defined borders. In patient with proven history of difficult or aggressive skin cancer.    REFERRING PROVIDER: Mary Jo Sotomayor M.D.    CASE NUMBER:     ANESTHETIC: 3.5 cc 0.5% Bupivacaine with Epi 1:200,000 mixed 1:1 with plain 1% Lidocaine    SURGICAL PREP: Hibiclens    SURGEON: Marika Panda MD    ASSISTANTS: Sherrell Crews PA-C, Charleen Tovar MA, and Fior Gusman, Surg Tech    PREOPERATIVE DIAGNOSIS: squamous cell carcinoma in situ    POSTOPERATIVE DIAGNOSIS: squamous cell carcinoma in situ    PATHOLOGIC DIAGNOSIS: squamous cell carcinoma in situ    HISTOLOGY OF SPECIMENS IN FIRST STAGE:   Debulking tumor confirms squamous cell carcinoma in situ.  Tumor Type: Tumor seen. Squamous cell carcinoma in situ: Epidermis with full-thickness atypia and variable epidermal maturation.   Depth of Invasion: epidermis  Perineural Invasion: No    HISTOLOGY OF SPECIMENS IN SUBSEQUENT STAGES:  Tumor Type: No tumor seen. Hypertrophic actinic keratosis: Keratinocyte atypia along the basal layer.    STAGES OF MOHS' SURGERY PERFORMED: 2    TUMOR-FREE PLANE ACHIEVED: Yes    HEMOSTASIS: electrocoagulation     SPECIMENS: 3 (2 in stage A and 1 in stage B)    LOCATION: midline forehead. Location verified with Dr. Sotomayor's clinical photograph. Patient also verified location with hand held mirror.    INITIAL LESION SIZE: 0.5 x 0.6 cm    FINAL DEFECT SIZE: 1.0 x 1.1 cm    WOUND REPAIR/DISPOSITION: The patient tolerated Mohs' Micrographic Surgery for a squamous cell carcinoma in situ very well. When the tumor was completely removed, a repair of the surgical defect was  undertaken.    PROCEDURE: Complex Linear Repair    INDICATION: Status post Mohs' Micrographic Surgery for squamous cell carcinoma in situ.    CASE NUMBER:     SURGEON: Marika Panda MD    ASSISTANTS: Sherrell Crews PA-C and Charleen Tovar MA    ANESTHETIC: 2 cc 0.5% Bupivacaine with Epi 1:200,000 mixed 1:1 with plain 1% Lidocaine    SURGICAL PREP: Hibiclens, prepped by Charleen Tovar MA    LOCATION: midline forehead    DEFECT SIZE: 1.0 x 1.1 cm    WOUND REPAIR/DISPOSITION:  After the patient's carcinoma had been completely removed with Mohs' Micrographic Surgery, a repair of the surgical defect was undertaken. The patient was returned to the operating suite where the area of midline forehead was prepped, draped, and anesthetized in the usual sterile fashion. The wound was widely undermined in all directions. The wound was undermined to a distance at least the maximum width of the defect as measured perpendicular to the closure line along at least one entire edge of the defect, in this case 2 cm. Then, electrocoagulation was used to obtain meticulous hemostasis. 5-0 Vicryl buried vertical mattress sutures were placed into the subcutaneous and dermal plane to close the wound and kerry the cutaneous wound edge. Bilateral dog ears were identified and were removed by a standard Burow's triangle technique. The cutaneous wound edges were closed using running 5-0 Prolene suture.    The patient tolerated the procedure well.    The area was cleaned and dressed appropriately and the patient was given wound care instructions, as well as appointment for follow-up evaluation and suture removal in 7 days. Patient was placed on Norco 5-325 mg prn postop pain and Keflex 500 mg TID x 7 days.    LENGTH OF REPAIR: 3 cm    Vitals:    05/17/23 0750 05/17/23 1249   BP: 120/79 (!) 148/87   BP Location: Right arm    Patient Position: Sitting    BP Method: Medium (Automatic)    Pulse: 72 (!) 58   Weight: 90.3 kg (199 lb)    Height: 5'  "11" (1.803 m)        PROCEDURE: Mohs' Micrographic Surgery    INDICATION: Location in mask areas of face including central face, nose, eyelids, eyebrows, lips, chin, preauricular, temple, and ear. Biopsy-proven skin cancer of cosmetically and functionally important areas, including head, neck, genital, hand, foot, or areas known for having difficulty in healing, such as the lower anterior legs. Tumor with ill-defined borders. In patient with proven history of difficult or aggressive skin cancer.    REFERRING PROVIDER: Mary Jo Sotomayor M.D.    CASE NUMBER:     ANESTHETIC: 3 cc 0.5% Bupivacaine with Epi 1:200,000 mixed 1:1 with plain 1% Lidocaine    SURGICAL PREP: Hibiclens    SURGEON: Marika Panda MD    ASSISTANTS: Sherrell Crews PA-C, Charleen Tovar MA, and Fior Gusman, Surg Tech    PREOPERATIVE DIAGNOSIS: basal cell carcinoma- superficial, nodular    POSTOPERATIVE DIAGNOSIS: basal cell carcinoma- superficial, nodular    PATHOLOGIC DIAGNOSIS: basal cell carcinoma- superficial, nodular    HISTOLOGY OF SPECIMENS IN FIRST STAGE:   Debulking tumor confirms nodular basal cell carcinoma.  Tumor Type: Tumor seen. Superficial basal cell carcinoma: Foci of basaloid cells with peripheral palisading and focal retraction artifact arising along the dermoepidermal junction and extending into the papillary dermis.  Nodular basal cell carcinoma: Nodular tumor in dermis composed of basaloid cells exhibiting peripheral palisading and retraction artifact.   Depth of Invasion: epidermis and dermis  Perineural Invasion: No    HISTOLOGY OF SPECIMENS IN SUBSEQUENT STAGES:  Tumor Type: Tumor seen. Same as in first stage.   Depth of Invasion: epidermis and dermis  Perineural Invasion: No    STAGES OF MOHS' SURGERY PERFORMED: 4    TUMOR-FREE PLANE ACHIEVED: Yes    HEMOSTASIS: electrocoagulation     SPECIMENS: 6 (2 in stage A, 2 in stage B, 1 in stage C, and 1 in stage D)    LOCATION: right nasal tip. Location verified with Dr." Светлана's clinical photograph. Patient also verified location with hand held mirror.    INITIAL LESION SIZE: 0.3 x 0.3 cm    FINAL DEFECT SIZE: 1.0 x 1.1 cm    WOUND REPAIR/DISPOSITION: The patient tolerated Mohs' Micrographic Surgery for a basal cell carcinoma very well. When the tumor was completely removed, a repair of the surgical defect was undertaken.    PROCEDURE: Complex Linear Repair    INDICATION: Status post Mohs' Micrographic Surgery for basal cell carcinoma.    CASE NUMBER:     SURGEON: Marika Panda MD    ASSISTANTS: Charleen Tovar MA    ANESTHETIC: 2 cc 1% Lidocaine with Epinephrine 1:100,000    SURGICAL PREP: Hibiclens, prepped by Charleen Tovar MA    LOCATION: right nasal tip    DEFECT SIZE: 1.0 x 1.1 cm    WOUND REPAIR/DISPOSITION:  After the patient's carcinoma had been completely removed with Mohs' Micrographic Surgery, a repair of the surgical defect was undertaken. The patient was returned to the operating suite where the area of right nasal tip was prepped, draped, and anesthetized in the usual sterile fashion. The wound was widely undermined in all directions. The wound was undermined to a distance at least the maximum width of the defect as measured perpendicular to the closure line along at least one entire edge of the defect, in this case 2 cm. Then, electrocoagulation was used to obtain meticulous hemostasis. 4-0 Vicryl buried vertical mattress sutures were placed into the subcutaneous and dermal plane to close the wound and kerry the cutaneous wound edge. Bilateral dog ears were identified and were removed by a standard Burow's triangle technique. The cutaneous wound edges were closed using interrupted 5-0 Prolene suture.    The patient tolerated the procedure well.    The area was cleaned and dressed appropriately and the patient was given wound care instructions, as well as appointment for follow-up evaluation and suture removal in 7 days. Patient was placed on Norco 5-325 mg  "prn postop pain and Keflex 500 mg TID x 7 days.    LENGTH OF REPAIR: 2.8 cm    Vitals:    05/17/23 0750 05/17/23 1249   BP: 120/79 (!) 148/87   BP Location: Right arm    Patient Position: Sitting    BP Method: Medium (Automatic)    Pulse: 72 (!) 58   Weight: 90.3 kg (199 lb)    Height: 5' 11" (1.803 m)        "

## 2023-05-19 ENCOUNTER — HOSPITAL ENCOUNTER (OUTPATIENT)
Dept: RADIOLOGY | Facility: OTHER | Age: 68
Discharge: HOME | End: 2023-05-19
Attending: ORTHOPAEDIC SURGERY
Payer: MEDICARE

## 2023-05-19 DIAGNOSIS — M25.551 PAIN OF RIGHT HIP: ICD-10-CM

## 2023-05-19 PROCEDURE — 73721 MRI HIP WITHOUT CONTRAST RIGHT: ICD-10-PCS | Mod: 26,RT,, | Performed by: RADIOLOGY

## 2023-05-19 PROCEDURE — 73721 MRI JNT OF LWR EXTRE W/O DYE: CPT | Mod: 26,RT,, | Performed by: RADIOLOGY

## 2023-05-19 PROCEDURE — 73721 MRI JNT OF LWR EXTRE W/O DYE: CPT | Mod: TC,RT

## 2023-05-19 NOTE — PROGRESS NOTES
CC: right hip pain    Elise is here today for follow up evaluation of her right hip pain and to discuss her MRI results. Patient reports her pain is 0/10 today and admits to feeling 90% improved which she attributes to taking Mobic 15 mg as prescribed. She denies new falls or trauma since her last visit. She did have midline forehead squamous cell carcinoma and right nasal tip basal cell carcinoma removed 05/17/2023.      Recall from visit on 05/12/2023  Elise is here today for follow up evaluation of her right hip pain. Patient reports her pain is 3/10 today. She had right hip GTB CSI at visit 11/03/2022 and admits to appreciating great improvement in her pain until February without new mechanism of injury.  She is now having difficulty with going up and down stairs and has pain with walking.  When asked where she hurts she gestures to the lateral aspect of the right hip. She admits to remaining compliant with her HEP without any substantial improvement following the exercises.        PAST MEDICAL HISTORY:   Past Medical History:   Diagnosis Date    Basal cell carcinoma of nasal tip 05/17/2023    R nasal tip    Colon polyp 2008    Disorder of kidney and ureter     Endometriosis     History of melanoma in situ 04/17/2018    Left arm, s/p excision by Alexandre Gomez MD    Hypothyroidism     dx in her 40s, enlarged thyroid, did have FNA of thyroid nodule which was benign    Melanoma 1990, 2018 1990s - on back; 2018 - L upper arm    Miscarriage     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    midline forehead       PAST SURGICAL HISTORY:   Past Surgical History:   Procedure Laterality Date    BACK SURGERY      DILATION AND CURETTAGE OF UTERUS      History of polyps    EXPLORATORY LAPAROTOMY WITH UTERINE MYOMECTOMY      FNA thyroid      in her 40s    MALIGNANT SKIN LESION EXCISION      1990s, 2018    SALPINGOOPHORECTOMY      WISDOM TOOTH EXTRACTION         FAMILY HISTORY:   Family History    Problem Relation Age of Onset    Hypertension Father     Breast cancer Sister     Bone cancer Sister     Parkinsonism Sister     Multiple sclerosis Sister     Lymphoma Maternal Aunt     Arthritis Mother     Chronic back pain Brother     Colon cancer Neg Hx     Diabetes Neg Hx     Miscarriages / Stillbirths Neg Hx     Stroke Neg Hx     Melanoma Neg Hx        SOCIAL HISTORY:   Social History     Socioeconomic History    Marital status:    Tobacco Use    Smoking status: Never    Smokeless tobacco: Never   Substance and Sexual Activity    Alcohol use: Yes     Comment: 2-3 monthly    Drug use: Yes     Types: Marijuana    Sexual activity: Yes     Partners: Male   Other Topics Concern    Are you pregnant or think you may be? No    Breast-feeding No   Social History Narrative    Doing water aerobic three times weekly. Also walks.        MEDICATIONS:     Current Outpatient Medications:     acetaminophen (TYLENOL) 500 MG tablet, Take 500 mg by mouth every evening., Disp: , Rfl:     cephALEXin (KEFLEX) 500 MG capsule, Take 1 capsule (500 mg total) by mouth 3 (three) times daily. for 7 days, Disp: 21 capsule, Rfl: 0    cetirizine (ZYRTEC) 10 MG tablet, Take 10 mg by mouth once daily., Disp: , Rfl:     cholecalciferol, vitamin D3, (VITAMIN D3) 100 mcg (4,000 unit) Cap, Take 1 capsule by mouth., Disp: , Rfl:     estradioL-levonorgestreL (CLIMARA PRO) 0.045-0.015 mg/24 hr, Place 1 patch onto the skin once a week., Disp: 4 patch, Rfl: 12    levothyroxine (SYNTHROID) 88 MCG tablet, Take 1 tablet (88 mcg total) by mouth before breakfast., Disp: 90 tablet, Rfl: 3    traZODone (DESYREL) 50 MG tablet, TAKE 1 TABLET(50 MG) BY MOUTH NIGHTLY AS NEEDED FOR INSOMNIA, Disp: 90 tablet, Rfl: 3    meloxicam (MOBIC) 15 MG tablet, Take 1 tablet (15 mg total) by mouth once daily., Disp: 90 tablet, Rfl: 1    SUTAB 1.479-0.188- 0.225 gram tablet, SMARTSI Tablet(s) By Mouth As Directed, Disp: , Rfl:     ALLERGIES:   Review of patient's  "allergies indicates:   Allergen Reactions    Iodinated contrast media         PHYSICAL EXAMINATION:  /72 (BP Location: Right arm, Patient Position: Sitting, BP Method: Large (Automatic))   Pulse 67   Ht 5' 11" (1.803 m)   Wt 90.7 kg (200 lb)   LMP 01/17/2020   BMI 27.89 kg/m²   Vitals signs and nursing note have been reviewed.  General: In no acute distress, well developed, well nourished, no diaphoresis  Eyes: EOM full and smooth, no eye redness or discharge  HENT: normocephalic and atraumatic, neck supple, trachea midline, no nasal discharge, no external ear redness or discharge  Cardiovascular: no LE edema  Lungs: respirations non-labored, no conversational dyspnea   Abd: non-distended, no rigidity  MSK: no amputation or deformity, no swelling of extremities  Neuro: AAOx3, CN2-12 grossly intact  Skin: No rashes, warm and dry  Psychiatric: cooperative, pleasant, mood and affect appropriate for age    HIP: RIGHT  The affected hip is compared to the contralateral hip.    Observation:    There is no edema, erythema, or ecchymosis in the lumbosacral region.   No obvious pelvic obliquity while standing.    No thoracolumbar scoliosis observed.    No midline skin abnormalities.  No atrophy noted in the lower limbs.  Right antalgic gait is noted.     ROM:   Passive hip flexion to 120° on left and 120° on right.    Passive hip internal rotation to 45° on left and 45° on right.    Passive hip external rotation to 45° on left and 45° on right.    Passive hip abduction to 45° on left and 45° on right.    Pain present with internal and external rotation    Tenderness To Palpation:  No tenderness at the ASIS, AIIS, PSIS, PIIS, iliac crest, pubic bones, ischial tuberosity.  + tenderness at the greater trochanter  + tenderness over the glute attachments on the greater trochanter.  No tenderness over hip flexor musculature.    Strength Testing:  Hip flexion - 5/5 on left and 5/5 on right  Hip extension - 5/5 on left and " 5/5 on right  Hip adduction - 5/5 on left and 5/5 on right  Hip abduction - 5/5 on left and 5/5 on right  Knee flexion - 5/5 on left and 5/5 on right  Knee extension - 5/5 on left and 5/5 on right    Special Tests:  Log roll - negative  ROXANNE - positive pain at lateral hip  FADIR - negative  Scour test - negative  No pain with posterior hip capsule compression    ASIS compression test - negative  SI drawer test - negative   Thigh thrust test - negative     Radha test - positive  Matthew compression test - positive    Posture:  Upright  Gait: Right antalgic    TART (Tissue texture abnormality, Asymmetry,  Restriction of motion and/or Tenderness) changes:    Head:     Cervical Spine  Thoracic Spine  Lumbar Spine   C1  T1  L1 NSRRL   C2  T2  L2 Neutral   C3  T3  L3 Neutral   C4  T4  L4 FRSR   C5  T5 Neutral L5 FRSR   C6  T6 Neutral     C7  T7 Neutral       T8 Neutral       T9 Neutral       T10 NSRRL       T11 NSRRL       T12 NSRRL       Ribs:    Upper Extremity:    Abdomen:    Pelvis:  Innominate:Right anterior rotation  Pubic bone:Right inferior pubic shear    Sacrum:Left on Right sacral torsion    Lower Extremity:  Myofascial restriction right upper leg    Key   F= Flexed   E = Extended   R = Rotated   N = Neutral   S = Sidebent   TTA = tissue texture abnormality   L/R/B = left/right/bilateral (last letter)       Neurovascular Exam:  Normal gait without Trendelenburg.  2+ femoral, DP, and PT pulses BL.  No skin changes, no abnormal hair distribution.  Sensation intact to light touch throughout the obturator and medial/lateral/posterior femoral cutaneous nerves.  Capillary refill intact to <2 seconds in all lower limb digits.      IMAGIN. X-ray obtained 2022 due to bilateral low back pain  2. X-ray images were reviewed personally by me and then directly with patient.  3. FINDINGS: X-ray images obtained demonstrate mild curvature of the lumbar spine.  The vertebral bodies are normal in height.  Minimal  retrolisthesis present at L3-4.  Remaining vertebral bodies are normally aligned.  There is multilevel disc space narrowing and mild osteophytic spurring along vertebral endplates.  There are facet degenerative changes.  There is no significant instability with flexion or extension.  4. IMPRESSION:  Multilevel lumbar spondylosis and DDD with minimal retrolisthesis present at L3-4. There is no significant instability with flexion or extension.    1. MRI obtained 05/19/2023 due to right hip pain   2. MRI images were reviewed personally by me and then directly with patient.  3. FINDINGS: MRI images obtained demonstrate bilateral acetabular fraying, right gluteus minimus and medius tendinosis with greater trochanteric bursitis, bilateral hamstring tendinosis with small interstitial tears, degenerative changes of the lower lumbar spine, leiomyomatous uterus with heterogenous appearance of the endometrium  4. IMPRESSION: As above.       ASSESSMENT:      ICD-10-CM ICD-9-CM   1. Pain of right hip  M25.551 719.45   2. Greater trochanteric pain syndrome of right lower extremity  M25.551 719.45   3. Somatic dysfunction of lumbar region  M99.03 739.3   4. Somatic dysfunction of pelvis region  M99.05 739.5   5. Somatic dysfunction of lower extremity  M99.06 739.6   6. Somatic dysfunction of sacral region  M99.04 739.4   7. Somatic dysfunction of thoracic region  M99.02 739.2       PLAN:  1-2.  Lateral right hip pain/greater trochanteric pain syndrome - improved, now worsening    - Elise presents for follow-up on her right lateral hip pain. She has responded well to OMT and HEP with IT foam roller in the past. She had right hip GTB CSI at visit 11/03/2022 and admits to appreciating improvement in her pain following until February 2023. She has remained compliant with her HEP.      - MRI obtained 05/19/2023 and images were personally reviewed with the patient. See above for further detail.  She states that uterine fibroids are  known and she has already been advised by her gynecologist that a hysterectomy should be considered and she will likely proceed with this procedure in fall 2023.    - She admits to feeling 90% improved since her last visit which she attributes to Mobic 15 mg and is pleased with this.     - Based on her description/body language of pain and somatic dysfunction identified on exam, I discussed osteopathic manipulation as a treatment option today.  She consents to evaluation and treatment.  See below.    - Continue with Mobic 15 mg as needed. Refilled today.     - Continue with previously prescribed HEP for core and pelvic strengthening and stabilizing prescribed at initial visit. Added prone press up, bird dogs, cat/cow today. Handouts reissued and explained today.       3-7. Somatic dysfunction lumbar, pelvis, sacral, thoracic, lower extremity regions -     - OMT 5-6 regions. Oral consent obtained. Reviewed benefits and potential side effects. OMT indicated today due to signs and symptoms as well as local and remote somatic dysfunction findings and their related neurokinetic, lymphatic, fascial and/or arteriovenous body connections. OMT techniques used: Soft Tissue, Myofascial Release, and Muscle Energy. Treatment was tolerated well. Improvement noted in segmental mobility post-treatment in dysfunctional regions. There were no adverse events and no complications immediately following treatment. Advised plenty of water to help alleviate soreness.      Future planning includes - add physical therapy, OMT if helpful and if indicated    All questions were answered to the best of my ability and all concerns were addressed at this time.    Follow up as needed.       This note is dictated using the M*Modal Fluency Direct word recognition program. There are word recognition mistakes that are occasionally missed on review.      Total time spent face-to face with patient counseling or coordinating care including prognosis,  differential diagnosis, risks and benefits of treatment, instructions, compliance risk reductions as well as non-face-to-face time personally spent reviewing medial record, medical documentation, and coordination of care.     EST MINUTES X   [id number] 10-19    77201 20-29    [id number] 30-39 X   [id number] 40-54    NEW     [id number] 15-29    [id number] 30-44    [id number] 45-59    [id number] 60-74    PHONE      5-10    [id number] 11-20    [id number] 21-30

## 2023-05-22 ENCOUNTER — PATIENT MESSAGE (OUTPATIENT)
Dept: OBSTETRICS AND GYNECOLOGY | Facility: CLINIC | Age: 68
End: 2023-05-22
Payer: MEDICARE

## 2023-05-22 ENCOUNTER — OFFICE VISIT (OUTPATIENT)
Dept: SPORTS MEDICINE | Facility: CLINIC | Age: 68
End: 2023-05-22
Payer: MEDICARE

## 2023-05-22 VITALS
DIASTOLIC BLOOD PRESSURE: 72 MMHG | HEIGHT: 71 IN | BODY MASS INDEX: 28 KG/M2 | WEIGHT: 200 LBS | HEART RATE: 67 BPM | SYSTOLIC BLOOD PRESSURE: 119 MMHG

## 2023-05-22 DIAGNOSIS — M99.03 SOMATIC DYSFUNCTION OF LUMBAR REGION: ICD-10-CM

## 2023-05-22 DIAGNOSIS — M25.551 PAIN OF RIGHT HIP: Primary | ICD-10-CM

## 2023-05-22 DIAGNOSIS — M25.551 GREATER TROCHANTERIC PAIN SYNDROME OF RIGHT LOWER EXTREMITY: ICD-10-CM

## 2023-05-22 DIAGNOSIS — M99.04 SOMATIC DYSFUNCTION OF SACRAL REGION: ICD-10-CM

## 2023-05-22 DIAGNOSIS — M99.06 SOMATIC DYSFUNCTION OF LOWER EXTREMITY: ICD-10-CM

## 2023-05-22 DIAGNOSIS — M99.02 SOMATIC DYSFUNCTION OF THORACIC REGION: ICD-10-CM

## 2023-05-22 DIAGNOSIS — M99.05 SOMATIC DYSFUNCTION OF PELVIS REGION: ICD-10-CM

## 2023-05-22 PROCEDURE — 1160F RVW MEDS BY RX/DR IN RCRD: CPT | Mod: CPTII,S$GLB,, | Performed by: ORTHOPAEDIC SURGERY

## 2023-05-22 PROCEDURE — 3078F DIAST BP <80 MM HG: CPT | Mod: CPTII,S$GLB,, | Performed by: ORTHOPAEDIC SURGERY

## 2023-05-22 PROCEDURE — 3078F PR MOST RECENT DIASTOLIC BLOOD PRESSURE < 80 MM HG: ICD-10-PCS | Mod: CPTII,S$GLB,, | Performed by: ORTHOPAEDIC SURGERY

## 2023-05-22 PROCEDURE — 3008F PR BODY MASS INDEX (BMI) DOCUMENTED: ICD-10-PCS | Mod: CPTII,S$GLB,, | Performed by: ORTHOPAEDIC SURGERY

## 2023-05-22 PROCEDURE — 99999 PR PBB SHADOW E&M-EST. PATIENT-LVL II: ICD-10-PCS | Mod: PBBFAC,,, | Performed by: ORTHOPAEDIC SURGERY

## 2023-05-22 PROCEDURE — 99999 PR PBB SHADOW E&M-EST. PATIENT-LVL II: CPT | Mod: PBBFAC,,, | Performed by: ORTHOPAEDIC SURGERY

## 2023-05-22 PROCEDURE — 3288F FALL RISK ASSESSMENT DOCD: CPT | Mod: CPTII,S$GLB,, | Performed by: ORTHOPAEDIC SURGERY

## 2023-05-22 PROCEDURE — 1126F PR PAIN SEVERITY QUANTIFIED, NO PAIN PRESENT: ICD-10-PCS | Mod: CPTII,S$GLB,, | Performed by: ORTHOPAEDIC SURGERY

## 2023-05-22 PROCEDURE — 98927 OSTEOPATH MANJ 5-6 REGIONS: CPT | Mod: S$GLB,,, | Performed by: ORTHOPAEDIC SURGERY

## 2023-05-22 PROCEDURE — 99214 OFFICE O/P EST MOD 30 MIN: CPT | Mod: 25,S$GLB,, | Performed by: ORTHOPAEDIC SURGERY

## 2023-05-22 PROCEDURE — 3074F SYST BP LT 130 MM HG: CPT | Mod: CPTII,S$GLB,, | Performed by: ORTHOPAEDIC SURGERY

## 2023-05-22 PROCEDURE — 3288F PR FALLS RISK ASSESSMENT DOCUMENTED: ICD-10-PCS | Mod: CPTII,S$GLB,, | Performed by: ORTHOPAEDIC SURGERY

## 2023-05-22 PROCEDURE — 1159F MED LIST DOCD IN RCRD: CPT | Mod: CPTII,S$GLB,, | Performed by: ORTHOPAEDIC SURGERY

## 2023-05-22 PROCEDURE — 3008F BODY MASS INDEX DOCD: CPT | Mod: CPTII,S$GLB,, | Performed by: ORTHOPAEDIC SURGERY

## 2023-05-22 PROCEDURE — 1159F PR MEDICATION LIST DOCUMENTED IN MEDICAL RECORD: ICD-10-PCS | Mod: CPTII,S$GLB,, | Performed by: ORTHOPAEDIC SURGERY

## 2023-05-22 PROCEDURE — 1126F AMNT PAIN NOTED NONE PRSNT: CPT | Mod: CPTII,S$GLB,, | Performed by: ORTHOPAEDIC SURGERY

## 2023-05-22 PROCEDURE — 98927 PR OSTEOPATHIC MANIP,5-6 BODY REGN: ICD-10-PCS | Mod: S$GLB,,, | Performed by: ORTHOPAEDIC SURGERY

## 2023-05-22 PROCEDURE — 99214 PR OFFICE/OUTPT VISIT, EST, LEVL IV, 30-39 MIN: ICD-10-PCS | Mod: 25,S$GLB,, | Performed by: ORTHOPAEDIC SURGERY

## 2023-05-22 PROCEDURE — 1101F PR PT FALLS ASSESS DOC 0-1 FALLS W/OUT INJ PAST YR: ICD-10-PCS | Mod: CPTII,S$GLB,, | Performed by: ORTHOPAEDIC SURGERY

## 2023-05-22 PROCEDURE — 3074F PR MOST RECENT SYSTOLIC BLOOD PRESSURE < 130 MM HG: ICD-10-PCS | Mod: CPTII,S$GLB,, | Performed by: ORTHOPAEDIC SURGERY

## 2023-05-22 PROCEDURE — 1160F PR REVIEW ALL MEDS BY PRESCRIBER/CLIN PHARMACIST DOCUMENTED: ICD-10-PCS | Mod: CPTII,S$GLB,, | Performed by: ORTHOPAEDIC SURGERY

## 2023-05-22 PROCEDURE — 1101F PT FALLS ASSESS-DOCD LE1/YR: CPT | Mod: CPTII,S$GLB,, | Performed by: ORTHOPAEDIC SURGERY

## 2023-05-22 RX ORDER — MELOXICAM 15 MG/1
15 TABLET ORAL DAILY
Qty: 90 TABLET | Refills: 1 | Status: ON HOLD | OUTPATIENT
Start: 2023-05-22 | End: 2023-09-18 | Stop reason: HOSPADM

## 2023-05-23 DIAGNOSIS — N95.0 POSTMENOPAUSAL BLEEDING: Primary | ICD-10-CM

## 2023-05-24 ENCOUNTER — OFFICE VISIT (OUTPATIENT)
Dept: DERMATOLOGY | Facility: CLINIC | Age: 68
End: 2023-05-24
Payer: MEDICARE

## 2023-05-24 DIAGNOSIS — Z09 POSTOP CHECK: Primary | ICD-10-CM

## 2023-05-24 PROCEDURE — 1159F MED LIST DOCD IN RCRD: CPT | Mod: CPTII,S$GLB,, | Performed by: DERMATOLOGY

## 2023-05-24 PROCEDURE — 1126F AMNT PAIN NOTED NONE PRSNT: CPT | Mod: CPTII,S$GLB,, | Performed by: DERMATOLOGY

## 2023-05-24 PROCEDURE — 1101F PT FALLS ASSESS-DOCD LE1/YR: CPT | Mod: CPTII,S$GLB,, | Performed by: DERMATOLOGY

## 2023-05-24 PROCEDURE — 3288F FALL RISK ASSESSMENT DOCD: CPT | Mod: CPTII,S$GLB,, | Performed by: DERMATOLOGY

## 2023-05-24 PROCEDURE — 3288F PR FALLS RISK ASSESSMENT DOCUMENTED: ICD-10-PCS | Mod: CPTII,S$GLB,, | Performed by: DERMATOLOGY

## 2023-05-24 PROCEDURE — 99024 PR POST-OP FOLLOW-UP VISIT: ICD-10-PCS | Mod: S$GLB,,, | Performed by: DERMATOLOGY

## 2023-05-24 PROCEDURE — 99024 POSTOP FOLLOW-UP VISIT: CPT | Mod: S$GLB,,, | Performed by: DERMATOLOGY

## 2023-05-24 PROCEDURE — 1126F PR PAIN SEVERITY QUANTIFIED, NO PAIN PRESENT: ICD-10-PCS | Mod: CPTII,S$GLB,, | Performed by: DERMATOLOGY

## 2023-05-24 PROCEDURE — 1159F PR MEDICATION LIST DOCUMENTED IN MEDICAL RECORD: ICD-10-PCS | Mod: CPTII,S$GLB,, | Performed by: DERMATOLOGY

## 2023-05-24 PROCEDURE — 1101F PR PT FALLS ASSESS DOC 0-1 FALLS W/OUT INJ PAST YR: ICD-10-PCS | Mod: CPTII,S$GLB,, | Performed by: DERMATOLOGY

## 2023-05-24 PROCEDURE — 99999 PR PBB SHADOW E&M-EST. PATIENT-LVL II: ICD-10-PCS | Mod: PBBFAC,,, | Performed by: DERMATOLOGY

## 2023-05-24 PROCEDURE — 99999 PR PBB SHADOW E&M-EST. PATIENT-LVL II: CPT | Mod: PBBFAC,,, | Performed by: DERMATOLOGY

## 2023-05-24 NOTE — PROGRESS NOTES
67 y.o. female patient is here for suture removal following Mohs' surgery.    Patient reports no problems.    WOUND PE:  The midline forehead and right nasal tip sutures intact. Wound healing well. Good skin edges. No signs or symptoms of infection.    IMPRESSION:  Healing operative site from Mohs' surgery SCCIS midline forehead s/p Mohs with CLC, and BCC right nasal tip s/p Mohs with CLC, postop day #7.    PLAN:  Sutures removed today by  Gina Montero RN . Steri-strips applied.  Continue wound care.  Keep moist with Aquaphor.    RTC:  In 1 month for next Mohs

## 2023-05-29 DIAGNOSIS — C44.311 BASAL CELL CARCINOMA OF RIGHT ALA NASI: Primary | ICD-10-CM

## 2023-05-30 ENCOUNTER — TELEPHONE (OUTPATIENT)
Dept: OBSTETRICS AND GYNECOLOGY | Facility: CLINIC | Age: 68
End: 2023-05-30
Payer: MEDICARE

## 2023-05-30 NOTE — TELEPHONE ENCOUNTER
Spoke with pt and gave her appt for pre admit and to sign consents  with Dr Wynn will be on 9/11@ 9:30 & 10:45

## 2023-06-12 ENCOUNTER — PROCEDURE VISIT (OUTPATIENT)
Dept: DERMATOLOGY | Facility: CLINIC | Age: 68
End: 2023-06-12
Payer: MEDICARE

## 2023-06-12 VITALS
DIASTOLIC BLOOD PRESSURE: 83 MMHG | HEART RATE: 55 BPM | HEIGHT: 71 IN | SYSTOLIC BLOOD PRESSURE: 144 MMHG | WEIGHT: 200 LBS | BODY MASS INDEX: 28 KG/M2

## 2023-06-12 DIAGNOSIS — C44.311 BASAL CELL CARCINOMA OF RIGHT ALA NASI: ICD-10-CM

## 2023-06-12 PROCEDURE — 17312: ICD-10-PCS | Mod: S$GLB,,, | Performed by: DERMATOLOGY

## 2023-06-12 PROCEDURE — 17311 MOHS 1 STAGE H/N/HF/G: CPT | Mod: S$GLB,,, | Performed by: DERMATOLOGY

## 2023-06-12 PROCEDURE — 17311: ICD-10-PCS | Mod: S$GLB,,, | Performed by: DERMATOLOGY

## 2023-06-12 PROCEDURE — 99499 UNLISTED E&M SERVICE: CPT | Mod: S$GLB,,, | Performed by: DERMATOLOGY

## 2023-06-12 PROCEDURE — 99499 NO LOS: ICD-10-PCS | Mod: S$GLB,,, | Performed by: DERMATOLOGY

## 2023-06-12 PROCEDURE — 17312 MOHS ADDL STAGE: CPT | Mod: S$GLB,,, | Performed by: DERMATOLOGY

## 2023-06-12 NOTE — PROGRESS NOTES
PROCEDURE: Mohs' Micrographic Surgery    INDICATION: Location in mask areas of face including central face, nose, eyelids, eyebrows, lips, chin, preauricular, temple, and ear. Biopsy-proven skin cancer of cosmetically and functionally important areas, including head, neck, genital, hand, foot, or areas known for having difficulty in healing, such as the lower anterior legs. Tumor with ill-defined borders. In patient with proven history of difficult or aggressive skin cancer.    REFERRING PROVIDER: Mary Jo Sotomayor M.D.    CASE NUMBER:     ANESTHETIC: 2.5 cc 0.5% Bupivacaine with Epi 1:200,000 mixed 1:1 with plain 1% Lidocaine    SURGICAL PREP: Hibiclens    SURGEON: Marika Panda MD    ASSISTANTS: Sherrell Crews PA-C, Fior Gusman, Surg Tech, and Ryann Birmingham, Surg Tech    PREOPERATIVE DIAGNOSIS: basal cell carcinoma- nodular    POSTOPERATIVE DIAGNOSIS: basal cell carcinoma- nodular    PATHOLOGIC DIAGNOSIS: basal cell carcinoma- nodular    HISTOLOGY OF SPECIMENS IN FIRST STAGE:   Debulking tumor confirms nodular basal cell carcinoma.  Tumor Type: Tumor seen. Nodular basal cell carcinoma: Nodular tumor in dermis composed of basaloid cells exhibiting peripheral palisading and retraction artifact.   Depth of Invasion: epidermis, dermis, and subcutaneous tissue  Perineural Invasion: No    HISTOLOGY OF SPECIMENS IN SUBSEQUENT STAGES:  Tumor Type:  No tumor seen.    STAGES OF MOHS' SURGERY PERFORMED: 2    TUMOR-FREE PLANE ACHIEVED: Yes    HEMOSTASIS: electrocoagulation     SPECIMENS: 3 (2 in stage A and 1 in stage B)    LOCATION: right alar groove. Location verified with Dr. Sotomayor's clinical photograph. Patient also verified location with hand held mirror.    INITIAL LESION SIZE: 0.4 x 0.5 cm    FINAL DEFECT SIZE: 0.7 x 0.9 cm    WOUND REPAIR/DISPOSITION: When the tumor was completely removed, repair options were discussed with the patient, and it was decided to let the wound heal by second intention. The patient  "tolerated the procedure well and will consider delayed reconstruction or repair if necessary.    The area was cleaned and dressed appropriately, and the patient was given wound care instructions.  Patient will be out of town this summer and will send us a photo in 1 month to ensure healing.     Vitals:    06/12/23 0753 06/12/23 1040   BP: 117/71 (!) 144/83   BP Location: Right arm    Patient Position: Sitting    BP Method: Small (Automatic)    Pulse: 71 (!) 55   Weight: 90.7 kg (200 lb)    Height: 5' 11" (1.803 m)          "

## 2023-06-19 ENCOUNTER — TELEPHONE (OUTPATIENT)
Dept: DERMATOLOGY | Facility: CLINIC | Age: 68
End: 2023-06-19
Payer: MEDICARE

## 2023-06-19 NOTE — TELEPHONE ENCOUNTER
Returned patient's call after viewing photo of site (right alar groove). Informed patient to remove scabbing, keep the wound moist, and cover it with telfa, then regular gauze, and a bandage on top. Informed patient that she does not need to stuff the wound with any gauze, but to ensure that the wound stays moist and does not scab. Patient verbalized understanding.

## 2023-06-19 NOTE — TELEPHONE ENCOUNTER
Returned call to patient regarding wound from Mohs procedure site BCC right nasal alar groove on 6/12. Patient states the site is bleeding a little when she rubs a q-tip over the site, oozing a little, and sore. Patient asked if she needed to continue with aquaphor, advised her to continue using aquaphor, but not too much as it will become macerated if she uses too much. Patient seemed concerned, told the patient that she can send in a photo for us to take a look at. Patient agreed. Awaiting email.

## 2023-06-19 NOTE — TELEPHONE ENCOUNTER
----- Message from Yuly Fernandez sent at 6/19/2023  8:44 AM CDT -----  Regarding: Post Op  Contact: Pt [phone]  Pt stated she had a surgery last Monday and states she is concerned about her wound states it's has stated bleeding and states it hurts please call

## 2023-08-24 DIAGNOSIS — Z78.0 MENOPAUSE: ICD-10-CM

## 2023-08-24 DIAGNOSIS — N95.1 MENOPAUSAL HOT FLUSHES: ICD-10-CM

## 2023-08-24 RX ORDER — ESTRADIOL AND LEVONORGESTREL .045; .015 MG/D; MG/D
1 PATCH TRANSDERMAL WEEKLY
Qty: 4 PATCH | Refills: 12 | Status: SHIPPED | OUTPATIENT
Start: 2023-08-24 | End: 2023-08-25 | Stop reason: SDUPTHER

## 2023-08-24 NOTE — PROGRESS NOTES
Subjective:     Sara David    The patient location is: home  The chief complaint leading to consultation is: F/u lumbar spine MRI    Visit type: audiovisual    Face to Face time with patient: 20  25 minutes of total time spent on the encounter, which includes face to face time and non-face to face time preparing to see the patient (eg, review of tests), Obtaining and/or reviewing separately obtained history, Documenting clinical information in the electronic or other health record, Independently interpreting results (not separately reported) and communicating results to the patient/family/caregiver, or Care coordination (not separately reported).     Each patient to whom he or she provides medical services by telemedicine is:  (1) informed of the relationship between the physician and patient and the respective role of any other health care provider with respect to management of the patient; and (2) notified that he or she may decline to receive medical services by telemedicine and may withdraw from such care at any time.    Notes:     Elise is a 66 y.o. female coming in today for bilateral hip, low back pain. Since last visit the pain has remained unchanged. The pain is better with Advil, rest, stretching and worse with sitting, walking, activity. Pt. describes the pain as an achy pain that does radiate occasionally radiate into her left lateral leg, but doesn't not go past the knee. There has not been any new a fall/injury/ or traumas since last visit.  Pt. denies any new musculoskeletal complaints at this time.  Of note, patient is leaving in a few weeks to go to [place] for 2 months to take care of her parents.    Office note from 5/9/22 reviewed    Joint instability? no  Mechanical locking/clicking? no  Affecting ADL's? yes  Affecting sleep? yes    Review of Systems   Constitutional: Negative for chills and fever.   Musculoskeletal: Positive for back pain, joint pain and myalgias. Negative for  falls and neck pain.   Neurological: Negative for dizziness, tingling, focal weakness, weakness and headaches.       PAST MEDICAL HISTORY:   Past Medical History:   Diagnosis Date    Colon polyp 2008    Disorder of kidney and ureter     Endometriosis     History of melanoma in situ 4/17/2018    Left arm, s/p excision by Alexandre Gomez MD    Hypothyroidism     dx in her 40s, enlarged thyroid, did have FNA of thyroid nodule which was benign    Melanoma 1990, 2018 1990s - on back; 2018 - L upper arm    Miscarriage      PAST SURGICAL HISTORY:   Past Surgical History:   Procedure Laterality Date    BACK SURGERY      DILATION AND CURETTAGE OF UTERUS      History of polyps    EXPLORATORY LAPAROTOMY WITH UTERINE MYOMECTOMY      FNA thyroid      in her 40s    MALIGNANT SKIN LESION EXCISION      1990s, 2018    SALPINGOOPHORECTOMY      WISDOM TOOTH EXTRACTION         MEDICATIONS:     Current Outpatient Medications:     acetaminophen (TYLENOL) 500 MG tablet, Take 500 mg by mouth every evening., Disp: , Rfl:     celecoxib (CELEBREX) 200 MG capsule, Take 1 capsule (200 mg total) by mouth once daily. With food for 60 doses, Disp: 30 capsule, Rfl: 1    cetirizine (ZYRTEC) 10 MG tablet, Take 10 mg by mouth once daily., Disp: , Rfl:     cholecalciferol, vitamin D3, (VITAMIN D3) 100 mcg (4,000 unit) Cap, Take 1 capsule by mouth., Disp: , Rfl:     estradioL-levonorgestreL (CLIMARA PRO) 0.045-0.015 mg/24 hr, Place 1 patch onto the skin once a week., Disp: 4 patch, Rfl: 12    levothyroxine (SYNTHROID) 88 MCG tablet, Take 1 tablet (88 mcg total) by mouth before breakfast., Disp: 90 tablet, Rfl: 3    traZODone (DESYREL) 50 MG tablet, TAKE 1 TABLET(50 MG) BY MOUTH NIGHTLY AS NEEDED FOR INSOMNIA, Disp: 90 tablet, Rfl: 3  ALLERGIES:   Review of patient's allergies indicates:   Allergen Reactions    Iodinated contrast media          Objective:     VITAL SIGNS: LMP 01/17/2020    General    Vitals  independent reviewed.  Constitutional: She is oriented to person, place, and time. She appears well-developed and well-nourished.   Neurological: She is alert and oriented to person, place, and time.   Psychiatric: She has a normal mood and affect. Her behavior is normal.               MUSCULOSKELETAL EXAM    + subjective left lower back pain  Negative self-performed seated straight leg raise bilaterally    IMAGIN. MRI ordered due to low back pain taken 22.   2. MRI images were reviewed personally by me and then directly with patient.  3. FINDINGS:   Alignment: Grade 1 retrolisthesis L3-L4 and L4-L5.     Vertebrae: Vertebral body heights are maintained without evidence for fracture or marrow infiltrative process.  Degenerative change and Schmorl's nodes identified at inferior L3 and superior L4 endplates.  Small vertebral body hemangioma in the L1 vertebral body.     No evidence for discitis/osteomyelitis.     Discs: Multilevel intervertebral disc space height loss most prominent at L3-L4 and L4-L5.     Cord: Distal cord maintains normal signal intensity and contour.  Conus terminates at L2 level.     Degenerative findings:     T12-L1: No spinal canal stenosis or neural foraminal narrowing.     L1-L2: Mild circumferential disc bulge with no spinal canal stenosis or neural foraminal narrowing.     L2-L3: Circumferential disc bulge and mild bilateral facet joint hypertrophy with no spinal canal stenosis or neural foraminal narrowing.     L3-L4: Circumferential disc bulge and moderate bilateral facet joint hypertrophy left greater than right, which contribute to mild spinal canal stenosis as well as mild left neural foraminal narrowing.     L4-L5: Circumferential disc bulge with annular fissure, as well as moderate bilateral facet joint hypertrophy with mild ligamentum flavum buckling, which contribute to mild spinal canal stenosis as well as moderate right neural foraminal narrowing.     L5-S1: Circumferential disc  bulge with small superimposed central disc protrusion and annular fissure, as well as moderate bilateral facet joint hypertrophy.  No spinal canal stenosis or neural foraminal narrowing.     Mild paraspinal muscle atrophy.     Sacroiliac joints are unremarkable.     Bilateral peripelvic cysts.  4. IMPRESSION: Multilevel degenerative changes of the lumbar spine detailed above.  Moderate right neural foraminal narrowing noted L4-L5.    Assessment:      Encounter Diagnoses   Name Primary?    Lumbar spondylosis Yes    DDD (degenerative disc disease), lumbar     Chronic bilateral low back pain without sciatica           Plan:   1. Persistent chronic low back pain with underlying multilevel lumbar DDD and spondylosis noted on today's on previus spine x-rays and recent Lumbar spine MRI .  No significant lumbar radicular symptoms appreciated on prior physical exam.  -  Prescription given for Celebrex 200 mg p.o. q.day x 14 days then prn for pain control. Pt. Advised to avoid all other NSAIDS while on this medication.   - Referral to outpatient PT ([facility] location) for increased core and lumbar spine strengthening with home exercise program, neuromuscular retraining, and postural retraining   - Recommend continuing Ice up to 20 minutes at a time  - discussed option of referral to Pain Management for further evaluation and injection treatments of the lumbar spine as next step if symptoms persist following formal PT or if symptoms worsen with current management  -  X-ray images of left hip taken 6/8/21 (AP pelvis and frogleg lateral  left views) showed mild DJD changes bilaterally. Images were personally reviewed with patient.  -  X-ray images of lumbar spine taken today (AP, lateral, bilateral obliques, L5-S1 joint, flexion and extension views) showed multilevel lumbar spondylosis and DDD with minimal retrolisthesis present at L3-4. There is no significant instability with flexion or extension.. Images were personally  reviewed with patient.  - MRI  images of lumbar spine taken 6/13/22 showed Multilevel degenerative changes of the lumbar spine.  Moderate right neural foraminal narrowing noted L4-L5. Images were personally reviewed with patient.     2. Follow-up upon completion of PT if pain persist or deteriorates     3. Patient agreeable to today's plan and all questions were answered     This note is dictated using the M*Modal Fluency Direct word recognition program. There are word recognition mistakes that are occasionally missed on review.

## 2023-08-25 ENCOUNTER — OFFICE VISIT (OUTPATIENT)
Dept: OBSTETRICS AND GYNECOLOGY | Facility: CLINIC | Age: 68
End: 2023-08-25
Payer: MEDICARE

## 2023-08-25 VITALS
DIASTOLIC BLOOD PRESSURE: 77 MMHG | BODY MASS INDEX: 27.93 KG/M2 | WEIGHT: 199.5 LBS | HEIGHT: 71 IN | SYSTOLIC BLOOD PRESSURE: 104 MMHG

## 2023-08-25 DIAGNOSIS — N95.0 POSTMENOPAUSAL BLEEDING: Primary | ICD-10-CM

## 2023-08-25 DIAGNOSIS — N95.1 MENOPAUSAL HOT FLUSHES: ICD-10-CM

## 2023-08-25 DIAGNOSIS — Z78.0 MENOPAUSE: ICD-10-CM

## 2023-08-25 DIAGNOSIS — N95.0 PMB (POSTMENOPAUSAL BLEEDING): ICD-10-CM

## 2023-08-25 PROCEDURE — 99999 PR PBB SHADOW E&M-EST. PATIENT-LVL III: CPT | Mod: PBBFAC,,, | Performed by: OBSTETRICS & GYNECOLOGY

## 2023-08-25 PROCEDURE — 99499 NO LOS: ICD-10-PCS | Mod: S$GLB,,, | Performed by: OBSTETRICS & GYNECOLOGY

## 2023-08-25 PROCEDURE — 99999 PR PBB SHADOW E&M-EST. PATIENT-LVL III: ICD-10-PCS | Mod: PBBFAC,,, | Performed by: OBSTETRICS & GYNECOLOGY

## 2023-08-25 PROCEDURE — 99499 UNLISTED E&M SERVICE: CPT | Mod: S$GLB,,, | Performed by: OBSTETRICS & GYNECOLOGY

## 2023-08-25 RX ORDER — ESTRADIOL AND LEVONORGESTREL .045; .015 MG/D; MG/D
1 PATCH TRANSDERMAL WEEKLY
Qty: 4 PATCH | Refills: 12 | Status: ON HOLD | OUTPATIENT
Start: 2023-08-25 | End: 2023-09-18 | Stop reason: HOSPADM

## 2023-08-25 NOTE — PROGRESS NOTES
History & Physical  Gynecology      SUBJECTIVE:     Chief Complaint: Pre-op Exam       History of Present Illness:  69 y/o  presents for preop exam for TLH/USO for recurrent PMB. Denies PMB since last visit.     PELVIC US 10/2022:  FINDINGS:  Uterus:     Size: 7.4 cm x 4 cm x 6.4 cm     Masses: The following intramural masses are identified     -left anterior body 3 cm x 2.1 cm x 2.7 cm     -posterior body midline 1.4 cm x 1.3 cm x 1.5 cm     -right fundal 1.1 cm x 1.7 cm x 1.5 cm     Endometrium: Thickened in this postmenopausal patient on hormone replacement therapy, measuring 6 mm.     Right ovary:     Size: 2.3 cm x 1.5 cm x 1.6 cm     Appearance: Normal     Vascular flow: Normal.     Left ovary:     The left ovary is surgically absent.  There are no left adnexal abnormalities.     Free Fluid:     None.     Impression:     1. Thickened endometrium which may be related to the patient's hormone replacement therapy.  2. Fibroid uterus not significantly changed in appearance.    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    midline forehead     Past Surgical History:   Procedure Laterality Date    BACK SURGERY      DILATION AND CURETTAGE OF UTERUS      History of polyps    EXPLORATORY LAPAROTOMY WITH UTERINE MYOMECTOMY      FNA thyroid      in her 40s    MALIGNANT SKIN LESION EXCISION      2018    SALPINGOOPHORECTOMY      WISDOM TOOTH  EXTRACTION       OB History          2    Para        Term   0            AB   2    Living             SAB        IAB        Ectopic        Multiple        Live Births                   Family History   Problem Relation Age of Onset    Hypertension Father     Breast cancer Sister     Bone cancer Sister     Parkinsonism Sister     Multiple sclerosis Sister     Lymphoma Maternal Aunt     Arthritis Mother     Chronic back pain Brother     Colon cancer Neg Hx     Diabetes Neg Hx     Miscarriages / Stillbirths Neg Hx     Stroke Neg Hx     Melanoma Neg Hx      Social History     Tobacco Use    Smoking status: Never    Smokeless tobacco: Never   Substance Use Topics    Alcohol use: Yes     Comment: 2-3 monthly    Drug use: Yes     Types: Marijuana       Current Outpatient Medications   Medication Sig    acetaminophen (TYLENOL) 500 MG tablet Take 500 mg by mouth every evening.    cetirizine (ZYRTEC) 10 MG tablet Take 10 mg by mouth once daily.    cholecalciferol, vitamin D3, (VITAMIN D3) 100 mcg (4,000 unit) Cap Take 1 capsule by mouth.    estradioL-levonorgestreL (CLIMARA PRO) 0.045-0.015 mg/24 hr Place 1 patch onto the skin once a week.    levothyroxine (SYNTHROID) 88 MCG tablet Take 1 tablet (88 mcg total) by mouth before breakfast.    meloxicam (MOBIC) 15 MG tablet Take 1 tablet (15 mg total) by mouth once daily.    SUTAB 1.479-0.188- 0.225 gram tablet SMARTSI Tablet(s) By Mouth As Directed    traZODone (DESYREL) 50 MG tablet TAKE 1 TABLET(50 MG) BY MOUTH NIGHTLY AS NEEDED FOR INSOMNIA     No current facility-administered medications for this visit.         Review of Systems:  Review of Systems   Constitutional:  Negative for chills, fatigue, fever and unexpected weight change.   Respiratory:  Negative for cough, shortness of breath and wheezing.    Cardiovascular:  Negative for chest pain and palpitations.   Gastrointestinal:  Negative for abdominal pain, constipation, diarrhea, nausea and vomiting.    Genitourinary:  Positive for menstrual problem. Negative for dyspareunia, hematuria, menorrhagia, pelvic pain, vaginal bleeding, vaginal discharge and vaginal pain.   Musculoskeletal:  Negative for myalgias.   Neurological:  Negative for syncope and headaches.   Hematological:  Negative for adenopathy.   Psychiatric/Behavioral:  The patient is not nervous/anxious.         OBJECTIVE:     Physical Exam:  Physical Exam  Constitutional:       General: She is not in acute distress.     Appearance: She is well-developed.   HENT:      Head: Normocephalic and atraumatic.   Eyes:      Extraocular Movements: Extraocular movements intact.   Cardiovascular:      Rate and Rhythm: Normal rate and regular rhythm.      Heart sounds: Normal heart sounds. No murmur heard.     No gallop.   Pulmonary:      Effort: Pulmonary effort is normal.      Breath sounds: No wheezing, rhonchi or rales.   Abdominal:      General: There is no distension.      Palpations: Abdomen is soft. There is no mass.      Tenderness: There is no abdominal tenderness. There is no guarding or rebound.      Hernia: No hernia is present.   Genitourinary:     Comments: deferred  Musculoskeletal:         General: Normal range of motion.      Cervical back: Normal range of motion.   Neurological:      Mental Status: She is alert and oriented to person, place, and time.   Psychiatric:         Behavior: Behavior normal.         Thought Content: Thought content normal.         Judgment: Judgment normal.           ASSESSMENT:       ICD-10-CM ICD-9-CM    1. Postmenopausal bleeding  N95.0 627.1 Full code      Vital signs      Field to [place]      Notify Physician/Vital Signs Parameters Urine output less than 0.5mL/kg/hr (with indwelling catheter) or 30 mL/hr (without indwelling catheter) or blood glucose greater than 200 mg/dL      Notify physician      Notify Physician - Potential Need of Opioid Reversal      Place sequential compression device      Place in Outpatient       2. Menopause  Z78.0 627.2 estradioL-levonorgestreL (CLIMARA PRO) 0.045-0.015 mg/24 hr      3. Menopausal hot flushes  N95.1 627.2 estradioL-levonorgestreL (CLIMARA PRO) 0.045-0.015 mg/24 hr      4. PMB (postmenopausal bleeding)  N95.0 627.1              Plan:      Sara was seen today for pre-op exam.    Diagnoses and all orders for this visit:    Postmenopausal bleeding  -     Full code; Standing  -     Vital signs; Standing  -     Field to [place]; Standing  -     Notify Physician/Vital Signs Parameters Urine output less than 0.5mL/kg/hr (with indwelling catheter) or 30 mL/hr (without indwelling catheter) or blood glucose greater than 200 mg/dL; Standing  -     Notify physician; Standing  -     Notify Physician - Potential Need of Opioid Reversal; Standing  -     Place sequential compression device; Standing  -     Place in Outpatient; Standing    Menopause  -     estradioL-levonorgestreL (CLIMARA PRO) 0.045-0.015 mg/24 hr; Place 1 patch onto the skin once a week.    Menopausal hot flushes  -     estradioL-levonorgestreL (CLIMARA PRO) 0.045-0.015 mg/24 hr; Place 1 patch onto the skin once a week.    PMB (postmenopausal bleeding)    Other orders  -     IP VTE HIGH RISK PATIENT; Standing  -     Progressive Mobility Protocol (mobilize patient to their highest level of functioning at least twice daily); Standing  -     ceFAZolin (ANCEF) 2 g in dextrose 5 % (D5W) 50 mL IVPB    To OR for TLH/USO for recurrent PMB. Prior workup has been benign with most recent EMBx on 4/26/2023 that was benign.   Consents signed. Risks, benefits, alternatives discussed.  Orders placed.     No orders of the defined types were placed in this encounter.      Follow up for postop.    Counseling time: 15 minutes    Meche Wynn

## 2023-08-25 NOTE — H&P (VIEW-ONLY)
History & Physical  Gynecology      SUBJECTIVE:     Chief Complaint: Pre-op Exam       History of Present Illness:  67 y/o  presents for preop exam for TLH/USO for recurrent PMB. Denies PMB since last visit.     PELVIC US 10/2022:  FINDINGS:  Uterus:     Size: 7.4 cm x 4 cm x 6.4 cm     Masses: The following intramural masses are identified     -left anterior body 3 cm x 2.1 cm x 2.7 cm     -posterior body midline 1.4 cm x 1.3 cm x 1.5 cm     -right fundal 1.1 cm x 1.7 cm x 1.5 cm     Endometrium: Thickened in this postmenopausal patient on hormone replacement therapy, measuring 6 mm.     Right ovary:     Size: 2.3 cm x 1.5 cm x 1.6 cm     Appearance: Normal     Vascular flow: Normal.     Left ovary:     The left ovary is surgically absent.  There are no left adnexal abnormalities.     Free Fluid:     None.     Impression:     1. Thickened endometrium which may be related to the patient's hormone replacement therapy.  2. Fibroid uterus not significantly changed in appearance.    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    midline forehead     Past Surgical History:   Procedure Laterality Date    BACK SURGERY      DILATION AND CURETTAGE OF UTERUS      History of polyps    EXPLORATORY LAPAROTOMY WITH UTERINE MYOMECTOMY      FNA thyroid      in her 40s    MALIGNANT SKIN LESION EXCISION      2018    SALPINGOOPHORECTOMY      WISDOM TOOTH  EXTRACTION       OB History          2    Para        Term   0            AB   2    Living             SAB        IAB        Ectopic        Multiple        Live Births                   Family History   Problem Relation Age of Onset    Hypertension Father     Breast cancer Sister     Bone cancer Sister     Parkinsonism Sister     Multiple sclerosis Sister     Lymphoma Maternal Aunt     Arthritis Mother     Chronic back pain Brother     Colon cancer Neg Hx     Diabetes Neg Hx     Miscarriages / Stillbirths Neg Hx     Stroke Neg Hx     Melanoma Neg Hx      Social History     Tobacco Use    Smoking status: Never    Smokeless tobacco: Never   Substance Use Topics    Alcohol use: Yes     Comment: 2-3 monthly    Drug use: Yes     Types: Marijuana       Current Outpatient Medications   Medication Sig    acetaminophen (TYLENOL) 500 MG tablet Take 500 mg by mouth every evening.    cetirizine (ZYRTEC) 10 MG tablet Take 10 mg by mouth once daily.    cholecalciferol, vitamin D3, (VITAMIN D3) 100 mcg (4,000 unit) Cap Take 1 capsule by mouth.    estradioL-levonorgestreL (CLIMARA PRO) 0.045-0.015 mg/24 hr Place 1 patch onto the skin once a week.    levothyroxine (SYNTHROID) 88 MCG tablet Take 1 tablet (88 mcg total) by mouth before breakfast.    meloxicam (MOBIC) 15 MG tablet Take 1 tablet (15 mg total) by mouth once daily.    SUTAB 1.479-0.188- 0.225 gram tablet SMARTSI Tablet(s) By Mouth As Directed    traZODone (DESYREL) 50 MG tablet TAKE 1 TABLET(50 MG) BY MOUTH NIGHTLY AS NEEDED FOR INSOMNIA     No current facility-administered medications for this visit.         Review of Systems:  Review of Systems   Constitutional:  Negative for chills, fatigue, fever and unexpected weight change.   Respiratory:  Negative for cough, shortness of breath and wheezing.    Cardiovascular:  Negative for chest pain and palpitations.   Gastrointestinal:  Negative for abdominal pain, constipation, diarrhea, nausea and vomiting.    Genitourinary:  Positive for menstrual problem. Negative for dyspareunia, hematuria, menorrhagia, pelvic pain, vaginal bleeding, vaginal discharge and vaginal pain.   Musculoskeletal:  Negative for myalgias.   Neurological:  Negative for syncope and headaches.   Hematological:  Negative for adenopathy.   Psychiatric/Behavioral:  The patient is not nervous/anxious.         OBJECTIVE:     Physical Exam:  Physical Exam  Constitutional:       General: She is not in acute distress.     Appearance: She is well-developed.   HENT:      Head: Normocephalic and atraumatic.   Eyes:      Extraocular Movements: Extraocular movements intact.   Cardiovascular:      Rate and Rhythm: Normal rate and regular rhythm.      Heart sounds: Normal heart sounds. No murmur heard.     No gallop.   Pulmonary:      Effort: Pulmonary effort is normal.      Breath sounds: No wheezing, rhonchi or rales.   Abdominal:      General: There is no distension.      Palpations: Abdomen is soft. There is no mass.      Tenderness: There is no abdominal tenderness. There is no guarding or rebound.      Hernia: No hernia is present.   Genitourinary:     Comments: deferred  Musculoskeletal:         General: Normal range of motion.      Cervical back: Normal range of motion.   Neurological:      Mental Status: She is alert and oriented to person, place, and time.   Psychiatric:         Behavior: Behavior normal.         Thought Content: Thought content normal.         Judgment: Judgment normal.           ASSESSMENT:       ICD-10-CM ICD-9-CM    1. Postmenopausal bleeding  N95.0 627.1 Full code      Vital signs      Field to [place]      Notify Physician/Vital Signs Parameters Urine output less than 0.5mL/kg/hr (with indwelling catheter) or 30 mL/hr (without indwelling catheter) or blood glucose greater than 200 mg/dL      Notify physician      Notify Physician - Potential Need of Opioid Reversal      Place sequential compression device      Place in Outpatient       2. Menopause  Z78.0 627.2 estradioL-levonorgestreL (CLIMARA PRO) 0.045-0.015 mg/24 hr      3. Menopausal hot flushes  N95.1 627.2 estradioL-levonorgestreL (CLIMARA PRO) 0.045-0.015 mg/24 hr      4. PMB (postmenopausal bleeding)  N95.0 627.1              Plan:      Sara was seen today for pre-op exam.    Diagnoses and all orders for this visit:    Postmenopausal bleeding  -     Full code; Standing  -     Vital signs; Standing  -     Field to [place]; Standing  -     Notify Physician/Vital Signs Parameters Urine output less than 0.5mL/kg/hr (with indwelling catheter) or 30 mL/hr (without indwelling catheter) or blood glucose greater than 200 mg/dL; Standing  -     Notify physician; Standing  -     Notify Physician - Potential Need of Opioid Reversal; Standing  -     Place sequential compression device; Standing  -     Place in Outpatient; Standing    Menopause  -     estradioL-levonorgestreL (CLIMARA PRO) 0.045-0.015 mg/24 hr; Place 1 patch onto the skin once a week.    Menopausal hot flushes  -     estradioL-levonorgestreL (CLIMARA PRO) 0.045-0.015 mg/24 hr; Place 1 patch onto the skin once a week.    PMB (postmenopausal bleeding)    Other orders  -     IP VTE HIGH RISK PATIENT; Standing  -     Progressive Mobility Protocol (mobilize patient to their highest level of functioning at least twice daily); Standing  -     ceFAZolin (ANCEF) 2 g in dextrose 5 % (D5W) 50 mL IVPB    To OR for TLH/USO for recurrent PMB. Prior workup has been benign with most recent EMBx on 4/26/2023 that was benign.   Consents signed. Risks, benefits, alternatives discussed.  Orders placed.     No orders of the defined types were placed in this encounter.      Follow up for postop.    Counseling time: 15 minutes    Meche Wynn

## 2023-09-13 ENCOUNTER — HOSPITAL ENCOUNTER (OUTPATIENT)
Dept: PREADMISSION TESTING | Facility: OTHER | Age: 68
Discharge: HOME | End: 2023-09-13
Attending: OBSTETRICS & GYNECOLOGY
Payer: MEDICARE

## 2023-09-13 ENCOUNTER — ANESTHESIA EVENT (OUTPATIENT)
Dept: SURGERY | Facility: OTHER | Age: 68
End: 2023-09-13
Payer: MEDICARE

## 2023-09-13 VITALS
HEIGHT: 71 IN | TEMPERATURE: 99 F | BODY MASS INDEX: 28 KG/M2 | SYSTOLIC BLOOD PRESSURE: 113 MMHG | WEIGHT: 200 LBS | OXYGEN SATURATION: 96 % | HEART RATE: 96 BPM | RESPIRATION RATE: 20 BRPM | DIASTOLIC BLOOD PRESSURE: 71 MMHG

## 2023-09-13 DIAGNOSIS — Z01.818 PREOP TESTING: Primary | ICD-10-CM

## 2023-09-13 LAB
ABO + RH BLD: NORMAL
ANION GAP SERPL CALC-SCNC: 11 MMOL/L (ref 8–16)
BASOPHILS # BLD AUTO: 0.03 K/UL (ref 0–0.2)
BASOPHILS NFR BLD: 0.3 % (ref 0–1.9)
BLD GP AB SCN CELLS X3 SERPL QL: NORMAL
BUN SERPL-MCNC: 17 MG/DL (ref 8–23)
CALCIUM SERPL-MCNC: 9.5 MG/DL (ref 8.7–10.5)
CHLORIDE SERPL-SCNC: 107 MMOL/L (ref 95–110)
CO2 SERPL-SCNC: 22 MMOL/L (ref 23–29)
CREAT SERPL-MCNC: 1.2 MG/DL (ref 0.5–1.4)
DIFFERENTIAL METHOD: ABNORMAL
EOSINOPHIL # BLD AUTO: 0.2 K/UL (ref 0–0.5)
EOSINOPHIL NFR BLD: 2.4 % (ref 0–8)
ERYTHROCYTE [DISTWIDTH] IN BLOOD BY AUTOMATED COUNT: 13.1 % (ref 11.5–14.5)
EST. GFR  (NO RACE VARIABLE): 49 ML/MIN/1.73 M^2
GLUCOSE SERPL-MCNC: 102 MG/DL (ref 70–110)
HCT VFR BLD AUTO: 45.2 % (ref 37–48.5)
HGB BLD-MCNC: 15.1 G/DL (ref 12–16)
IMM GRANULOCYTES # BLD AUTO: 0.03 K/UL (ref 0–0.04)
IMM GRANULOCYTES NFR BLD AUTO: 0.3 % (ref 0–0.5)
LYMPHOCYTES # BLD AUTO: 2.2 K/UL (ref 1–4.8)
LYMPHOCYTES NFR BLD: 25.6 % (ref 18–48)
MCH RBC QN AUTO: 31 PG (ref 27–31)
MCHC RBC AUTO-ENTMCNC: 33.4 G/DL (ref 32–36)
MCV RBC AUTO: 93 FL (ref 82–98)
MONOCYTES # BLD AUTO: 0.6 K/UL (ref 0.3–1)
MONOCYTES NFR BLD: 6.5 % (ref 4–15)
NEUTROPHILS # BLD AUTO: 5.6 K/UL (ref 1.8–7.7)
NEUTROPHILS NFR BLD: 64.9 % (ref 38–73)
NRBC BLD-RTO: 0 /100 WBC
PLATELET # BLD AUTO: 323 K/UL (ref 150–450)
PMV BLD AUTO: 8.7 FL (ref 9.2–12.9)
POTASSIUM SERPL-SCNC: 4.4 MMOL/L (ref 3.5–5.1)
RBC # BLD AUTO: 4.87 M/UL (ref 4–5.4)
SODIUM SERPL-SCNC: 140 MMOL/L (ref 136–145)
SPECIMEN OUTDATE: NORMAL
WBC # BLD AUTO: 8.71 K/UL (ref 3.9–12.7)

## 2023-09-13 PROCEDURE — 80048 BASIC METABOLIC PNL TOTAL CA: CPT | Performed by: ANESTHESIOLOGY

## 2023-09-13 PROCEDURE — 86900 BLOOD TYPING SEROLOGIC ABO: CPT | Performed by: ANESTHESIOLOGY

## 2023-09-13 PROCEDURE — 36415 COLL VENOUS BLD VENIPUNCTURE: CPT | Performed by: ANESTHESIOLOGY

## 2023-09-13 PROCEDURE — 85025 COMPLETE CBC W/AUTO DIFF WBC: CPT | Performed by: ANESTHESIOLOGY

## 2023-09-13 RX ORDER — SODIUM CHLORIDE, SODIUM LACTATE, POTASSIUM CHLORIDE, CALCIUM CHLORIDE 600; 310; 30; 20 MG/100ML; MG/100ML; MG/100ML; MG/100ML
INJECTION, SOLUTION INTRAVENOUS CONTINUOUS
Status: CANCELLED | OUTPATIENT
Start: 2023-09-13

## 2023-09-13 RX ORDER — BISACODYL 5 MG
10 TABLET, DELAYED RELEASE (ENTERIC COATED) ORAL DAILY PRN
COMMUNITY
End: 2023-10-18 | Stop reason: ALTCHOICE

## 2023-09-13 NOTE — DISCHARGE INSTRUCTIONS
Information to Prepare you for your Surgery    PRE-ADMIT TESTING -  [phone]    [address]          Your surgery has been scheduled at [facility]. We are pleased to have the opportunity to serve you. For Further Information please call [phone].    On the day of surgery please report to the Information Desk on the 1st floor.    CONTACT YOUR PHYSICIAN'S OFFICE THE DAY PRIOR TO YOUR SURGERY TO OBTAIN YOUR ARRIVAL TIME.     The evening before surgery do not eat anything after 9 p.m. ( this includes hard candy, chewing gum and mints).  You may only have GATORADE, POWERADE AND WATER  from 9 p.m. until you leave your home.   DO NOT DRINK ANY LIQUIDS ON THE WAY TO THE HOSPITAL.      Why does your anesthesiologist allow you to drink Gatorade/Powerade before surgery?  Gatorade/Powerade helps to increase your comfort before surgery and to decrease your nausea after surgery. The carbohydrates in Gatorade/Powerade help reduce your body's stress response to surgery.  If you are a diabetic-drink only water prior to surgery.    Outpatient Surgery- May allow 2 adult Support Persons (1 being the designated ) for all surgical/procedural patients. A breastfeeding mother will be allowed her infant and 2 adult Support Persons.       SPECIAL MEDICATION INSTRUCTIONS: TAKE medications checked off by the Anesthesiologist on your Medication List.    Angiogram Patients: Take medications as instructed by your physician, including aspirin.     Surgery Patients:    If you take ASPIRIN - Your PHYSICIAN/SURGEON will need to inform you IF/OR when you need to stop taking aspirin prior to your surgery.     The week prior to surgery do not ot take any medications containing IBUPROFEN or NSAIDS ( Advil, Motrin, Goodys, BC, Aleve, Naproxen etc) If you are not sure if you should take a medicine please call your surgeon's office.  Ok to take Tylenol    Do Not Wear any  make-up (especially eye make-up) to surgery. Please remove any false eyelashes or eyelash extensions. If you arrive the day of surgery with makeup/eyelashes on you will be required to remove prior to surgery. (There is a risk of corneal abrasions if eye makeup/eyelash extensions are not removed)      Leave all valuables at home.   Do Not wear any jewelry or watches, including any metal in body piercings. Jewelry must be removed prior to coming to the hospital.  There is a possibility that rings that are unable to be removed may be cut off if they are on the surgical extremity.    Please remove all hair extensions, wigs, clips and any other metal accessories/ ornaments from your hair.  These items may pose a flammable/fire risk in Surgery and must be removed.    Do not shave your surgical area at least 5 days prior to your surgery. The surgical prep will be performed at the hospital according to Infection Control regulations.    Contact Lens must be removed before surgery. Either do not wear the contact lens or bring a case and solution for storage.  Please bring a container for eyeglasses or dentures as required.  Bring any paperwork your physician has provided, such as consent forms,  history and physicals, doctor's orders, etc.   Bring comfortable clothes that are loose fitting to wear upon discharge. Take into consideration the type of surgery being performed.  Maintain your diet as advised per your physician the day prior to surgery.      Adequate rest the night before surgery is advised.   Park in the Parking lot behind the hospital or in the [place] [facility] across the street from the parking lot. Parking is complimentary.  If you will be discharged the same day as your procedure, please arrange for a responsible adult to drive you home or to accompany you if traveling by taxi.   YOU WILL NOT BE PERMITTED TO DRIVE OR TO LEAVE THE HOSPITAL ALONE AFTER SURGERY.   If you are being discharged the same day,  it is strongly recommended that you arrange for someone to remain with you for the first 24 hrs following your surgery.    The Surgeon will speak to your family/visitor after your surgery regarding the outcome of your surgery and post op care.  The Surgeon may speak to you after your surgery, but there is a possibility you may not remember the details.  Please check with your family members regarding the conversation with the Surgeon.    We strongly recommend whoever is bringing you home be present for discharge instructions.  This will ensure a thorough understanding for your post op home care.          Thank you for your cooperation.  The Staff of [facility].            Bathing Instructions with Hibiclens    Shower the evening before and morning of your procedure with Chlorhexidine (Hibiclens)  do not use Chlorhexidine on your face or genitals. Do not get in your eyes.  Wash your face with water and your regular face wash/soap  Use your regular shampoo  Apply Chlorhexidine (Hibiclens) directly on your skin or on a wet washcloth and wash gently. When showering: Move away from the shower stream when applying Chlorhexidine (Hibiclens) to avoid rinsing off too soon.  Rinse thoroughly with warm water  Do not dilute Chlorhexidine (Hibiclens)   Dry off as usual, do not use any deodorant, powder, body lotions, perfume, after shave or cologne.

## 2023-09-13 NOTE — ANESTHESIA PREPROCEDURE EVALUATION
09/13/2023  Sara David is a 68 y.o., female.      Pre-op Assessment    I have reviewed the Patient Summary Reports.     I have reviewed the Nursing Notes. I have reviewed the NPO Status.   I have reviewed the Medications.     Review of Systems  Anesthesia Hx:  No problems with previous Anesthesia  Denies Family Hx of Anesthesia complications.   Denies Personal Hx of Anesthesia complications.   Social:  Non-Smoker, Social Alcohol Use Hx THC use   Hematology/Oncology:  Hematology Normal       -- Cancer in past history:  Other (see Oncology comments) Oncology Comments: Melanoma     Cardiovascular:  Cardiovascular Normal Exercise tolerance: good     Pulmonary:  Pulmonary Normal    Renal/:   Chronic Renal Disease, CKD    Hepatic/GI:  Hepatic/GI Normal    Musculoskeletal:   Arthritis  Hip OA Spine Disorders: lumbar Chronic Pain    Neurological:  Neurology Normal    Endocrine:   Hypothyroidism    Psych:  Psychiatric Normal           Physical Exam  General: Well nourished and Cooperative    Airway:  Mallampati: I   Mouth Opening: Normal  TM Distance: Normal  Neck ROM: Normal ROM    Dental:  Intact        Anesthesia Plan  Type of Anesthesia, risks & benefits discussed:    Anesthesia Type: Gen ETT  Intra-op Monitoring Plan: Standard ASA Monitors  Post Op Pain Control Plan: multimodal analgesia  Induction:  IV  Airway Plan: Video  Informed Consent: Informed consent signed with the Patient and all parties understand the risks and agree with anesthesia plan.  All questions answered.   ASA Score: 2  Day of Surgery Review of History & Physical: H&P Update referred to the surgeon/provider.  Anesthesia Plan Notes: CBC, BMP, T&S    Ready For Surgery From Anesthesia Perspective.     .

## 2023-09-18 ENCOUNTER — ANESTHESIA (OUTPATIENT)
Dept: SURGERY | Facility: OTHER | Age: 68
End: 2023-09-18
Payer: MEDICARE

## 2023-09-18 ENCOUNTER — HOSPITAL ENCOUNTER (OUTPATIENT)
Facility: OTHER | Age: 68
Discharge: HOME | End: 2023-09-18
Attending: OBSTETRICS & GYNECOLOGY | Admitting: OBSTETRICS & GYNECOLOGY
Payer: MEDICARE

## 2023-09-18 DIAGNOSIS — N95.0 POSTMENOPAUSAL BLEEDING: ICD-10-CM

## 2023-09-18 DIAGNOSIS — N95.0 PMB (POSTMENOPAUSAL BLEEDING): ICD-10-CM

## 2023-09-18 DIAGNOSIS — Z90.710 S/P LAPAROSCOPIC HYSTERECTOMY: Primary | ICD-10-CM

## 2023-09-18 DIAGNOSIS — Z78.0 MENOPAUSE: Primary | ICD-10-CM

## 2023-09-18 LAB — POCT GLUCOSE: 89 MG/DL (ref 70–110)

## 2023-09-18 PROCEDURE — D9220A PRA ANESTHESIA: Mod: ANES,,, | Performed by: ANESTHESIOLOGY

## 2023-09-18 PROCEDURE — D9220A PRA ANESTHESIA: Mod: CRNA,,, | Performed by: NURSE ANESTHETIST, CERTIFIED REGISTERED

## 2023-09-18 PROCEDURE — 88302 TISSUE EXAM BY PATHOLOGIST: CPT | Performed by: PATHOLOGY

## 2023-09-18 PROCEDURE — 25000003 PHARM REV CODE 250: Performed by: ANESTHESIOLOGY

## 2023-09-18 PROCEDURE — 63600175 PHARM REV CODE 636 W HCPCS: Performed by: ANESTHESIOLOGY

## 2023-09-18 PROCEDURE — 88307 PR  SURG PATH,LEVEL V: ICD-10-PCS | Mod: 26,,, | Performed by: PATHOLOGY

## 2023-09-18 PROCEDURE — D9220A PRA ANESTHESIA: ICD-10-PCS | Mod: CRNA,,, | Performed by: NURSE ANESTHETIST, CERTIFIED REGISTERED

## 2023-09-18 PROCEDURE — 88307 TISSUE EXAM BY PATHOLOGIST: CPT | Performed by: PATHOLOGY

## 2023-09-18 PROCEDURE — 58571 TLH W/T/O 250 G OR LESS: CPT | Mod: ,,, | Performed by: OBSTETRICS & GYNECOLOGY

## 2023-09-18 PROCEDURE — 88305 TISSUE EXAM BY PATHOLOGIST: ICD-10-PCS | Mod: 26,,, | Performed by: PATHOLOGY

## 2023-09-18 PROCEDURE — 63600175 PHARM REV CODE 636 W HCPCS: Performed by: NURSE ANESTHETIST, CERTIFIED REGISTERED

## 2023-09-18 PROCEDURE — 27201423 OPTIME MED/SURG SUP & DEVICES STERILE SUPPLY: Performed by: OBSTETRICS & GYNECOLOGY

## 2023-09-18 PROCEDURE — 71000039 HC RECOVERY, EACH ADD'L HOUR: Performed by: OBSTETRICS & GYNECOLOGY

## 2023-09-18 PROCEDURE — 88305 TISSUE EXAM BY PATHOLOGIST: CPT | Mod: 26,,, | Performed by: PATHOLOGY

## 2023-09-18 PROCEDURE — 25000003 PHARM REV CODE 250: Performed by: NURSE ANESTHETIST, CERTIFIED REGISTERED

## 2023-09-18 PROCEDURE — 71000033 HC RECOVERY, INTIAL HOUR: Performed by: OBSTETRICS & GYNECOLOGY

## 2023-09-18 PROCEDURE — 36000711: Performed by: OBSTETRICS & GYNECOLOGY

## 2023-09-18 PROCEDURE — 36000710: Performed by: OBSTETRICS & GYNECOLOGY

## 2023-09-18 PROCEDURE — 88307 TISSUE EXAM BY PATHOLOGIST: CPT | Mod: 26,,, | Performed by: PATHOLOGY

## 2023-09-18 PROCEDURE — 37000009 HC ANESTHESIA EA ADD 15 MINS: Performed by: OBSTETRICS & GYNECOLOGY

## 2023-09-18 PROCEDURE — 88305 TISSUE EXAM BY PATHOLOGIST: CPT | Performed by: PATHOLOGY

## 2023-09-18 PROCEDURE — D9220A PRA ANESTHESIA: ICD-10-PCS | Mod: ANES,,, | Performed by: ANESTHESIOLOGY

## 2023-09-18 PROCEDURE — 71000015 HC POSTOP RECOV 1ST HR: Performed by: OBSTETRICS & GYNECOLOGY

## 2023-09-18 PROCEDURE — 88302 PR  SURG PATH,LEVEL II: ICD-10-PCS | Mod: 26,,, | Performed by: PATHOLOGY

## 2023-09-18 PROCEDURE — 63600175 PHARM REV CODE 636 W HCPCS: Performed by: OBSTETRICS & GYNECOLOGY

## 2023-09-18 PROCEDURE — 88302 TISSUE EXAM BY PATHOLOGIST: CPT | Mod: 26,,, | Performed by: PATHOLOGY

## 2023-09-18 PROCEDURE — 58571 PR LAPAROSCOPY W TOT HYSTERECTUTERUS <=250 GRAM  W TUBE/OVARY: ICD-10-PCS | Mod: ,,, | Performed by: OBSTETRICS & GYNECOLOGY

## 2023-09-18 PROCEDURE — 37000008 HC ANESTHESIA 1ST 15 MINUTES: Performed by: OBSTETRICS & GYNECOLOGY

## 2023-09-18 PROCEDURE — 71000016 HC POSTOP RECOV ADDL HR: Performed by: OBSTETRICS & GYNECOLOGY

## 2023-09-18 RX ORDER — DEXTROMETHORPHAN HYDROBROMIDE, GUAIFENESIN 5; 100 MG/5ML; MG/5ML
650 LIQUID ORAL EVERY 6 HOURS
Qty: 30 TABLET | Refills: 1 | Status: SHIPPED | OUTPATIENT
Start: 2023-09-18

## 2023-09-18 RX ORDER — PROPOFOL 10 MG/ML
VIAL (ML) INTRAVENOUS
Status: DISCONTINUED | OUTPATIENT
Start: 2023-09-18 | End: 2023-09-18

## 2023-09-18 RX ORDER — ONDANSETRON 2 MG/ML
INJECTION INTRAMUSCULAR; INTRAVENOUS
Status: DISCONTINUED | OUTPATIENT
Start: 2023-09-18 | End: 2023-09-18

## 2023-09-18 RX ORDER — OXYCODONE HYDROCHLORIDE 5 MG/1
5 TABLET ORAL ONCE
Status: COMPLETED | OUTPATIENT
Start: 2023-09-18 | End: 2023-09-18

## 2023-09-18 RX ORDER — CEFAZOLIN SODIUM 1 G/3ML
2 INJECTION, POWDER, FOR SOLUTION INTRAMUSCULAR; INTRAVENOUS
Status: COMPLETED | OUTPATIENT
Start: 2023-09-18 | End: 2023-09-18

## 2023-09-18 RX ORDER — MEPERIDINE HYDROCHLORIDE 25 MG/ML
12.5 INJECTION INTRAMUSCULAR; INTRAVENOUS; SUBCUTANEOUS ONCE AS NEEDED
Status: DISCONTINUED | OUTPATIENT
Start: 2023-09-18 | End: 2023-09-18 | Stop reason: HOSPADM

## 2023-09-18 RX ORDER — ESTRADIOL 0.04 MG/D
1 PATCH TRANSDERMAL WEEKLY
Qty: 4 PATCH | Refills: 11 | Status: SHIPPED | OUTPATIENT
Start: 2023-09-18 | End: 2023-11-10

## 2023-09-18 RX ORDER — HYDROMORPHONE HYDROCHLORIDE 2 MG/ML
INJECTION, SOLUTION INTRAMUSCULAR; INTRAVENOUS; SUBCUTANEOUS
Status: DISCONTINUED | OUTPATIENT
Start: 2023-09-18 | End: 2023-09-18

## 2023-09-18 RX ORDER — OXYCODONE HYDROCHLORIDE 5 MG/1
5 TABLET ORAL EVERY 4 HOURS PRN
Qty: 15 TABLET | Refills: 0 | Status: SHIPPED | OUTPATIENT
Start: 2023-09-18 | End: 2023-10-18 | Stop reason: ALTCHOICE

## 2023-09-18 RX ORDER — PROCHLORPERAZINE EDISYLATE 5 MG/ML
5 INJECTION INTRAMUSCULAR; INTRAVENOUS EVERY 30 MIN PRN
Status: DISCONTINUED | OUTPATIENT
Start: 2023-09-18 | End: 2023-09-18 | Stop reason: HOSPADM

## 2023-09-18 RX ORDER — ROCURONIUM BROMIDE 10 MG/ML
INJECTION, SOLUTION INTRAVENOUS
Status: DISCONTINUED | OUTPATIENT
Start: 2023-09-18 | End: 2023-09-18

## 2023-09-18 RX ORDER — HYDROMORPHONE HYDROCHLORIDE 2 MG/ML
0.4 INJECTION, SOLUTION INTRAMUSCULAR; INTRAVENOUS; SUBCUTANEOUS EVERY 5 MIN PRN
Status: DISCONTINUED | OUTPATIENT
Start: 2023-09-18 | End: 2023-09-18 | Stop reason: HOSPADM

## 2023-09-18 RX ORDER — SODIUM CHLORIDE, SODIUM LACTATE, POTASSIUM CHLORIDE, CALCIUM CHLORIDE 600; 310; 30; 20 MG/100ML; MG/100ML; MG/100ML; MG/100ML
INJECTION, SOLUTION INTRAVENOUS CONTINUOUS
Status: DISCONTINUED | OUTPATIENT
Start: 2023-09-18 | End: 2023-09-18 | Stop reason: HOSPADM

## 2023-09-18 RX ORDER — OXYCODONE HYDROCHLORIDE 5 MG/1
5 TABLET ORAL
Status: DISCONTINUED | OUTPATIENT
Start: 2023-09-18 | End: 2023-09-18 | Stop reason: HOSPADM

## 2023-09-18 RX ORDER — FENTANYL CITRATE 50 UG/ML
INJECTION, SOLUTION INTRAMUSCULAR; INTRAVENOUS
Status: DISCONTINUED | OUTPATIENT
Start: 2023-09-18 | End: 2023-09-18

## 2023-09-18 RX ORDER — DEXAMETHASONE SODIUM PHOSPHATE 4 MG/ML
INJECTION, SOLUTION INTRA-ARTICULAR; INTRALESIONAL; INTRAMUSCULAR; INTRAVENOUS; SOFT TISSUE
Status: DISCONTINUED | OUTPATIENT
Start: 2023-09-18 | End: 2023-09-18

## 2023-09-18 RX ORDER — LIDOCAINE HYDROCHLORIDE 20 MG/ML
INJECTION INTRAVENOUS
Status: DISCONTINUED | OUTPATIENT
Start: 2023-09-18 | End: 2023-09-18

## 2023-09-18 RX ORDER — SODIUM CHLORIDE 0.9 % (FLUSH) 0.9 %
3 SYRINGE (ML) INJECTION
Status: DISCONTINUED | OUTPATIENT
Start: 2023-09-18 | End: 2023-09-18 | Stop reason: HOSPADM

## 2023-09-18 RX ADMIN — SODIUM CHLORIDE, SODIUM LACTATE, POTASSIUM CHLORIDE, AND CALCIUM CHLORIDE: 600; 310; 30; 20 INJECTION, SOLUTION INTRAVENOUS at 09:09

## 2023-09-18 RX ADMIN — ROCURONIUM BROMIDE 10 MG: 10 SOLUTION INTRAVENOUS at 10:09

## 2023-09-18 RX ADMIN — HYDROMORPHONE HYDROCHLORIDE 0.4 MG: 2 INJECTION INTRAMUSCULAR; INTRAVENOUS; SUBCUTANEOUS at 12:09

## 2023-09-18 RX ADMIN — ROCURONIUM BROMIDE 50 MG: 10 SOLUTION INTRAVENOUS at 08:09

## 2023-09-18 RX ADMIN — OXYCODONE HYDROCHLORIDE 5 MG: 5 TABLET ORAL at 11:09

## 2023-09-18 RX ADMIN — FENTANYL CITRATE 100 MCG: 50 INJECTION, SOLUTION INTRAMUSCULAR; INTRAVENOUS at 08:09

## 2023-09-18 RX ADMIN — GLYCOPYRROLATE 0.2 MG: 0.2 INJECTION, SOLUTION INTRAMUSCULAR; INTRAVITREAL at 08:09

## 2023-09-18 RX ADMIN — HYDROMORPHONE HYDROCHLORIDE 0.4 MG: 2 INJECTION INTRAMUSCULAR; INTRAVENOUS; SUBCUTANEOUS at 11:09

## 2023-09-18 RX ADMIN — SUGAMMADEX 200 MG: 100 INJECTION, SOLUTION INTRAVENOUS at 11:09

## 2023-09-18 RX ADMIN — LIDOCAINE HYDROCHLORIDE 50 MG: 20 INJECTION, SOLUTION INTRAVENOUS at 08:09

## 2023-09-18 RX ADMIN — ROCURONIUM BROMIDE 20 MG: 10 SOLUTION INTRAVENOUS at 09:09

## 2023-09-18 RX ADMIN — PROPOFOL 180 MG: 10 INJECTION, EMULSION INTRAVENOUS at 08:09

## 2023-09-18 RX ADMIN — ONDANSETRON HYDROCHLORIDE 4 MG: 2 INJECTION INTRAMUSCULAR; INTRAVENOUS at 11:09

## 2023-09-18 RX ADMIN — PROPOFOL 20 MG: 10 INJECTION, EMULSION INTRAVENOUS at 08:09

## 2023-09-18 RX ADMIN — FENTANYL CITRATE 50 MCG: 50 INJECTION, SOLUTION INTRAMUSCULAR; INTRAVENOUS at 09:09

## 2023-09-18 RX ADMIN — DEXAMETHASONE SODIUM PHOSPHATE 8 MG: 4 INJECTION, SOLUTION INTRAMUSCULAR; INTRAVENOUS at 08:09

## 2023-09-18 RX ADMIN — FENTANYL CITRATE 50 MCG: 50 INJECTION, SOLUTION INTRAMUSCULAR; INTRAVENOUS at 08:09

## 2023-09-18 RX ADMIN — ROCURONIUM BROMIDE 20 MG: 10 SOLUTION INTRAVENOUS at 10:09

## 2023-09-18 RX ADMIN — SODIUM CHLORIDE, SODIUM LACTATE, POTASSIUM CHLORIDE, AND CALCIUM CHLORIDE: 600; 310; 30; 20 INJECTION, SOLUTION INTRAVENOUS at 07:09

## 2023-09-18 RX ADMIN — GLYCOPYRROLATE 0.2 MG: 0.2 INJECTION, SOLUTION INTRAMUSCULAR; INTRAVITREAL at 12:09

## 2023-09-18 RX ADMIN — OXYCODONE HYDROCHLORIDE 5 MG: 5 TABLET ORAL at 01:09

## 2023-09-18 RX ADMIN — CEFAZOLIN 2 G: 330 INJECTION, POWDER, FOR SOLUTION INTRAMUSCULAR; INTRAVENOUS at 08:09

## 2023-09-18 NOTE — BRIEF OP NOTE
[facility] - Surgery ([place])  Brief Operative Note    SUMMARY     Surgery Date: 9/18/2023     Surgeon(s) and Role:     * Priti Wynn MD - Primary     * Kristie Kennedy MD - Resident - Assisting      Pre-op Diagnosis:  Postmenopausal bleeding [N95.0]    Post-op Diagnosis: Post-Op Diagnosis Codes:     * Postmenopausal bleeding [N95.0]      Procedure(s) (LRB):  HYSTERECTOMY, TOTAL, LAPAROSCOPIC (N/A)  SALPINGO-OOPHORECTOMY (Right)  SALPINGECTOMY, LAPAROSCOPIC, PARTIAL (Left)  CYSTOSCOPY (N/A)    Operative Findings: Absent left ovary consistent with previous oophorectomy. 8 cm uterus with multiple subserosal fibroids. Right fallopian tube and ovary normal appearing. Left fallopian tube adhered to left pelvic side wall. Dense bladder adhesions to anterior lower uterine segment. Sigmoid colon adhered to BL uterosacral ligaments. TLH/RSO and L partial salpingectomy performed without complication. Cystoscopy revealed efflux from BL UOs.    Estimated Blood Loss: 50 mL         Specimens:   Specimen (24h ago, onward)       Start     Ordered    09/18/23 1125  Specimen to Pathology, Surgery Gynecology and Obstetrics  Once        Comments: Pre-op Diagnosis: Postmenopausal bleeding [N95.0]Procedure(s):HYSTERECTOMY, TOTAL, LAPAROSCOPIC Number of specimens: 3Name of specimens: 1.LEFT FIMBRIA                                  2. RIGHT OVARY                                  3. UTERUS AND CERVIX     References:    Click here for ordering Quick Tip   Question Answer Comment   Procedure Type: Gynecology and Obstetrics    Which provider would you like to cc? PRITI WYNN    Release to patient Immediate        09/18/23 1124                    Kristie Kennedy MD  PGY-3 OB/GYN

## 2023-09-18 NOTE — INTERVAL H&P NOTE
The patient has been examined and the H&P has been reviewed:    I concur with the findings and no changes have occurred since H&P was written.    Surgery risks, benefits and alternative options discussed and understood by patient/family. Surgery and blood consents signed previously in clinic.    To OR for TLH/USO for recurrent PMB    Kristie Kennedy MD  PGY-3 OB/GYN

## 2023-09-18 NOTE — DISCHARGE SUMMARY
[facility]  Brief Op Note/Discharge Note  Short Stay    Admit Date: 9/18/2023    Discharge Date: 09/18/2023    Attending Physician: Priti Wynn MD     Surgery Date: 9/18/2023     Surgeon(s) and Role:     * Priti Wynn MD - Primary     * Kristie Kennedy MD - Resident - Assisting    Pre-op Diagnosis:  Postmenopausal bleeding [N95.0]    Post-op Diagnosis:  Post-Op Diagnosis Codes:     * Postmenopausal bleeding [N95.0]    Procedure(s) (LRB):  HYSTERECTOMY, TOTAL, LAPAROSCOPIC (N/A)  SALPINGO-OOPHORECTOMY (Right)  SALPINGECTOMY, LAPAROSCOPIC, PARTIAL (Left)  CYSTOSCOPY (N/A)    Anesthesia: Choice    Findings/Key Components: Absent left ovary consistent with previous oophorectomy. 8 cm uterus with multiple subserosal fibroids. Right fallopian tube and ovary normal appearing. Left fallopian tube adhered to left pelvic side wall. Dense bladder adhesions to anterior lower uterine segment. Sigmoid colon adhered to BL uterosacral ligaments. TLH/RSO and L partial salpingectomy performed without complication. Cystoscopy revealed efflux from BL UOs.      Estimated Blood Loss: 50 mL         Specimens:   Specimen (24h ago, onward)       Start     Ordered    09/18/23 1125  Specimen to Pathology, Surgery Gynecology and Obstetrics  Once        Comments: Pre-op Diagnosis: Postmenopausal bleeding [N95.0]Procedure(s):HYSTERECTOMY, TOTAL, LAPAROSCOPIC Number of specimens: 3Name of specimens: 1.LEFT FIMBRIA                                  2. RIGHT OVARY                                  3. UTERUS AND CERVIX     References:    Click here for ordering Quick Tip   Question Answer Comment   Procedure Type: Gynecology and Obstetrics    Which provider would you like to cc? PRITI WYNN    Release to patient Immediate        09/18/23 1124                    Discharge Provider: Kristie Kennedy    Diagnoses:  Active Hospital Problems    Diagnosis  POA    *S/P total laparoscopic hysterectomy/RSO/L fimbria  removal/cystoscopy [Z90.710]  No      Resolved Hospital Problems   No resolved problems to display.       Discharged Condition: good    Hospital Course:   Patient was admitted for outpatient procedure as above, and tolerated the procedure well with no complications. Please see operative report for further details. Following the procedure, the patient was awakened from anesthesia and transferred to the recovery area in stable condition. She was discharged to home once ambulating, tolerating PO intake, and pain was well-controlled. Patient was unable to void after active void trial so the welch catheter was replaced and patient was discharged with a welch. She is to follow up with primary OBGYN later this week for removal. Patient was given routine post-op instructions and prescriptions for pain medication to take as needed. Patient instructed to follow up with Dr. Wnyn in 6 weeks for postop visit as well.    Final Diagnoses: Same as principal problem.    Disposition: Home or Self Care    Follow up/Patient Instructions:    Medications:  Reconciled Home Medications:      Medication List        START taking these medications      acetaminophen 650 MG Tbsr  Commonly known as: TYLENOL  Take 1 tablet (650 mg total) by mouth every 6 (six) hours.  Replaces: acetaminophen 500 MG tablet     oxyCODONE 5 MG immediate release tablet  Commonly known as: ROXICODONE  Take 1 tablet (5 mg total) by mouth every 4 (four) hours as needed for Pain.            CHANGE how you take these medications      traZODone 50 MG tablet  Commonly known as: DESYREL  TAKE 1 TABLET(50 MG) BY MOUTH NIGHTLY AS NEEDED FOR INSOMNIA  What changed:   how much to take  how to take this  when to take this  additional instructions            CONTINUE taking these medications      bisacodyL 5 mg EC tablet  Commonly known as: DULCOLAX  Take 10 mg by mouth daily as needed for Constipation.     cetirizine 10 MG tablet  Commonly known as: ZYRTEC  Take 10 mg by mouth  once daily.     levothyroxine 88 MCG tablet  Commonly known as: SYNTHROID  Take 1 tablet (88 mcg total) by mouth before breakfast.     VITAMIN D ORAL  Take by mouth. 1000 mcg PO DAILY            STOP taking these medications      acetaminophen 500 MG tablet  Commonly known as: TYLENOL  Replaced by: acetaminophen 650 MG Tbsr     CLIMARA PRO 0.045-0.015 mg/24 hr  Generic drug: estradioL-levonorgestreL     meloxicam 15 MG tablet  Commonly known as: MOBIC            ASK your doctor about these medications      estradioL 0.0375 mg/24 hr  Commonly known as: CLIMARA  Place 1 patch onto the skin once a week.            Discharge Procedure Orders   Diet general     Lifting restrictions   Order Comments: No lifting greater than 15 pounds for six weeks.     Other restrictions (specify):   Order Comments: PELVIC REST (IF YOU HAD A HYSTERECTOMY):  No douching, tampons, or intercourse for 6 weeks.    If prescribed, vaginal estrogen cream may be used during the postoperative period.     DRIVING:  No driving while on narcotics. Driving may be resumed initially with a competent passenger one to two weeks after surgery if no longer taking narcotics.     EXERCISE:  For six weeks your exercise should be limited to walking. You may walk as far as you wish, as long as you increase your level of exertion gradually and avoid slippery surfaces. You may climb stairs as needed to get around, but should not use stair climbing for exercise.     Remove dressing in 24 hours   Order Comments: If you have a bandage on wound, you may remove it the day after dismissal.  If you had steri-strips remove them once they begin to peel off (usually 2 weeks).  If your steri-strips still haven't come off in 2 weeks, please remove them.  Keep incision clean and dry.  Inspect the incision daily for signs and symptoms of infection.     Wound care routine (specify)   Order Comments: WOUND CARE:  If you have a band-aid or bandage on your wound, you may remove it  the day after dismissal.  You may wash the wound with mild soap and water.   You may shower at any time but should avoid immersing any abdominal incisions in water for at least two weeks after surgery or until the wound is completely healed. If given, please shower with Hibiclens soap until bottle is completely finished. Keep your wound clean and dry.  You should observe your incision for signs of infection which include redness, warmth, drainage or fever.     Call MD for:  temperature >100.4     Call MD for:  persistent nausea and vomiting     Call MD for:  severe uncontrolled pain     Call MD for:  difficulty breathing, headache or visual disturbances     Call MD for:  redness, tenderness, or signs of infection (pain, swelling, redness, odor or green/yellow discharge around incision site)     Call MD for:  hives     Call MD for:   Order Comments: inability to void,urine is ketchup colored or you have large clots, vaginal bleeding is heavier than a period.    VAGINAL DISCHARGE: You may develop a vaginal discharge and intermittent vaginal spotting after surgery and up to 6 weeks postoperatively.  The discharge may have an odor and may change in color but it is normal.  This is due to dissolving stiches.  Contact your surgical team if you develop vaginal or vulvar irritation along with a discharge.  Also contact your surgical team if you have vaginal discharge that smells like urine or stool.    CONSTIPATION REMEDIES: Patients are often constipated after surgery or with use of oral narcotic medicine. You should continue to take the stool softener, Senokot-S during the next six weeks, and consume adequate amounts of water.  If you have not had a bowel movement for 3 days after dismissal, or are uncomfortable and unable to pass stool, please try one or all of the following measures:  1.  Milk of Magnesia - 30 cc by mouth every 12 hours   2.  Dulcolax suppository - One suppository per rectum every 4-6 hours   3.   Metamucil, Fibercon or other bulk former - use as directed  4.  Fleets Enema      PAIN MEDICATIONS:     Take your pain medications as instructed. It is best to take pain medications before your pain becomes severe. This will allow you to take less medication yet have better pain relief. For the first 2 or 3 days it may be helpful to take your pain medications on a regular schedule (e.g. every 4 to 6 hours). This will help you to keep your pain under better control. You should then begin to take fewer medications each day until you no longer need them. Do not take pain medication on an empty stomach. This may lead to nausea and vomiting.     Activity as tolerated   Order Comments: PELVIC REST:  No douching, tampons, or intercourse for 6 weeks.    If prescribed, vaginal estrogen cream may be used during the postoperative period.     DRIVING:  No driving while on narcotics. Driving may be resumed initially with a competent passenger one to two weeks after surgery if no longer taking narcotics.     EXERCISE:  For six weeks your exercise should be limited to walking. You may walk as far as you wish, as long as you increase your level of exertion gradually and avoid slippery surfaces. You may climb stairs as needed to get around, but should not use stair climbing for exercise.     Shower on day dressing removed (No bath)   Order Comments: You may shower at any time but should avoid immersing any abdominal incisions in water for at least 2 weeks after surgery or until the wound is completely healed.  If given, please shower with Hibaclens soap until bottle is completely finish      Follow-up Information       Meche Wynn MD Follow up in 6 week(s).    Specialty: Obstetrics and Gynecology  Why: Post op  Contact information:  [address] [facility] [address] [phone]                              Kristie Kennedy MD  PGY-3 OB/GYN

## 2023-09-18 NOTE — DISCHARGE INSTRUCTIONS
An indwelling urinary catheter is a flexible plastic tube that is inserted through the opening that carries urine from the  bladder to the outside of the body (urethra), to drain urine. The tube is kept in place by a small balloon that is inflated  once the tube is securely in the bladder. Urine drains into a bag that is attached to the thigh through this catheter.      To care for your urinary catheter at home:     Make sure that urine is flowing out of the catheter into the drainage bag.   Check the area around the insertion site for signs of infections (such as inflammation and irritated/swollen/  red/tender skin), and/or leakage of urine around the catheter   Keep the urinary drainage bag below the level of the bladder (to prevent backflow into the bladder).   Make sure that the urinary drainage bag does not drag and pull on the catheter.    Caring for your catheter:     Clean the area around the drainage tube twice each day.   Use a mild soap and water around the drainage tube.   Rinse well and dry with a clean towel.      Draining the urine collection bag:    The bag that collects urine may be strapped to your thigh. You will need to empty the bag at regular intervals,  typically every 2 to 4 hours, or whenever the catheter bag is half-full, and at bedtime.   Wash your hands with soap and water. If you are emptying another persons catheter bag, you may wish to wear  disposable gloves. Wash your hands before you put the gloves on and after removing them.

## 2023-09-18 NOTE — TRANSFER OF CARE
Anesthesia Transfer of Care Note    Patient: Sara David    Procedure(s) Performed: Procedure(s) (LRB):  HYSTERECTOMY, TOTAL, LAPAROSCOPIC (N/A)  SALPINGO-OOPHORECTOMY (Right)  SALPINGECTOMY, LAPAROSCOPIC, PARTIAL (Left)  CYSTOSCOPY (N/A)    Patient location: PACU    Anesthesia Type: general    Transport from OR: Transported from OR on 6-10 L/min O2 by face mask with adequate spontaneous ventilation    Post pain: adequate analgesia    Post assessment: no apparent anesthetic complications    Post vital signs: stable    Level of consciousness: awake    Nausea/Vomiting: no nausea/vomiting    Complications: none    Transfer of care protocol was followed      Last vitals:   Visit Vitals  /65 (BP Location: Right arm, Patient Position: Lying)   Pulse 66   Temp 36.2 °C (97.1 °F) (Skin)   Resp 18   LMP 01/17/2020   SpO2 100%   Breastfeeding No

## 2023-09-18 NOTE — OR NURSING
Patient arrived to unit complaining of 6 out of 10 pain. Dr. Sam ordered for a dose of PO 5 mg oxy to be given now.

## 2023-09-18 NOTE — OP NOTE
OPERATIVE REPORT     DATE: 09/18/2023       PREOPERATIVE DIAGNOSIS  1. Recurrent PMB   2. Pelvic pain  3. Fibroid uterus     POSTOPERATIVE DIAGNOSIS  Same as above  S/p TLH/RSO/L partial salpingectomy/cystoscopy     PROCEDURE:  1. Total Laparoscopic Hysterectomy  2. Right salpingo-ophorectomy  3. Left partial salpingectomy  4. Cystoscopy     SURGEON: Meche Wynn MD     ASSISTANTS: Kristie Kennedy MD - PGY3     ANESTHESIA: General     COMPLICATIONS: None     EBL: 50  cc     IV FLUIDS: See anesthesia report     URINE OUTPUT: see anesthesia report     FINDINGS:   Absent left ovary consistent with previous oophorectomy. 8 cm uterus with multiple subserosal fibroids. Right fallopian tube and ovary normal appearing. Left fallopian tube adhered to left pelvic side wall. Dense bladder adhesions to anterior lower uterine segment. Sigmoid colon adhered to BL uterosacral ligaments. TLH/RSO and L partial salpingectomy performed without complication. Cystoscopy revealed efflux from BL UOs.      PROCEDURE IN DETAIL:  Patient was taken to the operating room where general anesthesia was administered and found to be adequate. She was placed in the dorsal lithotomy position using Mendoza stirrups and prepped and draped in the usual sterile fashion. A surgical timeout was performed with patient's name, date of birth, allergies, and procedure to be performed verbalized. All OR staff were in agreement. Preoperative antibiotics ancef 2g were administered.    Attention was then turned to the vagina. Field was placed. A right angle retractors were placed in the vagina to visualize the cervix. The anterior lip of the cervix was grasped with a single tooth tenaculum. The cervix was serially dilated to a 18 Hanks dilator. The uterus was sounded to approximately 8  cm. A stay suture of 0-Vicryl was placed at 12 o'clock and 6 o'clock on the cervix. A MELISSA manipulator was placed inside the uterine cavity, and vagina was occluded.      Gloves  were changed. A Veress needle was inserted into the umbilicus under tenting of the anterior abdominal wall. Placement into the peritoneal cavity was confirmed via saline drop test. The abdomen was insufflated to 15mm Hg using Carbon dioxide. A 10 mm infraumbilical skin incision was made with the scalpel. A 10 mm trocar was advanced through this incision. Good hemostasis was noted. The patient was placed in deep Trendelenburg. An 5 mm left lateral skin incision was made with a scalpel and a 5 mm trocar was advanced through this incision under visualization of the camera. A 5 mm right lateral skin incision was made with the scalpel. A 5 mm trocar was advanced through this incision under visualization of the camera. Good hemostasis was noted. Survey of the abdomen and pelvis revealed findings as described above.      Attention was turned to the right round ligament. This was cauterized and transected and dissection was continued anteriorly to create the bladder flap to the midline. The bladder was densely adhered to the right aspect of the anterior lower uterine segment. These adhesions were carefully dissected using both blunt dissection and cautery. The right ureter was identified and noted to be well out of the operative field. The right uteroovarian ligament was then identified, doubly cauterized using the Ligasure, and transected. The posterior leaf of the right  broad ligament was serially cauterized and transected and carried down to the level of the uterine vessels.     Attention was turned to the left round ligament. It was cauterized and transected and continued anteriorly to finish creating the bladder flap.The left ureter was identified and noted to be well out of the operative field. The posterior leaf of the right broad ligament was serially cauterized and transected and carried down to the level of the uterine vessels.     Attention was then turned to the right IP ligament. The ureter was again  identified and noted to be well out of the operative field. The IP ligament was then doubly cauterized using the Ligasure and transected. The right fallopian tube was grasped at the fimbriated end and elevated, and the mesosalpinx was serially cauterized and transected using the Ligasure until the right tube and ovary were free.    The uterine vessels were then cauterized and transected bilaterally using the Ligasure. Attention was turned to the anterior portion of the cervix. The bladder was further dissected off the cervix, and anterior colpotomy was created and carried around in a circumferential fashion until the entire colpotomy was created. Careful attention was made to avoid the sigmoid colon adhesions to the posterior aspect of the uterus.    The uterus and cervix were removed vaginally. The right tube and ovary were then removed vaginally. A moistened laparotomy sponge within a glove was placed in the vagina for reocclusion. The vaginal cuff was then closed with mattress sutures of 0 vicryl using the Endostitch. Good hemostasis was noted. The pelvis was copiously irrigated and suctioned. Good hemostasis was again noted.    Welch was then removed for cystoscopy. The cystoscope was introduced into the bladder and inspection of the dome showed no evidence of cystotomy or injury. Good efflux of urine was noted from bilateral ureteral orifices. Cystoscope was withdrawn, and welch was replaced.     Attention was turned to the anterior abdominal wall. The fascia at the 10 mm port was closed with an interrupted suture of 0 vicryl using the suture ease device. The abdomen was deflated and all trocars were removed. The skin incisions was closed with 4-0 Monocryl in a subcuticular fashion. Sponge, lap, and needle counts were correct x 2. The patient was taken to the recovery room in stable condition with the welch draining urine.     Kristie Kennedy MD  PGY-3 OB/GYN      Attestation:  I was present and scrubbed for the  entire procedure.     Meche Wynn MD  OB/GYN

## 2023-09-18 NOTE — OR NURSING
VSS on RA. Pain is tolerable per pt. X3 lap sites with steri strips and bandaids, CDI. PIV CDI. Meets PACU discharge criteria. Ready for transfer to phase II. Family notified.

## 2023-09-18 NOTE — OR NURSING
Call received from resident on call, she states she spoke with Dr. Wynn and she ordered for catheter to be replaced and to discharge patient with catheter.

## 2023-09-18 NOTE — ANESTHESIA POSTPROCEDURE EVALUATION
Anesthesia Post Evaluation    Patient: Sara David    Procedure(s) Performed: Procedure(s) (LRB):  HYSTERECTOMY, TOTAL, LAPAROSCOPIC (N/A)  SALPINGO-OOPHORECTOMY (Right)  SALPINGECTOMY, LAPAROSCOPIC, PARTIAL (Left)  CYSTOSCOPY (N/A)    Final Anesthesia Type: general      Patient location during evaluation: PACU  Patient participation: Yes- Able to Participate  Level of consciousness: awake and alert  Post-procedure vital signs: reviewed and stable  Pain management: adequate  Airway patency: patent  DERREK mitigation strategies: Extubation while patient is awake  PONV status at discharge: No PONV  Anesthetic complications: no      Cardiovascular status: hemodynamically stable  Respiratory status: unassisted  Hydration status: euvolemic  Follow-up not needed.          Vitals Value Taken Time   /71 09/18/23 1253   Temp 36.2 °C (97.1 °F) 09/18/23 1136   Pulse 73 09/18/23 1312   Resp 18 09/18/23 1319   SpO2 99 % 09/18/23 1312   Vitals shown include unvalidated device data.      Event Time   Out of Recovery 12:49:00         Pain/Caridad Score: Pain Rating Prior to Med Admin: 6 (9/18/2023  1:19 PM)  Pain Rating Post Med Admin: 4 (9/18/2023 12:30 PM)  Caridad Score: 10 (9/18/2023 12:36 PM)

## 2023-09-18 NOTE — ANESTHESIA PROCEDURE NOTES
Intubation    Date/Time: 9/18/2023 8:10 AM    Performed by: Roxy Álvarez CRNA  Authorized by: Júnior Mukherjee MD    Intubation:     Induction:  Intravenous    Intubated:  Postinduction    Mask Ventilation:  Easy mask    Attempts:  1    Attempted By:  CRNA    Method of Intubation:  Video laryngoscopy    Blade:  Hannah 3    Laryngeal View Grade: Grade I - full view of cords      Difficult Airway Encountered?: No      Complications:  None    Airway Device:  Oral endotracheal tube    Airway Device Size:  7.5    Style/Cuff Inflation:  Cuffed (inflated to minimal occlusive pressure)    Inflation Amount (mL):  5    Tube secured:  22    Secured at:  The lips    Placement Verified By:  Capnometry    Complicating Factors:  None    Findings Post-Intubation:  BS equal bilateral and atraumatic/condition of teeth unchanged

## 2023-09-19 ENCOUNTER — TELEPHONE (OUTPATIENT)
Dept: OBSTETRICS AND GYNECOLOGY | Facility: CLINIC | Age: 68
End: 2023-09-19
Payer: MEDICARE

## 2023-09-19 VITALS
DIASTOLIC BLOOD PRESSURE: 69 MMHG | TEMPERATURE: 98 F | SYSTOLIC BLOOD PRESSURE: 144 MMHG | OXYGEN SATURATION: 100 % | HEART RATE: 61 BPM | RESPIRATION RATE: 14 BRPM

## 2023-09-19 NOTE — TELEPHONE ENCOUNTER
Called patient. Reviewed findings from surgery. Overall doing well, having some shoulder pain.     She has welch catheter in place after failed voiding trial.     Will schedule voiding trial tomorrow at 1 pm.

## 2023-09-20 ENCOUNTER — PROCEDURE VISIT (OUTPATIENT)
Dept: OBSTETRICS AND GYNECOLOGY | Facility: CLINIC | Age: 68
End: 2023-09-20
Payer: MEDICARE

## 2023-09-20 ENCOUNTER — PATIENT MESSAGE (OUTPATIENT)
Dept: OBSTETRICS AND GYNECOLOGY | Facility: CLINIC | Age: 68
End: 2023-09-20

## 2023-09-20 DIAGNOSIS — R33.9 URINARY RETENTION: Primary | ICD-10-CM

## 2023-09-20 PROCEDURE — 99499 UNLISTED E&M SERVICE: CPT | Mod: S$GLB,,, | Performed by: OBSTETRICS & GYNECOLOGY

## 2023-09-20 PROCEDURE — 99499 NO LOS: ICD-10-PCS | Mod: S$GLB,,, | Performed by: OBSTETRICS & GYNECOLOGY

## 2023-09-22 LAB
FINAL PATHOLOGIC DIAGNOSIS: NORMAL
Lab: NORMAL

## 2023-09-25 ENCOUNTER — PATIENT MESSAGE (OUTPATIENT)
Dept: DERMATOLOGY | Facility: CLINIC | Age: 68
End: 2023-09-25
Payer: MEDICARE

## 2023-09-25 ENCOUNTER — PATIENT MESSAGE (OUTPATIENT)
Dept: OBSTETRICS AND GYNECOLOGY | Facility: CLINIC | Age: 68
End: 2023-09-25
Payer: MEDICARE

## 2023-09-26 ENCOUNTER — NURSE TRIAGE (OUTPATIENT)
Dept: ADMINISTRATIVE | Facility: CLINIC | Age: 68
End: 2023-09-26
Payer: MEDICARE

## 2023-09-26 ENCOUNTER — PATIENT MESSAGE (OUTPATIENT)
Dept: OBSTETRICS AND GYNECOLOGY | Facility: CLINIC | Age: 68
End: 2023-09-26
Payer: MEDICARE

## 2023-09-26 NOTE — TELEPHONE ENCOUNTER
Reason for Disposition   Nursing judgment     Specialist    Protocols used: Information Only Call - No Triage-A-OH  Pt states she had a lap hysterectomy by Dr. Wynn on 9/18/23. States she feels great and wants to know if she can go on a trip she scheduled for Oct 4th. Pt also wants to know what activities she can start doing now. Pt advised this message will go to the Provider and Office nurse now to contact her today with instructions. She verbalized understanding.

## 2023-09-29 ENCOUNTER — PATIENT MESSAGE (OUTPATIENT)
Dept: OBSTETRICS AND GYNECOLOGY | Facility: CLINIC | Age: 68
End: 2023-09-29
Payer: MEDICARE

## 2023-10-09 ENCOUNTER — PATIENT MESSAGE (OUTPATIENT)
Dept: OBSTETRICS AND GYNECOLOGY | Facility: CLINIC | Age: 68
End: 2023-10-09
Payer: MEDICARE

## 2023-10-09 RX ORDER — SULFAMETHOXAZOLE AND TRIMETHOPRIM 800; 160 MG/1; MG/1
1 TABLET ORAL 2 TIMES DAILY
Qty: 6 TABLET | Refills: 0 | Status: SHIPPED | OUTPATIENT
Start: 2023-10-09 | End: 2023-10-12

## 2023-10-15 ENCOUNTER — PATIENT MESSAGE (OUTPATIENT)
Dept: OBSTETRICS AND GYNECOLOGY | Facility: CLINIC | Age: 68
End: 2023-10-15
Payer: MEDICARE

## 2023-10-18 ENCOUNTER — OFFICE VISIT (OUTPATIENT)
Dept: URGENT CARE | Facility: CLINIC | Age: 68
End: 2023-10-18
Payer: MEDICARE

## 2023-10-18 VITALS
RESPIRATION RATE: 19 BRPM | OXYGEN SATURATION: 96 % | HEIGHT: 71 IN | TEMPERATURE: 99 F | WEIGHT: 200 LBS | BODY MASS INDEX: 28 KG/M2 | HEART RATE: 76 BPM | DIASTOLIC BLOOD PRESSURE: 82 MMHG | SYSTOLIC BLOOD PRESSURE: 128 MMHG

## 2023-10-18 DIAGNOSIS — N89.8 VAGINAL DISCHARGE: Primary | ICD-10-CM

## 2023-10-18 DIAGNOSIS — R30.0 DYSURIA: ICD-10-CM

## 2023-10-18 DIAGNOSIS — K12.1 VIRAL STOMATITIS: ICD-10-CM

## 2023-10-18 DIAGNOSIS — J01.00 SUBACUTE MAXILLARY SINUSITIS: ICD-10-CM

## 2023-10-18 DIAGNOSIS — B97.89 VIRAL STOMATITIS: ICD-10-CM

## 2023-10-18 DIAGNOSIS — B37.31 YEAST VAGINITIS: ICD-10-CM

## 2023-10-18 LAB
BILIRUB UR QL STRIP: NEGATIVE
GLUCOSE UR QL STRIP: NEGATIVE
KETONES UR QL STRIP: NEGATIVE
LEUKOCYTE ESTERASE UR QL STRIP: NEGATIVE
PH, POC UA: 5 (ref 5–8)
POC BLOOD, URINE: NEGATIVE
POC NITRATES, URINE: NEGATIVE
PROT UR QL STRIP: NEGATIVE
SP GR UR STRIP: 1.01 (ref 1–1.03)
UROBILINOGEN UR STRIP-ACNC: NORMAL (ref 0.1–1.1)

## 2023-10-18 PROCEDURE — 81003 URINALYSIS AUTO W/O SCOPE: CPT | Mod: QW,S$GLB,, | Performed by: FAMILY MEDICINE

## 2023-10-18 PROCEDURE — 87086 URINE CULTURE/COLONY COUNT: CPT | Performed by: FAMILY MEDICINE

## 2023-10-18 PROCEDURE — 99214 OFFICE O/P EST MOD 30 MIN: CPT | Mod: S$GLB,,, | Performed by: FAMILY MEDICINE

## 2023-10-18 PROCEDURE — 99214 PR OFFICE/OUTPT VISIT, EST, LEVL IV, 30-39 MIN: ICD-10-PCS | Mod: S$GLB,,, | Performed by: FAMILY MEDICINE

## 2023-10-18 PROCEDURE — 81003 POCT URINALYSIS, DIPSTICK, AUTOMATED, W/O SCOPE: ICD-10-PCS | Mod: QW,S$GLB,, | Performed by: FAMILY MEDICINE

## 2023-10-18 RX ORDER — FLUCONAZOLE 150 MG/1
150 TABLET ORAL DAILY
Qty: 2 TABLET | Refills: 0 | Status: SHIPPED | OUTPATIENT
Start: 2023-10-18 | End: 2023-10-20

## 2023-10-18 RX ORDER — AMOXICILLIN AND CLAVULANATE POTASSIUM 875; 125 MG/1; MG/1
1 TABLET, FILM COATED ORAL 2 TIMES DAILY
Qty: 20 TABLET | Refills: 0 | Status: SHIPPED | OUTPATIENT
Start: 2023-10-18 | End: 2023-10-28

## 2023-10-18 RX ORDER — VALACYCLOVIR HYDROCHLORIDE 1 G/1
1000 TABLET, FILM COATED ORAL EVERY 12 HOURS
Qty: 10 TABLET | Refills: 0 | Status: SHIPPED | OUTPATIENT
Start: 2023-10-18 | End: 2023-10-23

## 2023-10-18 NOTE — PROGRESS NOTES
"Subjective:      Patient ID: Sara David is a 68 y.o. female.    Vitals:  height is 5' 11" (1.803 m) and weight is 90.7 kg (200 lb). Her oral temperature is 99 °F (37.2 °C). Her blood pressure is 128/82 and her pulse is 76. Her respiration is 19 and oxygen saturation is 96%.     Chief Complaint: Sore Throat    Patient presents with sore throat, headache, body aches, and earache . Symptoms onset form cold symptoms 2 weeks- she has had sinusitis in the past with similar symptoms. Now in past 2 days she has worsening sore throat-on the left.    Also patient has dysuria, discharge, odor. Onset 1 week (she did have a hysterectomy 3 weeks ago but symptoms started just last week). She denies abdominal pain or fever or chills.denies urine frequency or urgency. Otc decongest and tylenol      Sore Throat   This is a new problem. Episode onset: -2weeks. The problem has been unchanged. There has been no fever. The pain is at a severity of 2/10. The pain is mild. Associated symptoms include ear pain and headaches. Pertinent negatives include no coughing. She has tried acetaminophen for the symptoms. The treatment provided no relief.       HENT:  Positive for ear pain and sore throat.    Respiratory:  Negative for cough.    Neurological:  Positive for headaches.      Objective:     Physical Exam   Constitutional: She is oriented to person, place, and time. She appears well-developed. She is cooperative.  Non-toxic appearance. She does not appear ill. No distress.   HENT:   Head: Normocephalic and atraumatic.   Ears:   Right Ear: Hearing, tympanic membrane, external ear and ear canal normal.   Left Ear: Hearing, tympanic membrane, external ear and ear canal normal.   Nose: Nose normal. No mucosal edema, rhinorrhea or nasal deformity. No epistaxis. Right sinus exhibits no maxillary sinus tenderness and no frontal sinus tenderness. Left sinus exhibits no maxillary sinus tenderness and no frontal sinus tenderness. "   Mouth/Throat: Uvula is midline, oropharynx is clear and moist and mucous membranes are normal. Mucous membranes are moist. No trismus in the jaw. Normal dentition. No uvula swelling. No oropharyngeal exudate, posterior oropharyngeal edema or posterior oropharyngeal erythema.      Comments: Mild erythema of posterior pharynx with some small white ulcers on erythematous base of left pharynx  Eyes: Conjunctivae and lids are normal. No scleral icterus.   Neck: Trachea normal and phonation normal. Neck supple. No edema present. No erythema present. No neck rigidity present.   Cardiovascular: Normal rate, regular rhythm, normal heart sounds and normal pulses.   Pulmonary/Chest: Effort normal and breath sounds normal. No respiratory distress. She has no decreased breath sounds. She has no rhonchi.   Abdominal: Normal appearance. flat abdomen There is no abdominal tenderness.   Musculoskeletal: Normal range of motion.         General: No deformity or edema. Normal range of motion.   Lymphadenopathy:     She has no cervical adenopathy.   Neurological: She is alert and oriented to person, place, and time. She exhibits normal muscle tone. Coordination normal.   Skin: Skin is warm, dry, intact, not diaphoretic and not pale.   Psychiatric: Her speech is normal and behavior is normal. Judgment and thought content normal.   Nursing note and vitals reviewed.      Assessment:     1. Vaginal discharge    2. Dysuria    3. Viral stomatitis    4. Subacute maxillary sinusitis    5. Yeast vaginitis        Plan:       Vaginal discharge  -     Vaginosis Screen by DNA Probe  -     Urine culture    Dysuria  -     POCT Urinalysis, Dipstick, Automated, W/O Scope  -     Urine culture    Viral stomatitis  -     valACYclovir (VALTREX) 1000 MG tablet; Take 1 tablet (1,000 mg total) by mouth every 12 (twelve) hours. for 5 days  Dispense: 10 tablet; Refill: 0    Subacute maxillary sinusitis  -     amoxicillin-clavulanate 875-125mg (AUGMENTIN)  875-125 mg per tablet; Take 1 tablet by mouth 2 (two) times daily. for 10 days  Dispense: 20 tablet; Refill: 0    Yeast vaginitis    Other orders  -     fluconazole (DIFLUCAN) 150 MG Tab; Take 1 tablet (150 mg total) by mouth once daily. Take 1 at onset of symptoms and repeat in 5 days if symptoms still bothersome for 2 days  Dispense: 2 tablet; Refill: 0      Pt will start with valtrex for ulcers and suspected viral URI. If not improving in a few days and sinus pain worsening then OK to begin augmentin. She will need diflucan if culture is positive and can repeat after 5 days.

## 2023-10-19 LAB — BACTERIA UR CULT: NO GROWTH

## 2023-10-20 ENCOUNTER — TELEPHONE (OUTPATIENT)
Dept: URGENT CARE | Facility: CLINIC | Age: 68
End: 2023-10-20
Payer: MEDICARE

## 2023-10-20 NOTE — TELEPHONE ENCOUNTER
Pt notified of urine culture results which was normal.unfortunately the vaginal screen for BV, yeast and trich was canceled due to improper collection tube. She is feeling better overall and no longer has vaginal discharge. She will call back if she has any problems and follow with her obgyn when she returns from her trip for her post op check

## 2023-11-08 ENCOUNTER — OFFICE VISIT (OUTPATIENT)
Dept: DERMATOLOGY | Facility: CLINIC | Age: 68
End: 2023-11-08
Payer: MEDICARE

## 2023-11-08 DIAGNOSIS — L81.4 LENTIGINES: ICD-10-CM

## 2023-11-08 DIAGNOSIS — D22.9 MULTIPLE BENIGN NEVI: ICD-10-CM

## 2023-11-08 DIAGNOSIS — L57.8 ACTINIC ELASTOSIS: Primary | ICD-10-CM

## 2023-11-08 DIAGNOSIS — L82.1 SEBORRHEIC KERATOSIS: ICD-10-CM

## 2023-11-08 DIAGNOSIS — Z86.006 HISTORY OF MELANOMA IN SITU: ICD-10-CM

## 2023-11-08 DIAGNOSIS — D18.01 ANGIOMA OF SKIN: ICD-10-CM

## 2023-11-08 DIAGNOSIS — Z12.83 SCREENING EXAM FOR SKIN CANCER: ICD-10-CM

## 2023-11-08 DIAGNOSIS — Z85.828 HISTORY OF NONMELANOMA SKIN CANCER: ICD-10-CM

## 2023-11-08 PROCEDURE — 1160F RVW MEDS BY RX/DR IN RCRD: CPT | Mod: CPTII,S$GLB,, | Performed by: DERMATOLOGY

## 2023-11-08 PROCEDURE — 1159F PR MEDICATION LIST DOCUMENTED IN MEDICAL RECORD: ICD-10-PCS | Mod: CPTII,S$GLB,, | Performed by: DERMATOLOGY

## 2023-11-08 PROCEDURE — 3288F PR FALLS RISK ASSESSMENT DOCUMENTED: ICD-10-PCS | Mod: CPTII,S$GLB,, | Performed by: DERMATOLOGY

## 2023-11-08 PROCEDURE — 1126F AMNT PAIN NOTED NONE PRSNT: CPT | Mod: CPTII,S$GLB,, | Performed by: DERMATOLOGY

## 2023-11-08 PROCEDURE — 3288F FALL RISK ASSESSMENT DOCD: CPT | Mod: CPTII,S$GLB,, | Performed by: DERMATOLOGY

## 2023-11-08 PROCEDURE — 1160F PR REVIEW ALL MEDS BY PRESCRIBER/CLIN PHARMACIST DOCUMENTED: ICD-10-PCS | Mod: CPTII,S$GLB,, | Performed by: DERMATOLOGY

## 2023-11-08 PROCEDURE — 1101F PR PT FALLS ASSESS DOC 0-1 FALLS W/OUT INJ PAST YR: ICD-10-PCS | Mod: CPTII,S$GLB,, | Performed by: DERMATOLOGY

## 2023-11-08 PROCEDURE — 1126F PR PAIN SEVERITY QUANTIFIED, NO PAIN PRESENT: ICD-10-PCS | Mod: CPTII,S$GLB,, | Performed by: DERMATOLOGY

## 2023-11-08 PROCEDURE — 99214 PR OFFICE/OUTPT VISIT, EST, LEVL IV, 30-39 MIN: ICD-10-PCS | Mod: S$GLB,,, | Performed by: DERMATOLOGY

## 2023-11-08 PROCEDURE — 99214 OFFICE O/P EST MOD 30 MIN: CPT | Mod: S$GLB,,, | Performed by: DERMATOLOGY

## 2023-11-08 PROCEDURE — 1101F PT FALLS ASSESS-DOCD LE1/YR: CPT | Mod: CPTII,S$GLB,, | Performed by: DERMATOLOGY

## 2023-11-08 PROCEDURE — 1159F MED LIST DOCD IN RCRD: CPT | Mod: CPTII,S$GLB,, | Performed by: DERMATOLOGY

## 2023-11-08 RX ORDER — TRETINOIN 0.25 MG/G
CREAM TOPICAL
Qty: 30 G | Refills: 5 | Status: SHIPPED | OUTPATIENT
Start: 2023-11-08

## 2023-11-08 NOTE — PROGRESS NOTES
"  Patient Information  Name: Sara David  : 1955  MRN: [id number]     Referring Physician:  No ref. provider found   Primary Care Physician:  Lynda Wheat MD   Date of Visit: 2023      Subjective:     History of Present lllness:    Sara David is a 68 y.o. female who presents with a chief complaint of moles and scarring from mohs.  This is a high risk patient with a personal history of MIS and nonmelanoma skin cancer who is here today to check for the development of new lesions.  Patient is here today for a "mole" check.   Today, patient has no additional complaints. Denies any new, changing, or symptomatic lesions on the skin.    Patient was last seen: 3/16/2023.  Prior notes by myself reviewed.   Clinical documentation obtained by nursing staff reviewed.    Review of Systems    Objective:   Physical Exam   Constitutional: She appears well-developed and well-nourished. No distress.   HENT:   Mouth/Throat: Lips normal.    Eyes: Lids are normal.  No conjunctival no injection.   Neurological: She is alert and oriented to person, place, and time. She is not disoriented.   Psychiatric: She has a normal mood and affect.   Skin:   Areas Examined (abnormalities noted in diagram):   Scalp / Hair Palpated and Inspected  Head / Face Inspection Performed  Neck Inspection Performed  Chest / Axilla Inspection Performed  Abdomen Inspection Performed  Genitals / Buttocks / Groin Inspection Performed  Back Inspection Performed  RUE Inspected  LUE Inspection Performed  RLE Inspected  LLE Inspection Performed  Nails and Digits Inspection Performed  Gland Inspection Performed                     Diagram Legend     Erythematous scaling macule/papule c/w actinic keratosis       Vascular papule c/w angioma      Pigmented verrucoid papule/plaque c/w seborrheic keratosis      Yellow umbilicated papule c/w sebaceous hyperplasia      Irregularly shaped tan macule c/w lentigo     1-2 mm smooth white papules " consistent with Milia      Movable subcutaneous cyst with punctum c/w epidermal inclusion cyst      Subcutaneous movable cyst c/w pilar cyst      Firm pink to brown papule c/w dermatofibroma      Pedunculated fleshy papule(s) c/w skin tag(s)      Evenly pigmented macule c/w junctional nevus     Mildly variegated pigmented, slightly irregular-bordered macule c/w mildly atypical nevus      Flesh colored to evenly pigmented papule c/w intradermal nevus       Pink pearly papule/plaque c/w basal cell carcinoma      Erythematous hyperkeratotic cursted plaque c/w SCC      Surgical scar with no sign of skin cancer recurrence      Open and closed comedones      Inflammatory papules and pustules      Verrucoid papule consistent consistent with wart     Erythematous eczematous patches and plaques     Dystrophic onycholytic nail with subungual debris c/w onychomycosis     Umbilicated papule    Erythematous-base heme-crusted tan verrucoid plaque consistent with inflamed seborrheic keratosis     Erythematous Silvery Scaling Plaque c/w Psoriasis     See annotation    No images are attached to the encounter or orders placed in the encounter.      [] Data reviewed  [] Prior external notes reviewed  [] Independent review of test  [] Management discussed with another provider  [] Independent historian    Assessment / Plan:        Actinic elastosis  - chronic problem, not at treatment goal  Actinic elastosis affects people who have had long-term sun exposure and sun damage.  Retinoids (adapalene, tretinoin, tazarotene) increase skin cell turn over from the normal 30 days to five or six days, which encourages the shedding of these sun-damaged surface skin cells  Retinoids can also repair the DNA in cells damaged by the sun, helping to even out skin pigmentation and clear pre-cancerous cells.  They also stimulate collagen remodeling and repair, reversing signs of maturing skin.   Retinoids may make the skin dry, red, and irritated.  Moisturize daily with a non-comedogenic moisturizer. If still dry, then use the retinoid every other night or every third night as tolerated. Avoid using around corners of eyes, nose, and mouth.  Everyday use a broad-spectrum, water-resistant sunscreen with SPF of 30 or higher--reapply every 2 hours. Seek shade and wear sun-protective clothing/hat.  You may see results in 3 months, but most effects are not visible until 6 months, and it must be used on a consistent basis.   *Remember that retinoids should not be used if you are pregnant.  *Discontinue use 1 week prior to waxing, as skin is more likely to tear.     -     tretinoin (RETIN-A) 0.025 % cream; Compound tretinoin 0.025% / azelaic acid 8% / niacinamide 2% cream. Apply a pea-sized amount to entire face qhs.  Dispense: 30 g; Refill: 5    Lentigines  These are benign sun spots which should be monitored for changes. Daily sun protection will reduce the number of new lesions.   Recommend using a broad-spectrum, water-resistant sunscreen with SPF of 30 or higher--reapply every 2 hours. Seek shade, wear sun-protective clothing, and perform regular skin self-exams.    Seborrheic keratosis  These are benign, inherited growths without a malignant potential. Reassurance given to patient. No treatment is necessary.    Multiple benign nevi  Multiple benign-appearing nevi present on exam today. Reassurance provided. Counseled patient to periodically examine moles and return to clinic if any changes in size, shape, or color are noted or if it becomes symptomatic (bleeding, itching, pain, etc).  Recommend using a broad-spectrum, water-resistant sunscreen with SPF of 30 or higher--reapply every 2 hours. Seek shade, wear sun-protective clothing, and perform regular skin self-exams.    Angioma of skin  These are benign vascular lesions that are inherited. Treatment is not necessary.    History of melanoma in situ   - stable and chronic  An estimated 4% to 8% of patients with  a history of melanoma develop a new primary melanoma, typically within the first 3 to 5 years following diagnosis. The risk of new primary melanoma is higher in the setting of increased nevus count, multiple clinically atypical/dysplastic nevi, family history of melanoma, fair skin/sun sensitivity, prior melanoma, and male sex.    Area of previous melanoma examined. Site well-healed with no signs of recurrence.  No lymphadenopathy palpated on exam today.  Total body skin examination performed today as noted in physical exam. Recommended continuing routine skin exams as well as routine checks with ophthalmology, dentist, and OB/GYN (if female) for any concerning lesions.  Recommend using a broad-spectrum, water-resistant sunscreen with SPF of 30 or higher--reapply every 2 hours. Seek shade, wear sun-protective clothing, and perform regular skin self-exams.    History of nonmelanoma skin cancer   - stable and chronic  Area(s) of previous nonmelanoma skin cancer evaluated with no evidence of recurrence. Reassurance provided.  Recommend using a broad-spectrum, water-resistant sunscreen with SPF of 30 or higher--reapply every 2 hours. Seek shade, wear sun-protective clothing, and perform regular skin self-exams.    Screening exam for skin cancer  Total body skin examination performed today as noted in physical exam. No lesions suspicious for malignancy were seen.  Recommend using a broad-spectrum, water-resistant sunscreen with SPF of 30 or higher--reapply every 2 hours. Seek shade, wear sun-protective clothing, and perform regular skin self-exams.             Follow up in about 6 months (around 5/8/2024) for follow up.      Mary Jo Sotomayor MD, FAAD  [facility]

## 2023-11-08 NOTE — PATIENT INSTRUCTIONS
RETINOIDS   Your Doctor has prescribed a topical retinoid for your skin.  A retinoid is a vitamin A-derived product used to treat a variety of skin conditions including acne, actinic keratoses (pre-skin cancers), uneven pigmentation from sun damage, fine lines and wrinkles, and enlarged pores.      How do they work?   Retinoids increase skin cell turn over from the normal 30 days to five or six days, minimizing clogged pores--the major factor in acne.  Retinoids can also repair the DNA in cells damaged by the sun, helping to even out skin pigmentation and clear pre-skin cancers.  They stimulate collagen remodeling and repair, reversing signs of maturing skin.   They can shrink oil glands and minimize the appearance of large pores. These effects can not be appreciated unless the medication is used on a consistent basis!    How do I use a retinoid?   After washing with a mild cleanser (CeraVe, Cetaphil, Neutrogena, Purpose), the skin should be moisturized with a non-retinol containing moisturizer such as Cerave cream or PM lotion. Then, a thin layer of medication is applied to the forehead, nose, cheeks, and chin (and around eyes if treating fine wrinkles) at night.  The amount of medication needed to cover the entire face should be no more than the size of a green pea. Irritation around the eyes can be treated with Vaseline at night.     What if my skin appears dry, red, and is peeling?   Retinoids do not cause dry skin but rather they cause the top layer of the skin to shed, giving an appearance of dry skin.  In fact, new healthy skin cells are replacing the older, damaged cells on the surface. This usually occurs the first 2-4 weeks as the skin is adjusting to the medication.  It is reasonable to use the medication every other night or even every three nights until your skin adjusts.  You can use a MILD exfoliant to remove the peeling skin (Aveeno daily clarifying pads, Aqua glycolic face cream) and can apply a  moisturizer throughout the day as needed. Retinoids come in a variety of strengths and vehicles, and your doctor can find one best for you.  If you cannot tolerate prescription strength retinoids, over the counter products with retinol may be beneficial (Olay ProX wrinkle cream, ERINN deep wrinkle cream, Green Cream at [employer])    Will my skin be more sensitive in the sun?   You will need to use a sunscreen with SPF 30 daily.  Retinoids will cause the outermost layer of the skin to be thinner and thus more sensitive to ultraviolet rays.  However, remember that over time, retinoids actually make the skin thicker by enhancing collagen deposition which protects the skin from sun damage.     When will I see results?   If you are using a retinoid for acne, you should see some improvement in 6-8 weeks.  Do not be alarmed if you find that your acne gets WORSE before it gets better- KEEP USING THE MEDICATION- this is a normal response and your acne will improve if you can stick with it.     If you are using the medication for anti-aging and skin dyspigmentation, you may see results in 3 months, but most effects are not visible until 6 months.  Retinoids are clinically proven to reverse signs of aging, but only if used on a CONSISTENT BASIS!   *Remember that retinoids should not be used if you are pregnant.  *Discontinue use 1 week prior to waxing, as skin is more likely to tear.

## 2023-11-10 ENCOUNTER — OFFICE VISIT (OUTPATIENT)
Dept: OBSTETRICS AND GYNECOLOGY | Facility: CLINIC | Age: 68
End: 2023-11-10
Payer: MEDICARE

## 2023-11-10 VITALS
SYSTOLIC BLOOD PRESSURE: 116 MMHG | HEIGHT: 71 IN | DIASTOLIC BLOOD PRESSURE: 70 MMHG | BODY MASS INDEX: 27.72 KG/M2 | WEIGHT: 198 LBS

## 2023-11-10 DIAGNOSIS — Z12.39 ENCOUNTER FOR SCREENING FOR MALIGNANT NEOPLASM OF BREAST, UNSPECIFIED SCREENING MODALITY: ICD-10-CM

## 2023-11-10 DIAGNOSIS — Z09 POSTOP CHECK: Primary | ICD-10-CM

## 2023-11-10 DIAGNOSIS — Z12.31 ENCOUNTER FOR SCREENING MAMMOGRAM FOR MALIGNANT NEOPLASM OF BREAST: ICD-10-CM

## 2023-11-10 DIAGNOSIS — Z79.890 HORMONE REPLACEMENT THERAPY: ICD-10-CM

## 2023-11-10 PROCEDURE — 3074F SYST BP LT 130 MM HG: CPT | Mod: CPTII,S$GLB,, | Performed by: OBSTETRICS & GYNECOLOGY

## 2023-11-10 PROCEDURE — 3078F PR MOST RECENT DIASTOLIC BLOOD PRESSURE < 80 MM HG: ICD-10-PCS | Mod: CPTII,S$GLB,, | Performed by: OBSTETRICS & GYNECOLOGY

## 2023-11-10 PROCEDURE — 3074F PR MOST RECENT SYSTOLIC BLOOD PRESSURE < 130 MM HG: ICD-10-PCS | Mod: CPTII,S$GLB,, | Performed by: OBSTETRICS & GYNECOLOGY

## 2023-11-10 PROCEDURE — 1159F PR MEDICATION LIST DOCUMENTED IN MEDICAL RECORD: ICD-10-PCS | Mod: CPTII,S$GLB,, | Performed by: OBSTETRICS & GYNECOLOGY

## 2023-11-10 PROCEDURE — 1101F PT FALLS ASSESS-DOCD LE1/YR: CPT | Mod: CPTII,S$GLB,, | Performed by: OBSTETRICS & GYNECOLOGY

## 2023-11-10 PROCEDURE — 99024 PR POST-OP FOLLOW-UP VISIT: ICD-10-PCS | Mod: S$GLB,,, | Performed by: OBSTETRICS & GYNECOLOGY

## 2023-11-10 PROCEDURE — 1160F RVW MEDS BY RX/DR IN RCRD: CPT | Mod: CPTII,S$GLB,, | Performed by: OBSTETRICS & GYNECOLOGY

## 2023-11-10 PROCEDURE — 1126F PR PAIN SEVERITY QUANTIFIED, NO PAIN PRESENT: ICD-10-PCS | Mod: CPTII,S$GLB,, | Performed by: OBSTETRICS & GYNECOLOGY

## 2023-11-10 PROCEDURE — 1159F MED LIST DOCD IN RCRD: CPT | Mod: CPTII,S$GLB,, | Performed by: OBSTETRICS & GYNECOLOGY

## 2023-11-10 PROCEDURE — 99999 PR PBB SHADOW E&M-EST. PATIENT-LVL III: ICD-10-PCS | Mod: PBBFAC,,, | Performed by: OBSTETRICS & GYNECOLOGY

## 2023-11-10 PROCEDURE — 99024 POSTOP FOLLOW-UP VISIT: CPT | Mod: S$GLB,,, | Performed by: OBSTETRICS & GYNECOLOGY

## 2023-11-10 PROCEDURE — 1126F AMNT PAIN NOTED NONE PRSNT: CPT | Mod: CPTII,S$GLB,, | Performed by: OBSTETRICS & GYNECOLOGY

## 2023-11-10 PROCEDURE — 3288F PR FALLS RISK ASSESSMENT DOCUMENTED: ICD-10-PCS | Mod: CPTII,S$GLB,, | Performed by: OBSTETRICS & GYNECOLOGY

## 2023-11-10 PROCEDURE — 1160F PR REVIEW ALL MEDS BY PRESCRIBER/CLIN PHARMACIST DOCUMENTED: ICD-10-PCS | Mod: CPTII,S$GLB,, | Performed by: OBSTETRICS & GYNECOLOGY

## 2023-11-10 PROCEDURE — 1101F PR PT FALLS ASSESS DOC 0-1 FALLS W/OUT INJ PAST YR: ICD-10-PCS | Mod: CPTII,S$GLB,, | Performed by: OBSTETRICS & GYNECOLOGY

## 2023-11-10 PROCEDURE — 3288F FALL RISK ASSESSMENT DOCD: CPT | Mod: CPTII,S$GLB,, | Performed by: OBSTETRICS & GYNECOLOGY

## 2023-11-10 PROCEDURE — 3078F DIAST BP <80 MM HG: CPT | Mod: CPTII,S$GLB,, | Performed by: OBSTETRICS & GYNECOLOGY

## 2023-11-10 PROCEDURE — 99999 PR PBB SHADOW E&M-EST. PATIENT-LVL III: CPT | Mod: PBBFAC,,, | Performed by: OBSTETRICS & GYNECOLOGY

## 2023-11-10 RX ORDER — MELOXICAM 15 MG/1
15 TABLET ORAL DAILY
COMMUNITY

## 2023-11-10 RX ORDER — ESTRADIOL 0.05 MG/D
1 PATCH TRANSDERMAL
Qty: 4 PATCH | Refills: 11 | Status: SHIPPED | OUTPATIENT
Start: 2023-11-10 | End: 2023-12-27

## 2023-11-10 NOTE — PROGRESS NOTES
Subjective:      Patient ID: Sara David is a 68 y.o. female.    Chief Complaint:  Post-op Evaluation      History of Present Illness  HPI  Postoperative Follow-up  Patient presents to the clinic 6 weeks status post  TLH/BSO  for  recurrent postmenopausal bleeding. Eating a regular diet without difficulty. Bowel movements are normal. The patient is not having any pain. Transitioned to estrace patch from combination patch but having some vasomotor symptoms so interested in increasing dose.    PATHOLOGY  A.  LEFT FIMBRIA, REMOVAL:   - Fragments of benign soft tissue, consistent with fallopian tube fimbria, with no pathologic abnormalities.     B.  RIGHT OVARY AND FALLOPIAN TUBE, SALPINGO-OOPHORECTOMY:   - Benign ovarian parenchyma with corpora albicantia and endosalpingiosis.   - Benign fimbriated fallopian tube parenchyma with no pathologic abnormalities.     C.  UTERUS, HYSTERECTOMY (100 GRAMS):   - Endocervix and ectocervix with Nabothian cysts and chronic cervicitis.   - Endometrial polyp in a background of endometrium showing cystic atrophy.   - Myometrium with leiomyomata and adenomyosis.     GYN & OB History  Patient's last menstrual period was 2020.   Date of Last Pap: 2023    OB History    Para Term  AB Living   2   0   2     SAB IAB Ectopic Multiple Live Births                  # Outcome Date GA Lbr Brice/2nd Weight Sex Delivery Anes PTL Lv   2 AB            1 AB                Review of Systems  Review of Systems   Constitutional:  Negative for chills, diaphoresis, fatigue and fever.   Respiratory:  Negative for cough and shortness of breath.    Cardiovascular:  Negative for chest pain and palpitations.   Gastrointestinal:  Negative for abdominal pain, constipation, diarrhea, nausea and vomiting.   Genitourinary:  Negative for dysmenorrhea, dyspareunia, dysuria, frequency, hot flashes, pelvic pain, vaginal bleeding, vaginal discharge, vaginal pain, vaginal dryness and  vaginal odor.   Neurological:  Negative for headaches.   Psychiatric/Behavioral:  Negative for depression. The patient is not nervous/anxious.           Objective:     Physical Exam:   Constitutional: She is oriented to person, place, and time. She appears well-developed and well-nourished. No distress.    HENT:   Head: Normocephalic and atraumatic.    Eyes: EOM are normal.      Pulmonary/Chest: Effort normal.          Genitourinary:    Genitourinary Comments: Normal external female genitalia. Vagina atrophic, cuff well-healed. Uterus/cervix surgically absent. No adnexal masses palpated.             Musculoskeletal: Normal range of motion and moves all extremeties.       Neurological: She is alert and oriented to person, place, and time.     Psychiatric: She has a normal mood and affect. Her behavior is normal. Judgment and thought content normal.         Assessment:     1. Postop check    2. Encounter for screening for malignant neoplasm of breast, unspecified screening modality    3. Encounter for screening mammogram for malignant neoplasm of breast    4. Hormone replacement therapy            Plan:     Sara was seen today for post-op evaluation.    Diagnoses and all orders for this visit:    Postop check  Doing well postop  Reviewed benign path  Ok to resume normal activities    Encounter for screening for malignant neoplasm of breast, unspecified screening modality  -     Mammo Digital Screening Bilat w/ [facility]; Future    Encounter for screening mammogram for malignant neoplasm of breast  -     Mammo Digital Screening Bilat w/ [facility]; Future    Hormone replacement therapy  -     estradiol 0.05 mg/24 hr td ptwk 0.05 mg/24 hr PTWK; Place 1 patch onto the skin every 7 days.        Orders Placed This Encounter   Procedures    Mammo Digital Screening Bilat w/ [facility]       Follow up in about 1 year (around 11/10/2024) for annual.

## 2023-11-21 ENCOUNTER — HOSPITAL ENCOUNTER (OUTPATIENT)
Dept: RADIOLOGY | Facility: OTHER | Age: 68
Discharge: HOME | End: 2023-11-21
Attending: OBSTETRICS & GYNECOLOGY
Payer: MEDICARE

## 2023-11-21 DIAGNOSIS — Z12.31 ENCOUNTER FOR SCREENING MAMMOGRAM FOR MALIGNANT NEOPLASM OF BREAST: ICD-10-CM

## 2023-11-21 DIAGNOSIS — Z12.39 ENCOUNTER FOR SCREENING FOR MALIGNANT NEOPLASM OF BREAST, UNSPECIFIED SCREENING MODALITY: ICD-10-CM

## 2023-11-21 PROCEDURE — 77067 MAMMO DIGITAL SCREENING BILAT WITH TOMO: ICD-10-PCS | Mod: 26,,, | Performed by: RADIOLOGY

## 2023-11-21 PROCEDURE — 77063 MAMMO DIGITAL SCREENING BILAT WITH TOMO: ICD-10-PCS | Mod: 26,,, | Performed by: RADIOLOGY

## 2023-11-21 PROCEDURE — 77063 BREAST TOMOSYNTHESIS BI: CPT | Mod: 26,,, | Performed by: RADIOLOGY

## 2023-11-21 PROCEDURE — 77067 SCR MAMMO BI INCL CAD: CPT | Mod: TC

## 2023-11-21 PROCEDURE — 77067 SCR MAMMO BI INCL CAD: CPT | Mod: 26,,, | Performed by: RADIOLOGY

## 2023-12-23 ENCOUNTER — PATIENT MESSAGE (OUTPATIENT)
Dept: OBSTETRICS AND GYNECOLOGY | Facility: CLINIC | Age: 68
End: 2023-12-23
Payer: MEDICARE

## 2023-12-23 DIAGNOSIS — Z78.0 MENOPAUSE: Primary | ICD-10-CM

## 2023-12-27 RX ORDER — ESTRADIOL 0.04 MG/D
1 PATCH TRANSDERMAL WEEKLY
Qty: 4 PATCH | Refills: 11 | Status: SHIPPED | OUTPATIENT
Start: 2023-12-27

## 2024-02-18 DIAGNOSIS — E03.9 ACQUIRED HYPOTHYROIDISM: ICD-10-CM

## 2024-02-18 NOTE — TELEPHONE ENCOUNTER
Refill Routing Note   Medication(s) are not appropriate for processing by [facility] for the following reason(s):        Responsible provider unclear-Unable to assess. PCP listed in [facility] unable to accept messages. Will defer to last known prescriber for review.     [facility] action(s):  Defer        Medication Therapy Plan: Unable to assess. PCP listed in [facility] unable to accept messages. Will defer to last known prescriber for review.      Appointments  past 12m or future 3m with PCP    Date Provider   Last Visit   1/12/2023 Cassie Pearl MD   Next Visit   Visit date not found Cassie Pearl MD   ED visits in past 90 days: 0        Note composed:8:21 AM 02/18/2024

## 2024-02-19 ENCOUNTER — TELEPHONE (OUTPATIENT)
Dept: FAMILY MEDICINE | Facility: CLINIC | Age: 69
End: 2024-02-19
Payer: MEDICARE

## 2024-02-19 RX ORDER — LEVOTHYROXINE SODIUM 88 UG/1
88 TABLET ORAL
Qty: 90 TABLET | Refills: 3 | Status: SHIPPED | OUTPATIENT
Start: 2024-02-19 | End: 2024-04-29 | Stop reason: SDUPTHER

## 2024-02-19 NOTE — TELEPHONE ENCOUNTER
----- Message from Kristaljose Ruvalcabaling sent at 2/19/2024 10:28 AM CST -----  Regarding: Schedule appt  Type: Patient Call Back    Who called: p/t     What is the request in detail: p/t states that Rosaura told her personally that she will take her on as a new p/t. She states rosaura fostered her dog for her. Please call to schedule.     Can the clinic reply by [facility]? Yes     Would the patient rather a call back or a response via [facility]?     Best call back number:  [phone]    Additional Information:

## 2024-03-18 DIAGNOSIS — G47.9 SLEEP DIFFICULTIES: ICD-10-CM

## 2024-03-18 RX ORDER — TRAZODONE HYDROCHLORIDE 50 MG/1
TABLET ORAL
Qty: 90 TABLET | Refills: 3 | Status: CANCELLED | OUTPATIENT
Start: 2024-03-18

## 2024-04-03 ENCOUNTER — OFFICE VISIT (OUTPATIENT)
Dept: DERMATOLOGY | Facility: CLINIC | Age: 69
End: 2024-04-03
Payer: MEDICARE

## 2024-04-03 DIAGNOSIS — Z85.828 HISTORY OF NONMELANOMA SKIN CANCER: ICD-10-CM

## 2024-04-03 DIAGNOSIS — Z86.006 HISTORY OF MELANOMA IN SITU: ICD-10-CM

## 2024-04-03 DIAGNOSIS — L72.0 EIC (EPIDERMAL INCLUSION CYST): Primary | ICD-10-CM

## 2024-04-03 DIAGNOSIS — L24.9 IRRITANT CONTACT DERMATITIS, UNSPECIFIED TRIGGER: ICD-10-CM

## 2024-04-03 PROCEDURE — 99213 OFFICE O/P EST LOW 20 MIN: CPT | Mod: S$GLB,,, | Performed by: DERMATOLOGY

## 2024-04-03 NOTE — PROGRESS NOTES
Patient Information  Name: Sara David  : 1955  MRN: [id number]     Referring Physician:  No ref. provider found   Primary Care Physician:  Elizabeth, Primary Doctor   Date of Visit: 2024      Subjective:     History of Present lllness:    Sara David is a 68 y.o. female who presents with a chief complaint of rash on chest and black spot on back.    Location: back  Duration: unsure  Signs/Symptoms: black spot  Relieving factors/Prior treatments: none    Location: R upper chest  Duration: 3 weeks  Signs/Symptoms: red  Relieving factors/Prior treatments: neosporin and OTC lotions    Also would like to look at nose where BCC was, feels like scar is changing/getting larger     Patient was last seen: 2023.  Prior notes by myself reviewed.   Clinical documentation obtained by nursing staff reviewed.    Review of Systems    Objective:   Physical Exam   Constitutional: She appears well-developed and well-nourished. No distress.   Neurological: She is alert and oriented to person, place, and time. She is not disoriented.   Psychiatric: She has a normal mood and affect.   Skin:   Areas Examined (abnormalities noted in diagram):   Head / Face Inspection Performed  Chest / Axilla Inspection Performed  Back Inspection Performed                 Diagram Legend     Erythematous scaling macule/papule c/w actinic keratosis       Vascular papule c/w angioma      Pigmented verrucoid papule/plaque c/w seborrheic keratosis      Yellow umbilicated papule c/w sebaceous hyperplasia      Irregularly shaped tan macule c/w lentigo     1-2 mm smooth white papules consistent with Milia      Movable subcutaneous cyst with punctum c/w epidermal inclusion cyst      Subcutaneous movable cyst c/w pilar cyst      Firm pink to brown papule c/w dermatofibroma      Pedunculated fleshy papule(s) c/w skin tag(s)      Evenly pigmented macule c/w junctional nevus     Mildly variegated pigmented, slightly irregular-bordered macule  c/w mildly atypical nevus      Flesh colored to evenly pigmented papule c/w intradermal nevus       Pink pearly papule/plaque c/w basal cell carcinoma      Erythematous hyperkeratotic cursted plaque c/w SCC      Surgical scar with no sign of skin cancer recurrence      Open and closed comedones      Inflammatory papules and pustules      Verrucoid papule consistent consistent with wart     Erythematous eczematous patches and plaques     Dystrophic onycholytic nail with subungual debris c/w onychomycosis     Umbilicated papule    Erythematous-base heme-crusted tan verrucoid plaque consistent with inflamed seborrheic keratosis     Erythematous Silvery Scaling Plaque c/w Psoriasis     See annotation    No images are attached to the encounter or orders placed in the encounter.      [] Data reviewed  [] Prior external notes reviewed  [] Independent review of test  [] Management discussed with another provider  [] Independent historian    Assessment / Plan:        EIC (epidermal inclusion cyst)  This is a benign cyst of the hair follicle. Reassurance provided. No treatment is necessary unless it is symptomatic.     Irritant contact dermatitis, unspecified trigger  Appears to be resolving. Can use OTC hydrocortisone.    History of melanoma in situ (left arm, 04/2018)  History of nonmelanoma skin cancer   - stable and chronic  Area(s) of previous MIS and nonmelanoma skin cancers evaluated with no evidence of recurrence. Reassurance provided.  Recommend using a broad-spectrum, water-resistant sunscreen with SPF of 30 or higher--reapply every 2 hours. Seek shade, wear sun-protective clothing, and perform regular skin self-exams.      Follow up in about 7 months (around 11/3/2024) for TBSE, or sooner if any new problems or changing lesions.      Mary Jo Sotomayor MD, FAAD  [facility]

## 2024-04-08 ENCOUNTER — OFFICE VISIT (OUTPATIENT)
Dept: URGENT CARE | Facility: CLINIC | Age: 69
End: 2024-04-08
Payer: MEDICARE

## 2024-04-08 VITALS
TEMPERATURE: 98 F | DIASTOLIC BLOOD PRESSURE: 85 MMHG | HEART RATE: 75 BPM | HEIGHT: 71 IN | WEIGHT: 197 LBS | RESPIRATION RATE: 19 BRPM | OXYGEN SATURATION: 98 % | BODY MASS INDEX: 27.58 KG/M2 | SYSTOLIC BLOOD PRESSURE: 123 MMHG

## 2024-04-08 DIAGNOSIS — G47.9 SLEEP DIFFICULTIES: ICD-10-CM

## 2024-04-08 PROCEDURE — 99213 OFFICE O/P EST LOW 20 MIN: CPT | Mod: S$GLB,,, | Performed by: FAMILY MEDICINE

## 2024-04-08 RX ORDER — TRAZODONE HYDROCHLORIDE 50 MG/1
TABLET ORAL
Qty: 90 TABLET | Refills: 0 | Status: SHIPPED | OUTPATIENT
Start: 2024-04-08 | End: 2024-04-29 | Stop reason: SDUPTHER

## 2024-04-08 NOTE — PROGRESS NOTES
"Subjective:      Patient ID: Sara David is a 68 y.o. female.    Vitals:  height is 5' 11" (1.803 m) and weight is 89.4 kg (197 lb). Her oral temperature is 97.9 °F (36.6 °C). Her blood pressure is 123/85 and her pulse is 75. Her respiration is 19 and oxygen saturation is 98%.     Chief Complaint: Medication Refill    Patient is in between doctors she is unable to get into new doctor until the end of the month. Patient needs trazodone refilled    Medication Refill  This is a new problem. The treatment provided no relief.     ROS   Objective:     Physical Exam   Constitutional: She is oriented to person, place, and time.   Abdominal: Normal appearance.   Neurological: no focal deficit. She is alert and oriented to person, place, and time.   Skin: Skin is warm and dry.   Psychiatric: Her behavior is normal. Judgment and thought content normal.   Nursing note and vitals reviewed.      Assessment:     1. Sleep difficulties        Plan:     Pt well known to me. Stable on med for years. Can't sleep without the med  Sleep difficulties  -     traZODone (DESYREL) 50 MG tablet; TAKE 1 TABLET(50 MG) BY MOUTH NIGHTLY AS NEEDED FOR INSOMNIA  Dispense: 90 tablet; Refill: 0    Follow with pcp                "

## 2024-04-10 ENCOUNTER — PATIENT OUTREACH (OUTPATIENT)
Dept: ADMINISTRATIVE | Facility: HOSPITAL | Age: 69
End: 2024-04-10
Payer: MEDICARE

## 2024-04-24 ENCOUNTER — LAB VISIT (OUTPATIENT)
Dept: LAB | Facility: HOSPITAL | Age: 69
End: 2024-04-24
Attending: FAMILY MEDICINE
Payer: MEDICARE

## 2024-04-24 ENCOUNTER — OFFICE VISIT (OUTPATIENT)
Dept: FAMILY MEDICINE | Facility: CLINIC | Age: 69
End: 2024-04-24
Attending: FAMILY MEDICINE
Payer: MEDICARE

## 2024-04-24 VITALS
HEART RATE: 83 BPM | OXYGEN SATURATION: 97 % | SYSTOLIC BLOOD PRESSURE: 125 MMHG | WEIGHT: 205 LBS | DIASTOLIC BLOOD PRESSURE: 74 MMHG | BODY MASS INDEX: 28.59 KG/M2

## 2024-04-24 DIAGNOSIS — Z00.00 ANNUAL PHYSICAL EXAM: ICD-10-CM

## 2024-04-24 DIAGNOSIS — E78.2 MIXED HYPERLIPIDEMIA: ICD-10-CM

## 2024-04-24 DIAGNOSIS — Z00.00 ANNUAL PHYSICAL EXAM: Primary | ICD-10-CM

## 2024-04-24 DIAGNOSIS — R79.9 ABNORMAL FINDING OF BLOOD CHEMISTRY, UNSPECIFIED: ICD-10-CM

## 2024-04-24 DIAGNOSIS — N18.31 STAGE 3A CHRONIC KIDNEY DISEASE: ICD-10-CM

## 2024-04-24 LAB
ALBUMIN SERPL BCP-MCNC: 4.2 G/DL (ref 3.5–5.2)
ALP SERPL-CCNC: 69 U/L (ref 55–135)
ALT SERPL W/O P-5'-P-CCNC: 40 U/L (ref 10–44)
ANION GAP SERPL CALC-SCNC: 11 MMOL/L (ref 8–16)
AST SERPL-CCNC: 29 U/L (ref 10–40)
BACTERIA #/AREA URNS AUTO: ABNORMAL /HPF
BASOPHILS # BLD AUTO: 0.04 K/UL (ref 0–0.2)
BASOPHILS NFR BLD: 0.5 % (ref 0–1.9)
BILIRUB SERPL-MCNC: 0.3 MG/DL (ref 0.1–1)
BILIRUB UR QL STRIP: NEGATIVE
BUN SERPL-MCNC: 16 MG/DL (ref 8–23)
CALCIUM SERPL-MCNC: 10 MG/DL (ref 8.7–10.5)
CHLORIDE SERPL-SCNC: 104 MMOL/L (ref 95–110)
CLARITY UR REFRACT.AUTO: CLEAR
CO2 SERPL-SCNC: 25 MMOL/L (ref 23–29)
COLOR UR AUTO: YELLOW
CREAT SERPL-MCNC: 0.9 MG/DL (ref 0.5–1.4)
CREAT UR-MCNC: 134 MG/DL (ref 15–325)
DIFFERENTIAL METHOD BLD: ABNORMAL
EOSINOPHIL # BLD AUTO: 0.2 K/UL (ref 0–0.5)
EOSINOPHIL NFR BLD: 2.5 % (ref 0–8)
ERYTHROCYTE [DISTWIDTH] IN BLOOD BY AUTOMATED COUNT: 13.4 % (ref 11.5–14.5)
EST. GFR  (NO RACE VARIABLE): >60 ML/MIN/1.73 M^2
ESTIMATED AVG GLUCOSE: 97 MG/DL (ref 68–131)
GLUCOSE SERPL-MCNC: 84 MG/DL (ref 70–110)
GLUCOSE UR QL STRIP: NEGATIVE
HBA1C MFR BLD: 5 % (ref 4–5.6)
HCT VFR BLD AUTO: 45.1 % (ref 37–48.5)
HGB BLD-MCNC: 15 G/DL (ref 12–16)
HGB UR QL STRIP: NEGATIVE
IMM GRANULOCYTES # BLD AUTO: 0.04 K/UL (ref 0–0.04)
IMM GRANULOCYTES NFR BLD AUTO: 0.5 % (ref 0–0.5)
KETONES UR QL STRIP: NEGATIVE
LEUKOCYTE ESTERASE UR QL STRIP: ABNORMAL
LYMPHOCYTES # BLD AUTO: 2.2 K/UL (ref 1–4.8)
LYMPHOCYTES NFR BLD: 25.8 % (ref 18–48)
MCH RBC QN AUTO: 32.2 PG (ref 27–31)
MCHC RBC AUTO-ENTMCNC: 33.3 G/DL (ref 32–36)
MCV RBC AUTO: 97 FL (ref 82–98)
MICROSCOPIC COMMENT: ABNORMAL
MONOCYTES # BLD AUTO: 0.7 K/UL (ref 0.3–1)
MONOCYTES NFR BLD: 8.1 % (ref 4–15)
NEUTROPHILS # BLD AUTO: 5.2 K/UL (ref 1.8–7.7)
NEUTROPHILS NFR BLD: 62.6 % (ref 38–73)
NITRITE UR QL STRIP: NEGATIVE
NRBC BLD-RTO: 0 /100 WBC
PH UR STRIP: 6 [PH] (ref 5–8)
PLATELET # BLD AUTO: 323 K/UL (ref 150–450)
PMV BLD AUTO: 9.5 FL (ref 9.2–12.9)
POTASSIUM SERPL-SCNC: 4.6 MMOL/L (ref 3.5–5.1)
PROT SERPL-MCNC: 7.3 G/DL (ref 6–8.4)
PROT UR QL STRIP: NEGATIVE
PROT UR-MCNC: <7 MG/DL (ref 0–15)
PROT/CREAT UR: NORMAL MG/G{CREAT} (ref 0–0.2)
RBC # BLD AUTO: 4.66 M/UL (ref 4–5.4)
RBC #/AREA URNS AUTO: 2 /HPF (ref 0–4)
SODIUM SERPL-SCNC: 140 MMOL/L (ref 136–145)
SP GR UR STRIP: 1.02 (ref 1–1.03)
SQUAMOUS #/AREA URNS AUTO: 1 /HPF
TSH SERPL DL<=0.005 MIU/L-ACNC: 0.61 UIU/ML (ref 0.4–4)
URN SPEC COLLECT METH UR: ABNORMAL
WBC # BLD AUTO: 8.32 K/UL (ref 3.9–12.7)
WBC #/AREA URNS AUTO: 13 /HPF (ref 0–5)

## 2024-04-24 PROCEDURE — 1125F AMNT PAIN NOTED PAIN PRSNT: CPT | Mod: CPTII,S$GLB,, | Performed by: FAMILY MEDICINE

## 2024-04-24 PROCEDURE — 3288F FALL RISK ASSESSMENT DOCD: CPT | Mod: CPTII,S$GLB,, | Performed by: FAMILY MEDICINE

## 2024-04-24 PROCEDURE — 99397 PER PM REEVAL EST PAT 65+ YR: CPT | Mod: S$GLB,,, | Performed by: FAMILY MEDICINE

## 2024-04-24 PROCEDURE — 3078F DIAST BP <80 MM HG: CPT | Mod: CPTII,S$GLB,, | Performed by: FAMILY MEDICINE

## 2024-04-24 PROCEDURE — 3074F SYST BP LT 130 MM HG: CPT | Mod: CPTII,S$GLB,, | Performed by: FAMILY MEDICINE

## 2024-04-24 PROCEDURE — 3044F HG A1C LEVEL LT 7.0%: CPT | Mod: CPTII,S$GLB,, | Performed by: FAMILY MEDICINE

## 2024-04-24 PROCEDURE — 80053 COMPREHEN METABOLIC PANEL: CPT | Performed by: FAMILY MEDICINE

## 2024-04-24 PROCEDURE — 84156 ASSAY OF PROTEIN URINE: CPT | Performed by: FAMILY MEDICINE

## 2024-04-24 PROCEDURE — 85025 COMPLETE CBC W/AUTO DIFF WBC: CPT | Performed by: FAMILY MEDICINE

## 2024-04-24 PROCEDURE — 3008F BODY MASS INDEX DOCD: CPT | Mod: CPTII,S$GLB,, | Performed by: FAMILY MEDICINE

## 2024-04-24 PROCEDURE — 81001 URINALYSIS AUTO W/SCOPE: CPT | Performed by: FAMILY MEDICINE

## 2024-04-24 PROCEDURE — 83036 HEMOGLOBIN GLYCOSYLATED A1C: CPT | Performed by: FAMILY MEDICINE

## 2024-04-24 PROCEDURE — 99999 PR PBB SHADOW E&M-EST. PATIENT-LVL III: CPT | Mod: PBBFAC,,, | Performed by: FAMILY MEDICINE

## 2024-04-24 PROCEDURE — 36415 COLL VENOUS BLD VENIPUNCTURE: CPT | Mod: PO | Performed by: FAMILY MEDICINE

## 2024-04-24 PROCEDURE — 1101F PT FALLS ASSESS-DOCD LE1/YR: CPT | Mod: CPTII,S$GLB,, | Performed by: FAMILY MEDICINE

## 2024-04-24 PROCEDURE — 84443 ASSAY THYROID STIM HORMONE: CPT | Performed by: FAMILY MEDICINE

## 2024-04-25 ENCOUNTER — OFFICE VISIT (OUTPATIENT)
Dept: FAMILY MEDICINE | Facility: CLINIC | Age: 69
End: 2024-04-25
Payer: MEDICARE

## 2024-04-25 ENCOUNTER — TELEPHONE (OUTPATIENT)
Dept: FAMILY MEDICINE | Facility: CLINIC | Age: 69
End: 2024-04-25
Payer: MEDICARE

## 2024-04-25 DIAGNOSIS — R82.90 ABNORMAL URINALYSIS: Primary | ICD-10-CM

## 2024-04-25 DIAGNOSIS — Z00.00 ENCOUNTER FOR MEDICARE ANNUAL WELLNESS EXAM: ICD-10-CM

## 2024-04-25 PROCEDURE — 1159F MED LIST DOCD IN RCRD: CPT | Mod: CPTII,95,, | Performed by: NURSE PRACTITIONER

## 2024-04-25 PROCEDURE — 3044F HG A1C LEVEL LT 7.0%: CPT | Mod: CPTII,95,, | Performed by: NURSE PRACTITIONER

## 2024-04-25 PROCEDURE — 99213 OFFICE O/P EST LOW 20 MIN: CPT | Mod: 95,,, | Performed by: NURSE PRACTITIONER

## 2024-04-25 PROCEDURE — 1160F RVW MEDS BY RX/DR IN RCRD: CPT | Mod: CPTII,95,, | Performed by: NURSE PRACTITIONER

## 2024-04-25 RX ORDER — NITROFURANTOIN 25; 75 MG/1; MG/1
100 CAPSULE ORAL 2 TIMES DAILY
Qty: 10 CAPSULE | Refills: 0 | Status: SHIPPED | OUTPATIENT
Start: 2024-04-25 | End: 2024-04-30

## 2024-04-25 NOTE — PROGRESS NOTES
The patient location is: [place], [place]  The chief complaint leading to consultation is: abnormal urinalysis; dysuria    Visit type: audiovisual    Face to Face time with patient: 15 minutes of total time spent on the encounter, which includes face to face time and non-face to face time preparing to see the patient (eg, review of tests), Obtaining and/or reviewing separately obtained history, Documenting clinical information in the electronic or other health record, Independently interpreting results (not separately reported) and communicating results to the patient/family/caregiver, or Care coordination (not separately reported).     Each patient to whom he or she provides medical services by telemedicine is:  (1) informed of the relationship between the physician and patient and the respective role of any other health care provider with respect to management of the patient; and (2) notified that he or she may decline to receive medical services by telemedicine and may withdraw from such care at any time.    Notes:     Subjective:       Patient ID: Sara David is a 68 y.o. female.    Chief Complaint: Dysuria    Dysuria   This is a recurrent problem. The current episode started in the past 7 days. The problem occurs intermittently. The problem has been waxing and waning. The quality of the pain is described as aching. The pain is mild. There has been no fever. She is Sexually active. There is No history of pyelonephritis. Associated symptoms include a discharge and urgency. Pertinent negatives include no behavior changes, chills, flank pain, frequency, hematuria, hesitancy, nausea, sweats, vomiting, weight loss, constipation, rash or withholding. She has tried nothing and increased fluids for the symptoms. Her past medical history is significant for catheterization and recurrent UTIs. There is no history of diabetes mellitus, genitourinary reflux, hypertension, kidney stones, a single kidney, STD, urinary  stasis or a urological procedure.     Patient Active Problem List   Diagnosis    S/P D&C (status post dilation and curettage)    Hot flashes due to menopause    Hypothyroidism    History of melanoma    Family history of osteoporosis    Environmental allergies    Sleep difficulties    Neck pain on right side    Stage 3 chronic kidney disease    History of melanoma in situ    Hemorrhoids    Bilateral low back pain without sciatica    Bilateral hip pain    Low vitamin D level    Insomnia    S/P total laparoscopic hysterectomy/RSO/L fimbria removal/cystoscopy       Current Outpatient Medications:     acetaminophen (TYLENOL) 650 MG TbSR, Take 1 tablet (650 mg total) by mouth every 6 (six) hours., Disp: 30 tablet, Rfl: 1    cetirizine (ZYRTEC) 10 MG tablet, Take 10 mg by mouth once daily., Disp: , Rfl:     ergocalciferol, vitamin D2, (VITAMIN D ORAL), Take by mouth. 1000 mcg PO DAILY, Disp: , Rfl:     estradioL (CLIMARA) 0.0375 mg/24 hr, Place 1 patch onto the skin once a week., Disp: 4 patch, Rfl: 11    levothyroxine (SYNTHROID) 88 MCG tablet, TAKE 1 TABLET(88 MCG) BY MOUTH BEFORE BREAKFAST, Disp: 90 tablet, Rfl: 3    meloxicam (MOBIC) 15 MG tablet, Take 15 mg by mouth once daily. (Patient not taking: Reported on 4/24/2024), Disp: , Rfl:     nitrofurantoin, macrocrystal-monohydrate, (MACROBID) 100 MG capsule, Take 1 capsule (100 mg total) by mouth 2 (two) times daily. for 5 days, Disp: 10 capsule, Rfl: 0    traZODone (DESYREL) 50 MG tablet, TAKE 1 TABLET(50 MG) BY MOUTH NIGHTLY AS NEEDED FOR INSOMNIA, Disp: 90 tablet, Rfl: 0    tretinoin (RETIN-A) 0.025 % cream, Compound tretinoin 0.025% / azelaic acid 8% / niacinamide 2% cream. Apply a pea-sized amount to entire face qhs., Disp: 30 g, Rfl: 5    valACYclovir (VALTREX) 1000 MG tablet, Take 1 tablet (1,000 mg total) by mouth every 12 (twelve) hours. for 5 days, Disp: 10 tablet, Rfl: 0    The following portions of the patient's history were reviewed and updated as  "appropriate: allergies, past family history, past medical history, past social history and past surgical history.    Review of Systems   Constitutional:  Negative for appetite change, chills, fatigue, fever, unexpected weight change and weight loss.   HENT:  Negative for hearing loss, rhinorrhea, sneezing, sore throat and trouble swallowing.    Eyes:  Negative for visual disturbance.   Respiratory:  Negative for cough, shortness of breath and wheezing.    Cardiovascular:  Negative for chest pain and palpitations.   Gastrointestinal:  Negative for abdominal pain, constipation, diarrhea, nausea and vomiting.   Genitourinary:  Positive for dysuria and urgency. Negative for difficulty urinating, flank pain, frequency, hematuria and hesitancy.   Musculoskeletal:  Negative for arthralgias and myalgias.   Skin:  Negative for rash.   Neurological:  Negative for dizziness, weakness and numbness.   Psychiatric/Behavioral:  Negative for sleep disturbance. The patient is not nervous/anxious.        Objective:      LMP 01/17/2020     Physical Exam  Constitutional:       General: She is not in acute distress.     Appearance: Normal appearance.   Pulmonary:      Effort: Pulmonary effort is normal.   Musculoskeletal:         General: Normal range of motion.   Neurological:      Mental Status: She is alert and oriented to person, place, and time.   Psychiatric:         Mood and Affect: Mood normal.         Behavior: Behavior normal.         Assessment:       1. Abnormal urinalysis        Plan:   Sara Romano" was seen today for dysuria.    Diagnoses and all orders for this visit:    Abnormal urinalysis  -     CULTURE, URINE; Future  -     nitrofurantoin, macrocrystal-monohydrate, (MACROBID) 100 MG capsule; Take 1 capsule (100 mg total) by mouth 2 (two) times daily. for 5 days        "

## 2024-04-26 ENCOUNTER — HOSPITAL ENCOUNTER (OUTPATIENT)
Dept: CARDIOLOGY | Facility: OTHER | Age: 69
Discharge: HOME | End: 2024-04-26
Attending: FAMILY MEDICINE
Payer: MEDICARE

## 2024-04-26 ENCOUNTER — HOSPITAL ENCOUNTER (OUTPATIENT)
Dept: RADIOLOGY | Facility: OTHER | Age: 69
Discharge: HOME | End: 2024-04-26
Attending: FAMILY MEDICINE
Payer: MEDICARE

## 2024-04-26 DIAGNOSIS — Z00.00 ANNUAL PHYSICAL EXAM: ICD-10-CM

## 2024-04-26 DIAGNOSIS — E78.2 MIXED HYPERLIPIDEMIA: ICD-10-CM

## 2024-04-26 LAB
OHS QRS DURATION: 90 MS
OHS QTC CALCULATION: 420 MS

## 2024-04-26 PROCEDURE — 93005 ELECTROCARDIOGRAM TRACING: CPT

## 2024-04-26 PROCEDURE — 71046 X-RAY EXAM CHEST 2 VIEWS: CPT | Mod: TC,FY

## 2024-04-26 PROCEDURE — 93010 ELECTROCARDIOGRAM REPORT: CPT | Mod: ,,, | Performed by: INTERNAL MEDICINE

## 2024-04-26 PROCEDURE — 71046 X-RAY EXAM CHEST 2 VIEWS: CPT | Mod: 26,,, | Performed by: RADIOLOGY

## 2024-04-29 DIAGNOSIS — G47.9 SLEEP DIFFICULTIES: ICD-10-CM

## 2024-04-29 DIAGNOSIS — E03.9 ACQUIRED HYPOTHYROIDISM: ICD-10-CM

## 2024-04-29 PROBLEM — N18.30 STAGE 3 CHRONIC KIDNEY DISEASE: Status: RESOLVED | Noted: 2019-05-21 | Resolved: 2024-04-29

## 2024-04-29 NOTE — TELEPHONE ENCOUNTER
----- Message from Ramona  sent at 4/29/2024  2:13 PM CDT -----  Regarding: Refill Request  Who Called: DANIELLE JACK [[id number]]          New Prescription or Refill : Refill      RX Name and Strength:  meloxicam (MOBIC) 15 MG tablet          RX Name and Strength:  traZODone (DESYREL) 50 MG tablet          RX Name and Strength:  levothyroxine (SYNTHROID) 88 MCG tablet      30 day or 90 day RX: 90      Preferred Pharmacy: [id number] [place] [address] [employer] [facility] [employer]  & [facility]   Phone: [phone]  Fax: [phone]            Would the patient rather a call back or a response via [facility]? Call back        Best Call Back Number:   [phone]        Additional Information:

## 2024-04-29 NOTE — TELEPHONE ENCOUNTER
No care due was identified.  [facility] Embedded Care Due Messages. Reference number: [id number].   4/29/2024 2:59:13 PM CDT

## 2024-04-29 NOTE — PROGRESS NOTES
Subjective:       Patient ID: Sara David is a 68 y.o. female.    Chief Complaint: Establish Care    HPI  Pt is here for annual exam pt is well no sob/cp no cough chest congestion no sore throat uri symptoms  Pt denies dysuria hematuria no vaginal bleeding  Pt denies n/v/f/c/d/c no change in bowel habits no brbpr  Pt has hypercholesterolemia not on statin not on low fat diet consistently   Pt had isolated low gfr improved with subsequent blood work   Review of Systems   Constitutional:  Negative for activity change, chills, diaphoresis, fatigue and fever.   HENT:  Negative for congestion, ear discharge, ear pain, hearing loss, postnasal drip, rhinorrhea, sinus pressure, sneezing, sore throat, trouble swallowing and voice change.    Eyes:  Negative for photophobia, discharge, redness, itching and visual disturbance.   Respiratory:  Negative for cough, chest tightness, shortness of breath and wheezing.    Cardiovascular:  Negative for chest pain, palpitations and leg swelling.   Gastrointestinal:  Negative for abdominal pain, anal bleeding, blood in stool, constipation, diarrhea, nausea, rectal pain and vomiting.   Genitourinary:  Negative for dyspareunia, dysuria, flank pain, frequency, hematuria, menstrual problem, pelvic pain, urgency, vaginal bleeding and vaginal discharge.   Musculoskeletal:  Negative for arthralgias, back pain, joint swelling and neck pain.   Skin:  Negative for color change and rash.   Neurological:  Negative for dizziness, speech difficulty, weakness, light-headedness, numbness and headaches.   Hematological:  Does not bruise/bleed easily.   Psychiatric/Behavioral:  Negative for agitation, confusion, decreased concentration, sleep disturbance and suicidal ideas. The patient is not nervous/anxious.        Objective:    /74   Pulse 83   Wt 93 kg (205 lb)   LMP 01/17/2020   SpO2 97%   BMI 28.59 kg/m²     Physical Exam  Constitutional:       Appearance: Normal appearance. She  is well-developed. She is not ill-appearing.   HENT:      Head: Normocephalic and atraumatic.      Right Ear: External ear normal.      Left Ear: External ear normal.      Nose: Nose normal.   Eyes:      General:         Right eye: No discharge.         Left eye: No discharge.      Conjunctiva/sclera: Conjunctivae normal.      Pupils: Pupils are equal, round, and reactive to light.   Neck:      Thyroid: No thyromegaly.   Cardiovascular:      Rate and Rhythm: Normal rate and regular rhythm.      Heart sounds: Normal heart sounds. No murmur heard.     No friction rub. No gallop.   Pulmonary:      Effort: Pulmonary effort is normal.      Breath sounds: Normal breath sounds. No wheezing or rales.   Abdominal:      General: Bowel sounds are normal. There is no distension.      Palpations: Abdomen is soft.      Tenderness: There is no abdominal tenderness. There is no guarding or rebound.   Genitourinary:     Vagina: Normal.   Musculoskeletal:         General: No tenderness. Normal range of motion.      Cervical back: Normal range of motion and neck supple.   Lymphadenopathy:      Cervical: No cervical adenopathy.   Skin:     General: Skin is warm and dry.      Findings: No erythema or rash.   Neurological:      General: No focal deficit present.      Mental Status: She is alert and oriented to person, place, and time.      Cranial Nerves: No cranial nerve deficit.      Motor: No abnormal muscle tone.      Coordination: Coordination normal.   Psychiatric:         Mood and Affect: Mood normal.         Behavior: Behavior normal.         Thought Content: Thought content normal.         Judgment: Judgment normal.         Assessment:       1. Annual physical exam    2. Mixed hyperlipidemia    3. Abnormal finding of blood chemistry, unspecified    4. Stage 3a chronic kidney disease        Plan:     Orders cmp cbc li;id tsh urine hgb A1c  Cont meds  Increase water intake  Low fat diet  Graded exercise  Rtc 6 months    Health  "maintenance  Discussed with pt        "This note will not be shared with the patient."     "

## 2024-04-30 ENCOUNTER — PATIENT MESSAGE (OUTPATIENT)
Dept: FAMILY MEDICINE | Facility: CLINIC | Age: 69
End: 2024-04-30
Payer: MEDICARE

## 2024-04-30 DIAGNOSIS — G47.9 SLEEP DIFFICULTIES: ICD-10-CM

## 2024-04-30 DIAGNOSIS — E03.9 ACQUIRED HYPOTHYROIDISM: ICD-10-CM

## 2024-04-30 DIAGNOSIS — Z00.00 ENCOUNTER FOR MEDICARE ANNUAL WELLNESS EXAM: ICD-10-CM

## 2024-04-30 RX ORDER — LEVOTHYROXINE SODIUM 88 UG/1
88 TABLET ORAL
Qty: 90 TABLET | Refills: 3 | Status: SHIPPED | OUTPATIENT
Start: 2024-04-30 | End: 2024-04-30 | Stop reason: SDUPTHER

## 2024-04-30 RX ORDER — TRAZODONE HYDROCHLORIDE 50 MG/1
TABLET ORAL
Qty: 90 TABLET | Refills: 0 | Status: SHIPPED | OUTPATIENT
Start: 2024-04-30 | End: 2024-04-30 | Stop reason: SDUPTHER

## 2024-04-30 RX ORDER — MELOXICAM 15 MG/1
15 TABLET ORAL DAILY
Qty: 90 TABLET | Refills: 0 | Status: SHIPPED | OUTPATIENT
Start: 2024-04-30 | End: 2024-04-30 | Stop reason: SDUPTHER

## 2024-04-30 RX ORDER — MELOXICAM 15 MG/1
15 TABLET ORAL DAILY
Qty: 90 TABLET | Refills: 0 | Status: SHIPPED | OUTPATIENT
Start: 2024-04-30

## 2024-04-30 RX ORDER — TRAZODONE HYDROCHLORIDE 50 MG/1
TABLET ORAL
Qty: 90 TABLET | Refills: 0 | Status: SHIPPED | OUTPATIENT
Start: 2024-04-30

## 2024-04-30 RX ORDER — LEVOTHYROXINE SODIUM 88 UG/1
88 TABLET ORAL
Qty: 90 TABLET | Refills: 3 | Status: SHIPPED | OUTPATIENT
Start: 2024-04-30

## 2024-05-01 ENCOUNTER — PATIENT OUTREACH (OUTPATIENT)
Dept: ADMINISTRATIVE | Facility: HOSPITAL | Age: 69
End: 2024-05-01
Payer: MEDICARE

## 2024-05-01 DIAGNOSIS — R94.31 ABNORMAL EKG: Primary | ICD-10-CM

## 2024-05-02 ENCOUNTER — TELEPHONE (OUTPATIENT)
Dept: ADMINISTRATIVE | Facility: OTHER | Age: 69
End: 2024-05-02
Payer: MEDICARE

## 2024-05-02 ENCOUNTER — PATIENT MESSAGE (OUTPATIENT)
Dept: ADMINISTRATIVE | Facility: OTHER | Age: 69
End: 2024-05-02
Payer: MEDICARE

## 2024-05-02 NOTE — TELEPHONE ENCOUNTER
MICHAEL with scheduled Cardiology appointment of 6-, and contact number provided for any questions. My Chart message to be sent.

## 2024-05-13 ENCOUNTER — LAB VISIT (OUTPATIENT)
Dept: LAB | Facility: HOSPITAL | Age: 69
End: 2024-05-13
Attending: NURSE PRACTITIONER
Payer: MEDICARE

## 2024-05-13 DIAGNOSIS — R82.90 ABNORMAL URINALYSIS: ICD-10-CM

## 2024-05-13 PROCEDURE — 87086 URINE CULTURE/COLONY COUNT: CPT | Performed by: NURSE PRACTITIONER

## 2024-05-14 LAB — BACTERIA UR CULT: NO GROWTH

## 2024-05-24 ENCOUNTER — OFFICE VISIT (OUTPATIENT)
Dept: URGENT CARE | Facility: CLINIC | Age: 69
End: 2024-05-24
Payer: MEDICARE

## 2024-05-24 VITALS
BODY MASS INDEX: 28.7 KG/M2 | TEMPERATURE: 99 F | WEIGHT: 205 LBS | DIASTOLIC BLOOD PRESSURE: 87 MMHG | HEIGHT: 71 IN | OXYGEN SATURATION: 98 % | RESPIRATION RATE: 20 BRPM | SYSTOLIC BLOOD PRESSURE: 144 MMHG | HEART RATE: 74 BPM

## 2024-05-24 DIAGNOSIS — B34.9 ACUTE VIRAL SYNDROME: Primary | ICD-10-CM

## 2024-05-24 DIAGNOSIS — R68.83 CHILLS: ICD-10-CM

## 2024-05-24 DIAGNOSIS — R51.9 ACUTE NONINTRACTABLE HEADACHE, UNSPECIFIED HEADACHE TYPE: ICD-10-CM

## 2024-05-24 DIAGNOSIS — Z20.822 EXPOSURE TO COVID-19 VIRUS: ICD-10-CM

## 2024-05-24 LAB
CTP QC/QA: YES
POC MOLECULAR INFLUENZA A AGN: NEGATIVE
POC MOLECULAR INFLUENZA B AGN: NEGATIVE
POC RSV RAPID ANT MOLECULAR: NEGATIVE
SARS-COV-2 AG RESP QL IA.RAPID: NEGATIVE

## 2024-05-24 PROCEDURE — 99213 OFFICE O/P EST LOW 20 MIN: CPT | Mod: S$GLB,,, | Performed by: FAMILY MEDICINE

## 2024-05-24 PROCEDURE — 87502 INFLUENZA DNA AMP PROBE: CPT | Mod: QW,S$GLB,, | Performed by: FAMILY MEDICINE

## 2024-05-24 PROCEDURE — 87811 SARS-COV-2 COVID19 W/OPTIC: CPT | Mod: QW,S$GLB,, | Performed by: FAMILY MEDICINE

## 2024-05-24 PROCEDURE — 87634 RSV DNA/RNA AMP PROBE: CPT | Mod: QW,S$GLB,, | Performed by: FAMILY MEDICINE

## 2024-05-24 NOTE — PROGRESS NOTES
"Subjective:      Patient ID: Sara David is a 68 y.o. female.    Vitals:  height is 5' 11" (1.803 m) and weight is 93 kg (205 lb). Her temperature is 98.6 °F (37 °C). Her blood pressure is 144/87 (abnormal) and her pulse is 74. Her respiration is 20 and oxygen saturation is 98%.     Chief Complaint: Headache    Pt presents with complaint of headache, congestion, exposure to covid.  Pt states she was exposed to covid last week and started with symptoms about 3 days ago.  Pt states she has taken aspirin for her symptoms but with no relief.     Headache   This is a new problem. The current episode started in the past 7 days. The problem occurs constantly. The problem has been unchanged. The pain is located in the Frontal region. The pain does not radiate. The pain quality is similar to prior headaches. The quality of the pain is described as aching. The pain is at a severity of 5/10. The pain is moderate. The symptoms are aggravated by activity. Treatments tried: aspirin. The treatment provided no relief.       Neurological:  Positive for headaches.      Objective:     Physical Exam   Constitutional: She is oriented to person, place, and time. She appears well-developed. She is cooperative.  Non-toxic appearance. She does not appear ill. No distress.   HENT:   Head: Normocephalic and atraumatic.      Comments: Maxillary Sinus tenderness bilaterally  Ears:   Right Ear: Hearing, tympanic membrane, external ear and ear canal normal.   Left Ear: Hearing, tympanic membrane, external ear and ear canal normal.   Nose: Nose normal. No mucosal edema, rhinorrhea or nasal deformity. No epistaxis. Right sinus exhibits no maxillary sinus tenderness and no frontal sinus tenderness. Left sinus exhibits no maxillary sinus tenderness and no frontal sinus tenderness.   Mouth/Throat: Uvula is midline, oropharynx is clear and moist and mucous membranes are normal. No trismus in the jaw. Normal dentition. No uvula swelling. No " oropharyngeal exudate, posterior oropharyngeal edema or posterior oropharyngeal erythema.   Eyes: Conjunctivae and lids are normal. No scleral icterus.   Neck: Trachea normal and phonation normal. Neck supple. No edema present. No erythema present. No neck rigidity present.   Cardiovascular: Normal rate, regular rhythm, normal heart sounds and normal pulses.   Pulmonary/Chest: Effort normal and breath sounds normal. No respiratory distress. She has no decreased breath sounds. She has no rhonchi.   Abdominal: Normal appearance.   Musculoskeletal: Normal range of motion.         General: No deformity or edema. Normal range of motion.   Neurological: She is alert and oriented to person, place, and time. She exhibits normal muscle tone. Coordination normal.   Skin: Skin is warm, dry, intact, not diaphoretic and not pale.   Psychiatric: Her speech is normal and behavior is normal. Judgment and thought content normal.   Nursing note and vitals reviewed.      Assessment:     1. Acute viral syndrome    2. Exposure to COVID-19 virus    3. Acute nonintractable headache, unspecified headache type    4. Chills        Plan:       Acute viral syndrome    Exposure to COVID-19 virus  -     SARS Coronavirus 2 Antigen, POCT Manual Read    Acute nonintractable headache, unspecified headache type  -     POCT Influenza A/B MOLECULAR  -     POCT RSV by Molecular    Chills  -     POCT Influenza A/B MOLECULAR  -     POCT RSV by Molecular    Recommend she repeat her covid test in the morning before visiting her mother. Also recommend frequent hand washing and mask wearing until improving and fever free for 24 hours.   Pt or guardian provided educational materials and instructions regarding their visit diagnosis.

## 2024-08-19 ENCOUNTER — OFFICE VISIT (OUTPATIENT)
Dept: CARDIOLOGY | Facility: CLINIC | Age: 69
End: 2024-08-19
Attending: FAMILY MEDICINE
Payer: MEDICARE

## 2024-08-19 DIAGNOSIS — R94.31 ABNORMAL EKG: ICD-10-CM

## 2024-08-19 PROCEDURE — 1101F PT FALLS ASSESS-DOCD LE1/YR: CPT | Mod: CPTII,S$GLB,, | Performed by: INTERNAL MEDICINE

## 2024-08-19 PROCEDURE — 99999 PR PBB SHADOW E&M-EST. PATIENT-LVL III: CPT | Mod: PBBFAC,,, | Performed by: INTERNAL MEDICINE

## 2024-08-19 PROCEDURE — 99203 OFFICE O/P NEW LOW 30 MIN: CPT | Mod: S$GLB,,, | Performed by: INTERNAL MEDICINE

## 2024-08-19 PROCEDURE — 3044F HG A1C LEVEL LT 7.0%: CPT | Mod: CPTII,S$GLB,, | Performed by: INTERNAL MEDICINE

## 2024-08-19 PROCEDURE — 1159F MED LIST DOCD IN RCRD: CPT | Mod: CPTII,S$GLB,, | Performed by: INTERNAL MEDICINE

## 2024-08-19 PROCEDURE — 3288F FALL RISK ASSESSMENT DOCD: CPT | Mod: CPTII,S$GLB,, | Performed by: INTERNAL MEDICINE

## 2024-08-19 PROCEDURE — 1160F RVW MEDS BY RX/DR IN RCRD: CPT | Mod: CPTII,S$GLB,, | Performed by: INTERNAL MEDICINE

## 2024-08-19 NOTE — PROGRESS NOTES
[facility]    Chief Complaint  Chief Complaint   Patient presents with    Abnormal ECG       HPI:    Had electrocardiogram performed as part of a routine health maintenance evaluation.  She denies prior cardiac problems or workup.  She does water aerobics several times per week without exertional dyspnea or chest discomfort.  Also goes hiking occasionally.  No exertional symptoms.  Denies paroxysmal nocturnal dyspnea or orthopnea.  No palpitations.  No syncope.    Medications  Current Outpatient Medications   Medication Sig Dispense Refill    cetirizine (ZYRTEC) 10 MG tablet Take 10 mg by mouth once daily.      ergocalciferol, vitamin D2, (VITAMIN D ORAL) Take by mouth. 1000 mcg PO DAILY      estradioL (CLIMARA) 0.0375 mg/24 hr Place 1 patch onto the skin once a week. 4 patch 11    levothyroxine (SYNTHROID) 88 MCG tablet Take 1 tablet (88 mcg total) by mouth before breakfast. 90 tablet 3    meloxicam (MOBIC) 15 MG tablet Take 1 tablet (15 mg total) by mouth once daily. 90 tablet 0    traZODone (DESYREL) 50 MG tablet TAKE 1 TABLET(50 MG) BY MOUTH NIGHTLY AS NEEDED FOR INSOMNIA 90 tablet 0    tretinoin (RETIN-A) 0.025 % cream Compound tretinoin 0.025% / azelaic acid 8% / niacinamide 2% cream. Apply a pea-sized amount to entire face qhs. 30 g 5     No current facility-administered medications for this visit.        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    midline forehead     Past Surgical History:   Procedure Laterality  Date    BACK SURGERY      CYSTOSCOPY N/A 9/18/2023    Procedure: CYSTOSCOPY;  Surgeon: Meche Wynn MD;  Location: [facility] OR;  Service: OB/GYN;  Laterality: N/A;    DILATION AND CURETTAGE OF UTERUS      History of polyps    EXPLORATORY LAPAROTOMY WITH UTERINE MYOMECTOMY      FNA thyroid      in her 40s    LAPAROSCOPIC SALPINGECTOMY Left 9/18/2023    Procedure: SALPINGECTOMY, LAPAROSCOPIC, PARTIAL;  Surgeon: Meche Wynn MD;  Location: [facility] OR;  Service: OB/GYN;  Laterality: Left;    LAPAROSCOPIC TOTAL HYSTERECTOMY N/A 9/18/2023    Procedure: HYSTERECTOMY, TOTAL, LAPAROSCOPIC;  Surgeon: Meche Wynn MD;  Location: [facility] OR;  Service: OB/GYN;  Laterality: N/A;    MALIGNANT SKIN LESION EXCISION      1990s, 2018    SALPINGOOPHORECTOMY      SALPINGOOPHORECTOMY Right 9/18/2023    Procedure: SALPINGO-OOPHORECTOMY;  Surgeon: Meche Wynn MD;  Location: [facility] OR;  Service: OB/GYN;  Laterality: Right;    WISDOM TOOTH EXTRACTION       Social History     Socioeconomic History    Marital status:    Tobacco Use    Smoking status: Former     Types: Cigarettes     Passive exposure: Past    Smokeless tobacco: Never    Tobacco comments:     Quit at age 32 and had smoked for approx 12 years   Substance and Sexual Activity    Alcohol use: Yes     Comment: 2-3 monthly; none 24 hr prior to surgery    Drug use: Not Currently     Comment: occas marijuana in past    Sexual activity: Yes     Partners: Male     Birth control/protection: Post-menopausal   Other Topics Concern    Are you pregnant or think you may be? No    Breast-feeding No   Social History Narrative    Doing water aerobic three times weekly. Also walks.      Social Determinants of Health     Financial Resource Strain: Low Risk  (4/25/2024)    Overall Financial Resource Strain (CARDIA)     Difficulty of Paying Living Expenses: Not hard at all   Food Insecurity: No Food Insecurity (4/25/2024)    Hunger Vital Sign     Worried About Running Out of  Food in the Last Year: Never true     Ran Out of Food in the Last Year: Never true   Transportation Needs: No Transportation Needs (4/25/2024)    PRAPARE - Transportation     Lack of Transportation (Medical): No     Lack of Transportation (Non-Medical): No   Physical Activity: Sufficiently Active (4/25/2024)    Exercise Vital Sign     Days of Exercise per Week: 7 days     Minutes of Exercise per Session: 40 min   Stress: No Stress Concern Present (4/25/2024)    [employer] [place] [employer] - Occupational Stress Questionnaire     Feeling of Stress : Not at all   Housing Stability: Unknown (4/25/2024)    Housing Stability Vital Sign     Unable to Pay for Housing in the Last Year: No     Family History   Problem Relation Name Age of Onset    Hypertension Father      Breast cancer Sister x3     Bone cancer Sister x3     Parkinsonism Sister x3     Multiple sclerosis Sister x3     Lymphoma Maternal Aunt      Arthritis Mother      Chronic back pain Brother x3     Colon cancer Neg Hx      Diabetes Neg Hx      Miscarriages / Stillbirths Neg Hx      Stroke Neg Hx      Melanoma Neg Hx          Allergies  Review of patient's allergies indicates:   Allergen Reactions    Iodinated contrast media Hives and Itching     Was reaction to IV contrast dye       Review of Systems   Review of Systems   Constitutional: Negative for malaise/fatigue, weight gain and weight loss.   Eyes:  Negative for visual disturbance.   Cardiovascular:  Negative for chest pain, claudication, cyanosis, dyspnea on exertion, irregular heartbeat, leg swelling, near-syncope, orthopnea, palpitations, paroxysmal nocturnal dyspnea and syncope.   Respiratory:  Negative for cough, hemoptysis, shortness of breath, sleep disturbances due to breathing and wheezing.    Hematologic/Lymphatic: Negative for bleeding problem. Does not bruise/bleed easily.   Skin:  Negative for poor wound healing.   Musculoskeletal:  Negative for muscle cramps and myalgias.    Gastrointestinal:  Negative for abdominal pain, anorexia, diarrhea, heartburn, hematemesis, hematochezia, melena, nausea and vomiting.   Genitourinary:  Negative for hematuria and nocturia.   Neurological:  Negative for excessive daytime sleepiness, dizziness, focal weakness, light-headedness and weakness.       Physical Exam  Vitals:    08/19/24 1429   BP: (P) 118/64   Pulse: (P) 88     Wt Readings from Last 1 Encounters:   08/19/24 (P) 93.3 kg (205 lb 9.6 oz)     Physical Exam  Vitals and nursing note reviewed.   Constitutional:       General: She is not in acute distress.     Appearance: She is not toxic-appearing or diaphoretic.   HENT:      Head: Normocephalic and atraumatic.      Mouth/Throat:      Lips: Pink.      Mouth: Mucous membranes are moist.   Eyes:      General: No scleral icterus.     Conjunctiva/sclera: Conjunctivae normal.   Neck:      Thyroid: No thyromegaly.      Vascular: No carotid bruit, hepatojugular reflux or JVD.      Trachea: Trachea normal.   Cardiovascular:      Rate and Rhythm: Normal rate and regular rhythm. No extrasystoles are present.     Chest Wall: PMI is not displaced.      Pulses:           Carotid pulses are 2+ on the right side and 2+ on the left side.       Radial pulses are 2+ on the right side and 2+ on the left side.        Dorsalis pedis pulses are 2+ on the right side and 2+ on the left side.        Posterior tibial pulses are 2+ on the right side and 2+ on the left side.      Heart sounds: S1 normal and S2 normal. No murmur heard.     No friction rub. No S3 or S4 sounds.   Pulmonary:      Effort: Pulmonary effort is normal. No accessory muscle usage or respiratory distress.      Breath sounds: Normal breath sounds and air entry. No decreased breath sounds, wheezing, rhonchi or rales.   Abdominal:      General: Bowel sounds are normal. There is no distension or abdominal bruit.      Palpations: Abdomen is soft. There is no hepatomegaly, splenomegaly or pulsatile mass.       Tenderness: There is no abdominal tenderness.   Musculoskeletal:         General: No tenderness or deformity.      Right lower leg: No edema.      Left lower leg: No edema.   Skin:     General: Skin is warm and dry.      Capillary Refill: Capillary refill takes less than 2 seconds.      Coloration: Skin is not cyanotic or pale.      Nails: There is no clubbing.   Neurological:      General: No focal deficit present.      Mental Status: She is alert and oriented to person, place, and time.   Psychiatric:         Attention and Perception: Attention normal.         Mood and Affect: Mood normal.         Speech: Speech normal.         Behavior: Behavior normal. Behavior is cooperative.         Labs  Office Visit on 05/24/2024   Component Date Value Ref Range Status    SARS Coronavirus 2 Antigen 05/24/2024 Negative  Negative Final     Acceptable 05/24/2024 Yes   Final    POC Molecular Influenza A Ag 05/24/2024 Negative  Negative Final    POC Molecular Influenza B Ag 05/24/2024 Negative  Negative Final     Acceptable 05/24/2024 Yes   Final    POC RSV Rapid Ant Molecular 05/24/2024 Negative  Negative Final     Acceptable 05/24/2024 Yes   Final   Lab Visit on 05/13/2024   Component Date Value Ref Range Status    Urine Culture, Routine 05/13/2024 No growth   Final   Patient Outreach on 05/01/2024   Component Date Value Ref Range Status    CRC Recommendation External 03/22/2023 Repeat colonoscopy in 3 years   Final   Hospital Outpatient Visit on 04/26/2024   Component Date Value Ref Range Status    QRS Duration 04/26/2024 90  ms Final    [facility] QTC Calculation 04/26/2024 420  ms Final   Lab Visit on 04/24/2024   Component Date Value Ref Range Status    Sodium 04/24/2024 140  136 - 145 mmol/L Final    Potassium 04/24/2024 4.6  3.5 - 5.1 mmol/L Final    Chloride 04/24/2024 104  95 - 110 mmol/L Final    CO2 04/24/2024 25  23 - 29 mmol/L Final    Glucose 04/24/2024 84  70 - 110 mg/dL  Final    BUN 04/24/2024 16  8 - 23 mg/dL Final    Creatinine 04/24/2024 0.9  0.5 - 1.4 mg/dL Final    Calcium 04/24/2024 10.0  8.7 - 10.5 mg/dL Final    Total Protein 04/24/2024 7.3  6.0 - 8.4 g/dL Final    Albumin 04/24/2024 4.2  3.5 - 5.2 g/dL Final    Total Bilirubin 04/24/2024 0.3  0.1 - 1.0 mg/dL Final    Comment: For infants and newborns, interpretation of results should be based  on gestational age, weight and in agreement with clinical  observations.    Premature Infant recommended reference ranges:  Up to 24 hours.............<8.0 mg/dL  Up to 48 hours............<12.0 mg/dL  3-5 days..................<15.0 mg/dL  6-29 days.................<15.0 mg/dL      Alkaline Phosphatase 04/24/2024 69  55 - 135 U/L Final    AST 04/24/2024 29  10 - 40 U/L Final    ALT 04/24/2024 40  10 - 44 U/L Final    eGFR 04/24/2024 >60.0  >60 mL/min/1.73 m^2 Final    Anion Gap 04/24/2024 11  8 - 16 mmol/L Final    TSH 04/24/2024 0.612  0.400 - 4.000 uIU/mL Final    Hemoglobin A1C 04/24/2024 5.0  4.0 - 5.6 % Final    Comment: ADA Screening Guidelines:  5.7-6.4%  Consistent with prediabetes  >or=6.5%  Consistent with diabetes    High levels of fetal hemoglobin interfere with the HbA1C  assay. Heterozygous hemoglobin variants (HbS, HgC, etc)do  not significantly interfere with this assay.   However, presence of multiple variants may affect accuracy.      Estimated Avg Glucose 04/24/2024 97  68 - 131 mg/dL Final    WBC 04/24/2024 8.32  3.90 - 12.70 K/uL Final    RBC 04/24/2024 4.66  4.00 - 5.40 M/uL Final    Hemoglobin 04/24/2024 15.0  12.0 - 16.0 g/dL Final    Hematocrit 04/24/2024 45.1  37.0 - 48.5 % Final    MCV 04/24/2024 97  82 - 98 fL Final    MCH 04/24/2024 32.2 (H)  27.0 - 31.0 pg Final    MCHC 04/24/2024 33.3  32.0 - 36.0 g/dL Final    RDW 04/24/2024 13.4  11.5 - 14.5 % Final    Platelets 04/24/2024 323  150 - 450 K/uL Final    MPV 04/24/2024 9.5  9.2 - 12.9 fL Final    Immature Granulocytes 04/24/2024 0.5  0.0 - 0.5 % Final     Gran # (ANC) 04/24/2024 5.2  1.8 - 7.7 K/uL Final    Immature Grans (Abs) 04/24/2024 0.04  0.00 - 0.04 K/uL Final    Comment: Mild elevation in immature granulocytes is non specific and   can be seen in a variety of conditions including stress response,   acute inflammation, trauma and pregnancy. Correlation with other   laboratory and clinical findings is essential.      Lymph # 04/24/2024 2.2  1.0 - 4.8 K/uL Final    Mono # 04/24/2024 0.7  0.3 - 1.0 K/uL Final    Eos # 04/24/2024 0.2  0.0 - 0.5 K/uL Final    Baso # 04/24/2024 0.04  0.00 - 0.20 K/uL Final    nRBC 04/24/2024 0  0 /100 WBC Final    Gran % 04/24/2024 62.6  38.0 - 73.0 % Final    Lymph % 04/24/2024 25.8  18.0 - 48.0 % Final    Mono % 04/24/2024 8.1  4.0 - 15.0 % Final    Eosinophil % 04/24/2024 2.5  0.0 - 8.0 % Final    Basophil % 04/24/2024 0.5  0.0 - 1.9 % Final    Differential Method 04/24/2024 Automated   Final   Office Visit on 04/24/2024   Component Date Value Ref Range Status    Specimen UA 04/24/2024 Urine, Clean Catch   Final    Color, UA 04/24/2024 Yellow  Yellow, Straw, Giovana Final    Appearance, UA 04/24/2024 Clear  Clear Final    pH, UA 04/24/2024 6.0  5.0 - 8.0 Final    Specific Gravity, UA 04/24/2024 1.025  1.005 - 1.030 Final    Protein, UA 04/24/2024 Negative  Negative Final    Comment: Recommend a 24 hour urine protein or a urine   protein/creatinine ratio if globulin induced proteinuria is  clinically suspected.      Glucose, UA 04/24/2024 Negative  Negative Final    Ketones, UA 04/24/2024 Negative  Negative Final    Bilirubin (UA) 04/24/2024 Negative  Negative Final    Occult Blood UA 04/24/2024 Negative  Negative Final    Nitrite, UA 04/24/2024 Negative  Negative Final    Leukocytes, UA 04/24/2024 3+ (A)  Negative Final    Protein, Urine Random 04/24/2024 <7  0 - 15 mg/dL Final    Creatinine, Urine 04/24/2024 134.0  15.0 - 325.0 mg/dL Final    Prot/Creat Ratio, Urine 04/24/2024 Unable to calculate  0.00 - 0.20 Final    RBC, UA  04/24/2024 2  0 - 4 /hpf Final    WBC, UA 04/24/2024 13 (H)  0 - 5 /hpf Final    Bacteria 04/24/2024 Rare  None-Occ /hpf Final    Squam Epithel, UA 04/24/2024 1  /hpf Final    Microscopic Comment 04/24/2024 SEE COMMENT   Final    Comment: Other formed elements not mentioned in the report are not   present in the microscopic examination.          Imaging  No results found.    Assessment  1. Abnormal EKG  Nonspecific ST changes  - Ambulatory referral/consult to Cardiology      Plan and Discussion    Her EKG changes are nonspecific.  We discussed that implication.  She has good functional status and requires no further cardiac evaluation.    The 10-year ASCVD risk score (Dayan DK, et al., 2019) is: 9.4%    Values used to calculate the score:      Age: 69 years      Sex: Female      Is Non- : No      Diabetic: No      Tobacco smoker: No      Systolic Blood Pressure: 118 mmHg      Is BP treated: No      HDL Cholesterol: 57 mg/dL      Total Cholesterol: 237 mg/dL     Follow Up  Follow up if symptoms worsen or fail to improve.      Ole Andrews MD

## 2024-09-18 ENCOUNTER — OFFICE VISIT (OUTPATIENT)
Dept: DERMATOLOGY | Facility: CLINIC | Age: 69
End: 2024-09-18
Payer: MEDICARE

## 2024-09-18 DIAGNOSIS — D22.9 MULTIPLE BENIGN NEVI: ICD-10-CM

## 2024-09-18 DIAGNOSIS — L81.4 LENTIGINES: ICD-10-CM

## 2024-09-18 DIAGNOSIS — Z12.83 SCREENING EXAM FOR SKIN CANCER: ICD-10-CM

## 2024-09-18 DIAGNOSIS — D48.5 NEOPLASM OF UNCERTAIN BEHAVIOR OF SKIN: Primary | ICD-10-CM

## 2024-09-18 DIAGNOSIS — Z85.828 HISTORY OF NONMELANOMA SKIN CANCER: ICD-10-CM

## 2024-09-18 DIAGNOSIS — Z86.006 HISTORY OF MELANOMA IN SITU: ICD-10-CM

## 2024-09-18 DIAGNOSIS — L82.1 SEBORRHEIC KERATOSIS: ICD-10-CM

## 2024-09-18 DIAGNOSIS — Z80.8 FAMILY HISTORY OF MELANOMA: ICD-10-CM

## 2024-09-18 PROCEDURE — 3288F FALL RISK ASSESSMENT DOCD: CPT | Mod: CPTII,S$GLB,, | Performed by: DERMATOLOGY

## 2024-09-18 PROCEDURE — 99213 OFFICE O/P EST LOW 20 MIN: CPT | Mod: 25,S$GLB,, | Performed by: DERMATOLOGY

## 2024-09-18 PROCEDURE — 3044F HG A1C LEVEL LT 7.0%: CPT | Mod: CPTII,S$GLB,, | Performed by: DERMATOLOGY

## 2024-09-18 PROCEDURE — 1160F RVW MEDS BY RX/DR IN RCRD: CPT | Mod: CPTII,S$GLB,, | Performed by: DERMATOLOGY

## 2024-09-18 PROCEDURE — 1159F MED LIST DOCD IN RCRD: CPT | Mod: CPTII,S$GLB,, | Performed by: DERMATOLOGY

## 2024-09-18 PROCEDURE — 1126F AMNT PAIN NOTED NONE PRSNT: CPT | Mod: CPTII,S$GLB,, | Performed by: DERMATOLOGY

## 2024-09-18 PROCEDURE — 1101F PT FALLS ASSESS-DOCD LE1/YR: CPT | Mod: CPTII,S$GLB,, | Performed by: DERMATOLOGY

## 2024-09-18 PROCEDURE — 11102 TANGNTL BX SKIN SINGLE LES: CPT | Mod: S$GLB,,, | Performed by: DERMATOLOGY

## 2024-09-18 PROCEDURE — 11103 TANGNTL BX SKIN EA SEP/ADDL: CPT | Mod: S$GLB,,, | Performed by: DERMATOLOGY

## 2024-09-18 RX ORDER — MELOXICAM 15 MG/1
TABLET ORAL
Qty: 90 TABLET | Refills: 0 | Status: SHIPPED | OUTPATIENT
Start: 2024-09-18

## 2024-09-18 NOTE — TELEPHONE ENCOUNTER
No care due was identified.  [facility] Embedded Care Due Messages. Reference number: [id number].   9/18/2024 11:20:53 AM CDT

## 2024-09-18 NOTE — PROGRESS NOTES
"  Patient Information  Name: Sara David  : 1955  MRN: [id number]     Referring Physician:  No ref. provider found   Primary Care Physician:  Sharon Stout MD   Date of Visit: 2024      Subjective:     History of Present lllness:    Sara David is a 69 y.o. female who presents with a chief complaint of moles, spot, and lesion.  This is a high risk patient with a personal history of MIS and nonmelanoma skin cancer and family history of melanoma (sister) who is here today to check for the development of new lesions.   Patient is here today for a "mole" check.   Today, patient complains of:    Spot  Location: left ear  Duration: 6 months  Signs/Symptoms: non-healing  Exacerbating factors: none  Relieving factors/Prior treatments: none    Lesion  Location: right shoulder  Duration: 3-4 months  Signs/Symptoms: new funny looking spot  Exacerbating factors: none  Relieving factors/Prior treatments: none    Patient was last seen: 4/3/2024.  Prior notes by myself reviewed.   Clinical documentation obtained by nursing staff reviewed.    Review of Systems    Objective:   Physical Exam   Constitutional: She appears well-developed and well-nourished. No distress.   HENT:   Mouth/Throat: Lips normal.    Eyes: Lids are normal.  No conjunctival no injection.   Neurological: She is alert and oriented to person, place, and time. She is not disoriented.   Psychiatric: She has a normal mood and affect.   Skin:   Areas Examined (abnormalities noted in diagram):   Scalp / Hair Palpated and Inspected  Head / Face Inspection Performed  Neck Inspection Performed  Chest / Axilla Inspection Performed  Abdomen Inspection Performed  Genitals / Buttocks / Groin Inspection Performed  Back Inspection Performed  RUE Inspected  LUE Inspection Performed  RLE Inspected  LLE Inspection Performed  Nails and Digits Inspection Performed  Gland Inspection Performed                     Diagram Legend     Erythematous " scaling macule/papule c/w actinic keratosis       Vascular papule c/w angioma      Pigmented verrucoid papule/plaque c/w seborrheic keratosis      Yellow umbilicated papule c/w sebaceous hyperplasia      Irregularly shaped tan macule c/w lentigo     1-2 mm smooth white papules consistent with Milia      Movable subcutaneous cyst with punctum c/w epidermal inclusion cyst      Subcutaneous movable cyst c/w pilar cyst      Firm pink to brown papule c/w dermatofibroma      Pedunculated fleshy papule(s) c/w skin tag(s)      Evenly pigmented macule c/w junctional nevus     Mildly variegated pigmented, slightly irregular-bordered macule c/w mildly atypical nevus      Flesh colored to evenly pigmented papule c/w intradermal nevus       Pink pearly papule/plaque c/w basal cell carcinoma      Erythematous hyperkeratotic cursted plaque c/w SCC      Surgical scar with no sign of skin cancer recurrence      Open and closed comedones      Inflammatory papules and pustules      Verrucoid papule consistent consistent with wart     Erythematous eczematous patches and plaques     Dystrophic onycholytic nail with subungual debris c/w onychomycosis     Umbilicated papule    Erythematous-base heme-crusted tan verrucoid plaque consistent with inflamed seborrheic keratosis     Erythematous Silvery Scaling Plaque c/w Psoriasis     See annotation              [] Data reviewed  [] Prior external notes reviewed  [] Independent review of test  [] Management discussed with another provider  [] Independent historian    Assessment / Plan:      Pathology Orders:       Normal Orders This Visit    Specimen to Pathology, Dermatology     Comments:    Number of Specimens:->3  ------------------------->-------------------------  Spec 1 Procedure:->Biopsy  Spec 1 Clinical Impression:->r/o dysplastic nevus  Spec 1 Source:->left calf  ------------------------->-------------------------  Spec 2 Procedure:->Biopsy  Spec 2 Clinical Impression:->r/o dysplastic  nevus  Spec 2 Source:->left upper arm  ------------------------->-------------------------  Spec 3 Procedure:->Biopsy  Spec 3 Clinical Impression:->r/o LM  Spec 3 Source:->right shoulder    Questions:    Procedure Type: Dermatology and skin neoplasms    Number of Specimens: 3    ------------------------: -------------------------    Spec 1 Procedure: Biopsy    Spec 1 Clinical Impression: r/o dysplastic nevus    Spec 1 Source: left calf    ------------------------: -------------------------    Spec 2 Procedure: Biopsy    Spec 2 Clinical Impression: r/o dysplastic nevus    Spec 2 Source: left upper arm    ------------------------: -------------------------    Spec 3 Procedure: Biopsy    Spec 3 Clinical Impression: r/o LM    Spec 3 Source: right shoulder    Release to patient:           Neoplasm of uncertain behavior of skin  -     Specimen to Pathology, Dermatology    Shave biopsy procedure note x 3:  Risk, benefits, and alternatives of biopsy are discussed with the patient, including risk of infection, scar, recurrence, and need for additional treatment of site. The patient agrees to the procedure by verbal consent. The area is marked and prepped with alcohol.  Approximately 1 mL of lidocaine 1% with epinephrine is used for local anesthesia. A sharp blade is used to remove a portion of the lesion. The specimen is sent for pathology. Hemostasis is obtained with aluminum chloride and/or monopolar hyfrecation if needed. The area is then dressed and bandaged. The patient tolerated the procedure well without adverse event. Written instructions on wound care were given and were reviewed with the patient, who is to call for any signs of bleeding or infection. The patient will be notified of the pathology results.    Multiple benign nevi  Multiple benign-appearing nevi present on exam today. Reassurance provided. Counseled patient to periodically examine moles and return to clinic if any changes in size, shape, or color are  noted or if it becomes symptomatic (bleeding, itching, pain, etc).  Recommend using a broad-spectrum, water-resistant sunscreen with SPF of 30 or higher--reapply every 2 hours. Seek shade, wear sun-protective clothing, and perform regular skin self-exams.    Lentigines  These are benign sun spots which should be monitored for changes. Daily sun protection will reduce the number of new lesions.   Recommend using a broad-spectrum, water-resistant sunscreen with SPF of 30 or higher--reapply every 2 hours. Seek shade, wear sun-protective clothing, and perform regular skin self-exams.    Seborrheic keratosis  These are benign, inherited growths without a malignant potential. Reassurance given to patient. No treatment is necessary.    History of melanoma in situ (left arm, 04/2018)   - stable and chronic  An estimated 4% to 8% of patients with a history of melanoma develop a new primary melanoma, typically within the first 3 to 5 years following diagnosis. The risk of new primary melanoma is higher in the setting of increased nevus count, multiple clinically atypical/dysplastic nevi, family history of melanoma, fair skin/sun sensitivity, prior melanoma, and male sex.    Area of previous melanoma examined. Site well-healed with no signs of recurrence.  Total body skin examination performed today as noted in physical exam. Recommended continuing routine skin exams as well as routine checks with ophthalmology, dentist, and OB/GYN (if female) for any concerning lesions.  Recommend using a broad-spectrum, water-resistant sunscreen with SPF of 30 or higher--reapply every 2 hours. Seek shade, wear sun-protective clothing, and perform regular skin self-exams.    History of nonmelanoma skin cancer   - stable and chronic  Area(s) of previous nonmelanoma skin cancer evaluated with no evidence of recurrence. Reassurance provided.  Recommend using a broad-spectrum, water-resistant sunscreen with SPF of 30 or higher--reapply every 2  hours. Seek shade, wear sun-protective clothing, and perform regular skin self-exams.    Family history of melanoma, sister  Screening exam for skin cancer  Total body skin examination performed today as noted in physical exam. Suspicious lesion(s) were noted and/or biopsied as above.  Recommend using a broad-spectrum, water-resistant sunscreen with SPF of 30 or higher--reapply every 2 hours. Seek shade, wear sun-protective clothing, and perform regular skin self-exams.         Follow up in about 6 months (around 3/18/2025) for TBSE, or sooner dependent on pathology results.      Mary Jo Sotomayor MD, FAAD  [facility]

## 2024-09-18 NOTE — PATIENT INSTRUCTIONS
Biopsy Wound Care Instructions    Leave the bandage on for 24 hours without getting it wet.   Clean the area once a day with a gentle soap and water, then pat dry and apply Vaseline and a bandaid.  The site should be kept moist with Vaseline at all times to improve healing. Reapply a thick coating as needed. Do not let the site air out or form a scab, as this will delay healing and worsen scarring.  If any bleeding or oozing occurs once you return home, apply firm pressure to the area for 30 minutes straight without peeking. If bleeding continues, call the office immediately.  Please message us via [facility], call us at [phone], or return to the office at any sign of increasing redness, swelling, tenderness, pain, heat, yellow drainage/discharge, or continued bleeding.      Receiving Your Pathology Results    Your pathology results will be released to you on [facility] at the same time that Dr. Sotomayor receives them.   Dr. Sotomayor will then message you with her interpretation of the results and/or with the plan going forward.  If you do not use [facility] or if your pathology results require more of an explanation, you will receive your results via a phone call.  If 2 weeks go by and you have not received your results, please message us via [facility] or call us at [phone] to inform us.                   Hydrocolloid Bandage    These bandages can be found at your local pharmacy in first [employer] or [employer].  Apply the bandage to clean, dry skin. Press and hold top of bandage once it is applied to warm the bandage.  Please note: There area where bandage adheres to wound bed will become white and puffy; this is not infection and a normal part of the healing process.  Do not change bandage until edges roll up.  When bandage is ready to be changed, remove carefully and slowly.  Wash wound with gentle cleanser and fingertips.  Make sure area is completely dry before applying new bandage.  Make sure wound  bed is in the center of the bandage.  Repeat process until fresh pink skin covers wound bed or until bandage no longer become white and puffy.

## 2024-09-25 ENCOUNTER — TELEPHONE (OUTPATIENT)
Dept: DERMATOLOGY | Facility: CLINIC | Age: 69
End: 2024-09-25
Payer: MEDICARE

## 2024-09-25 ENCOUNTER — PATIENT MESSAGE (OUTPATIENT)
Dept: DERMATOLOGY | Facility: CLINIC | Age: 69
End: 2024-09-25
Payer: MEDICARE

## 2024-09-25 NOTE — TELEPHONE ENCOUNTER
I attempted to call the patient to discuss the below biopsy results and treatment plan, but no answer.   Left a message asking patient to call me back.       Final Pathologic Diagnosis   Date Value Ref Range Status   09/18/2024 (A)  Final    1. Skin, left calf, shave biopsy:    - MELANOMA IN SITU   - The tumor involves the peripheral tissue edge. The deep tissue edge is uninvolved by tumor.     This lesion is skin cancer.  You will be contacted regarding treatment.    2. Skin, left upper arm,  shave biopsy:   - COMPOUND NEVUS    This lesion is benign.     3. Skin, right shoulder, shave biopsy:    - LENTIGO WITH MELANOCYTIC HYPERPLASIA    This lesion is benign.         Comment:     Interp By Khushbu Solis MD., Signed on 09/24/2024 at 15:36

## 2024-09-26 ENCOUNTER — TELEPHONE (OUTPATIENT)
Dept: DERMATOLOGY | Facility: CLINIC | Age: 69
End: 2024-09-26
Payer: MEDICARE

## 2024-09-26 NOTE — TELEPHONE ENCOUNTER
Spoke with patient regarding pathology results.  Discussed risks, benefits, and alternatives of excision, including but not limited to scarring, bleeding (increased risk due to meloxicam), infection (increased risk due to location on lower leg), recurrence, and need for additional treatment of site.    Will schedule MIS surgery for Thurs, Oct 24th at 11 a.m. after she returns from her hiking trip.  Reviewed pre- and post-operative instructions, and handout was emailed.  Pt verbalized understanding.       Final Pathologic Diagnosis   Date Value Ref Range Status   09/18/2024 (A)  Final    1. Skin, left calf, shave biopsy:    - MELANOMA IN SITU   - The tumor involves the peripheral tissue edge. The deep tissue edge is uninvolved by tumor.     This lesion is skin cancer.  You will be contacted regarding treatment.    2. Skin, left upper arm,  shave biopsy:   - COMPOUND NEVUS    This lesion is benign.     3. Skin, right shoulder, shave biopsy:    - LENTIGO WITH MELANOCYTIC HYPERPLASIA    This lesion is benign.         Comment:     Interp By Khushbu Solis MD., Signed on 09/24/2024 at 15:36

## 2024-09-26 NOTE — TELEPHONE ENCOUNTER
----- Message from Treasure Renee LPN sent at 9/25/2024  4:35 PM CDT -----  Regarding: FW: result //returning call  Contact: [phone]    ----- Message -----  From: Nanci Sims  Sent: 9/25/2024   4:27 PM CDT  To: Светлана Camargo Staff  Subject: result //returning call                          Type:  Patient Returning Call    Who Called:Pt  Who Left Message for Patient:Dr. Sotomayor  Does the patient know what this is regarding?:results  Would the patient rather a call back or a response via [facility]? call back   Best Call Back Number:[phone]  Additional Information:

## 2024-10-22 NOTE — TELEPHONE ENCOUNTER
No care due was identified.  Health [facility] Embedded Care Due Messages. Reference number: [id number].   10/22/2024 11:31:11 AM CDT

## 2024-10-22 NOTE — TELEPHONE ENCOUNTER
Refill Encounter    PCP Visits: Recent Visits  Date Type Provider Dept   04/24/24 Office Visit Sharon Stout MD [facility]   Showing recent visits within past 360 days and meeting all other requirements  Future Appointments  Date Type Provider Dept   11/07/24 Appointment Sharon Stout MD [facility]   Showing future appointments within next 720 days and meeting all other requirements     Last 3 Blood Pressure:   BP Readings from Last 3 Encounters:   08/19/24 (P) 118/64   05/24/24 (!) 144/87   04/24/24 125/74     Preferred Pharmacy:   [id number] [place], [place] - [address] [facility]  [address] [facility] [address]  Phone: [phone] Fax: [phone]    [facility] [address] [facility]  [address] [facility] [address]  Phone: [phone] Fax: [phone]    [facility] #[id number] - [place] - [address] [facility] [address]  [address] [address]  Phone: [phone] Fax: [phone]    Requested RX:  Requested Prescriptions     Pending Prescriptions Disp Refills    meloxicam (MOBIC) 15 MG tablet [Pharmacy Med Name: MELOXICAM 15MG TABLETS] 90 tablet 0     Sig: TAKE 1 TABLET(15 MG) BY MOUTH DAILY      RX Route: Normal

## 2024-10-22 NOTE — TELEPHONE ENCOUNTER
No care due was identified.  Health [facility] Embedded Care Due Messages. Reference number: [id number].   10/22/2024 9:11:50 AM CDT

## 2024-10-22 NOTE — TELEPHONE ENCOUNTER
Refill Encounter    PCP Visits: Recent Visits  Date Type Provider Dept   04/24/24 Office Visit Sharon Stout MD [facility]   Showing recent visits within past 360 days and meeting all other requirements  Future Appointments  Date Type Provider Dept   11/07/24 Appointment Sharon Stout MD [facility]   Showing future appointments within next 720 days and meeting all other requirements     Last 3 Blood Pressure:   BP Readings from Last 3 Encounters:   08/19/24 (P) 118/64   05/24/24 (!) 144/87   04/24/24 125/74     Preferred Pharmacy:   [id number] [place], [place] - [address] [facility]  [address] [facility] [address]  Phone: [phone] Fax: [phone]    [facility] [address] [facility]  [address] [facility] [address]  Phone: [phone] Fax: [phone]    [facility] #[id number] - [place] - [address] [facility] [address]  [address] [address]  Phone: [phone] Fax: [phone]    Requested RX:  Requested Prescriptions     Pending Prescriptions Disp Refills    meloxicam (MOBIC) 15 MG tablet [Pharmacy Med Name: MELOXICAM 15MG TABLETS] 90 tablet 0     Sig: TAKE 1 TABLET(15 MG) BY MOUTH DAILY      RX Route: Normal

## 2024-10-24 ENCOUNTER — PROCEDURE VISIT (OUTPATIENT)
Dept: DERMATOLOGY | Facility: CLINIC | Age: 69
End: 2024-10-24
Payer: MEDICARE

## 2024-10-24 ENCOUNTER — PATIENT OUTREACH (OUTPATIENT)
Dept: ADMINISTRATIVE | Facility: HOSPITAL | Age: 69
End: 2024-10-24
Payer: MEDICARE

## 2024-10-24 DIAGNOSIS — D03.72 MELANOMA IN SITU OF LEFT LOWER EXTREMITY: Primary | ICD-10-CM

## 2024-10-24 DIAGNOSIS — Z12.31 ENCOUNTER FOR SCREENING MAMMOGRAM FOR MALIGNANT NEOPLASM OF BREAST: Primary | ICD-10-CM

## 2024-10-24 RX ORDER — MELOXICAM 15 MG/1
TABLET ORAL
Qty: 90 TABLET | Refills: 0 | Status: SHIPPED | OUTPATIENT
Start: 2024-10-24

## 2024-10-24 RX ORDER — CEPHALEXIN 500 MG/1
500 CAPSULE ORAL EVERY 8 HOURS
Qty: 42 CAPSULE | Refills: 0 | Status: SHIPPED | OUTPATIENT
Start: 2024-10-24 | End: 2024-11-07

## 2024-10-24 NOTE — PROGRESS NOTES
PROCEDURE NOTE: EXCISION WITH COMPLEX REPAIR    DIAGNOSIS: Melanoma in situ     LOCATION: left calf     ASSISTANT: Treasure Renee LPN    ANESTHESIA:  1% Lidocaine with Epinephrine and Sodium bicarbonate, 9 mL    PREPARATION: The diagnosis, procedure, alternatives, benefits and risks, including but not limited to: infection, bleeding/bruising, scar or cosmetic defect, local sensation disturbances, wound dehiscence (separation of wound edges after sutures removed) and/or recurrence of present condition were explained to the patient. The patient elected to proceed with the procedure by signing an informed consent.  Patient's identity was verified, and the site was verified.    PROCEDURE: Lesional tissue was carefully marked with at least 6 mm margins of clinically normal skin in all directions. The area to be anesthetized was cleaned with Hibiclens solution, injected with local anesthesia, and again prepped with Hibiclens and draped in sterile fashion to produce a suitable field for surgery. A fusiform excision was performed with a #10 blade carried down completely through the skin and subcutaneous tissue to the level of superficial fascia, and dissection was carried out in that plane. The specimen of tissue was removed to be sent for histologic confirmation. Bleeding points were stopped with monopolar hyfrecation or tied with absorbable suture as needed throughout the procedure to maintain hemostasis.     CLOSURE: The wound was extensively undermined to a distance greater than the maximum width of the defect (> 2 cm) along at least one edge of the defect to mobilize tissue and to reduce tension on the sutured wound edges.  2-0 PDS II absorbable suture was used for a central pulley suture to reduce tension on the wound. 3-0 PDS II absorbable suture was used for buried vertical mattress sutures through dermis and subcutaneous tissue to close the wound and to kerry the cutaneous wound edge. This allows for  preservation of structure and function of surrounding anatomy. The epidermis and dermis were sutured with 3-0 Ethilon vertical mattress sutures in an interrupted fashion.     SIZE OF DEFECT: 1.8 x 1.6 cm    LENGTH OF REPAIR: 5.2 cm     The area was then cleaned, and a sterile pressure dressing applied. The procedure was tolerated well without adverse event and negligible blood loss. The patient was discharged in stable condition.     Post op instructions: the patient was verbally (and in writing) educated on care of the wound and told to call or return for bleeding, tenderness, redness, etc. Patient should use acetaminophen 1000 mg every 8 hours for pain relief if needed.     Suture removal in 2-3 weeks.

## 2024-11-07 ENCOUNTER — OFFICE VISIT (OUTPATIENT)
Dept: FAMILY MEDICINE | Facility: CLINIC | Age: 69
End: 2024-11-07
Attending: FAMILY MEDICINE
Payer: MEDICARE

## 2024-11-07 ENCOUNTER — CLINICAL SUPPORT (OUTPATIENT)
Dept: DERMATOLOGY | Facility: CLINIC | Age: 69
End: 2024-11-07
Payer: MEDICARE

## 2024-11-07 VITALS
DIASTOLIC BLOOD PRESSURE: 80 MMHG | HEART RATE: 75 BPM | WEIGHT: 196 LBS | SYSTOLIC BLOOD PRESSURE: 114 MMHG | OXYGEN SATURATION: 99 % | BODY MASS INDEX: 27.34 KG/M2

## 2024-11-07 DIAGNOSIS — E03.9 ACQUIRED HYPOTHYROIDISM: Primary | ICD-10-CM

## 2024-11-07 DIAGNOSIS — Z78.0 MENOPAUSE: ICD-10-CM

## 2024-11-07 DIAGNOSIS — Z48.02 VISIT FOR SUTURE REMOVAL: Primary | ICD-10-CM

## 2024-11-07 PROCEDURE — 3288F FALL RISK ASSESSMENT DOCD: CPT | Mod: CPTII,S$GLB,, | Performed by: FAMILY MEDICINE

## 2024-11-07 PROCEDURE — 3008F BODY MASS INDEX DOCD: CPT | Mod: CPTII,S$GLB,, | Performed by: FAMILY MEDICINE

## 2024-11-07 PROCEDURE — 1126F AMNT PAIN NOTED NONE PRSNT: CPT | Mod: CPTII,S$GLB,, | Performed by: FAMILY MEDICINE

## 2024-11-07 PROCEDURE — 99024 POSTOP FOLLOW-UP VISIT: CPT | Mod: S$GLB,,, | Performed by: DERMATOLOGY

## 2024-11-07 PROCEDURE — 1101F PT FALLS ASSESS-DOCD LE1/YR: CPT | Mod: CPTII,S$GLB,, | Performed by: FAMILY MEDICINE

## 2024-11-07 PROCEDURE — 3074F SYST BP LT 130 MM HG: CPT | Mod: CPTII,S$GLB,, | Performed by: FAMILY MEDICINE

## 2024-11-07 PROCEDURE — 1159F MED LIST DOCD IN RCRD: CPT | Mod: CPTII,S$GLB,, | Performed by: FAMILY MEDICINE

## 2024-11-07 PROCEDURE — 3079F DIAST BP 80-89 MM HG: CPT | Mod: CPTII,S$GLB,, | Performed by: FAMILY MEDICINE

## 2024-11-07 PROCEDURE — 3044F HG A1C LEVEL LT 7.0%: CPT | Mod: CPTII,S$GLB,, | Performed by: FAMILY MEDICINE

## 2024-11-07 PROCEDURE — 99999 PR PBB SHADOW E&M-EST. PATIENT-LVL III: CPT | Mod: PBBFAC,,, | Performed by: FAMILY MEDICINE

## 2024-11-07 PROCEDURE — 99213 OFFICE O/P EST LOW 20 MIN: CPT | Mod: S$GLB,,, | Performed by: FAMILY MEDICINE

## 2024-11-07 RX ORDER — ESTRADIOL 0.04 MG/D
PATCH TRANSDERMAL
Qty: 12 PATCH | Refills: 0 | Status: SHIPPED | OUTPATIENT
Start: 2024-11-07

## 2024-11-07 NOTE — TELEPHONE ENCOUNTER
Refill Decision Note   Sara David  is requesting a refill authorization.  Brief Assessment and Rationale for Refill:  Approve     Medication Therapy Plan:  .      Comments:     Note composed:10:55 AM 11/07/2024

## 2024-11-07 NOTE — PROGRESS NOTES
Patient Information  Name: Sara David  : 1955  MRN: [id number]     Suture Removal note:  CC: 69 y.o. female patient is here for suture removal.    HPI: Patient is s/p excision of MIS from the left calf on 10/24/24.  Patient reports no problems.    WOUND PE:  Sutures intact.  Wound healing well.  Good approximation of skin edges.  No signs or symptoms of infection.    IMPRESSION:    - HEALING BIOPSY SITE, NO RESIDUAL MELANOCYTIC NEOPLASM IS PRESENT.     PLAN:  Pulley suture removed today.  Continue wound care.    RTC: On 24 for suture check and potential removal.

## 2024-11-07 NOTE — PROGRESS NOTES
Subjective:       Patient ID: Sara David is a 69 y.o. female.    Chief Complaint: Thyroid Problem    Thyroid Problem  Patient reports no constipation, diarrhea, fatigue, menstrual problem or palpitations.     Pt is here for follow up of hypothyroid no temp intolerance no excessive fatigue   Review of Systems   Constitutional:  Negative for activity change, chills, fatigue, fever and unexpected weight change.   HENT:  Positive for rhinorrhea. Negative for hearing loss and trouble swallowing.    Eyes:  Negative for discharge and visual disturbance.   Respiratory:  Negative for chest tightness and wheezing.    Cardiovascular:  Negative for chest pain and palpitations.   Gastrointestinal:  Negative for blood in stool, constipation, diarrhea and vomiting.   Endocrine: Negative for polydipsia and polyuria.   Genitourinary:  Negative for difficulty urinating, dysuria, hematuria and menstrual problem.   Musculoskeletal:  Negative for arthralgias, joint swelling and neck pain.   Neurological:  Negative for weakness and headaches.   Psychiatric/Behavioral:  Negative for confusion and dysphoric mood.        Objective:    /80   Pulse 75   Wt 88.9 kg (196 lb)   LMP 01/17/2020   SpO2 99%   BMI 27.34 kg/m²     Physical Exam  Constitutional:       Appearance: Normal appearance. She is not ill-appearing.   Cardiovascular:      Rate and Rhythm: Normal rate and regular rhythm.      Heart sounds:      No gallop.   Pulmonary:      Effort: Pulmonary effort is normal. No respiratory distress.   Neurological:      General: No focal deficit present.      Mental Status: She is alert and oriented to person, place, and time.      Cranial Nerves: No cranial nerve deficit.      Coordination: Coordination normal.   Psychiatric:         Mood and Affect: Mood normal.         Behavior: Behavior normal.         Thought Content: Thought content normal.         Judgment: Judgment normal.       Tsh 0.6 in 4/2024  Assessment:      "  1. Acquired hypothyroidism        Plan:     Orders cmp tsh lipid urine  Cont meds  Low fat diet  Graded exercise  Rtc 6 months         "This note will not be shared with the patient."   "

## 2024-11-08 ENCOUNTER — LAB VISIT (OUTPATIENT)
Dept: LAB | Facility: HOSPITAL | Age: 69
End: 2024-11-08
Attending: FAMILY MEDICINE
Payer: MEDICARE

## 2024-11-08 DIAGNOSIS — E03.9 ACQUIRED HYPOTHYROIDISM: ICD-10-CM

## 2024-11-08 LAB
ALBUMIN SERPL BCP-MCNC: 3.9 G/DL (ref 3.5–5.2)
ALP SERPL-CCNC: 67 U/L (ref 40–150)
ALT SERPL W/O P-5'-P-CCNC: 20 U/L (ref 10–44)
ANION GAP SERPL CALC-SCNC: 7 MMOL/L (ref 8–16)
AST SERPL-CCNC: 18 U/L (ref 10–40)
BILIRUB SERPL-MCNC: 0.5 MG/DL (ref 0.1–1)
BUN SERPL-MCNC: 12 MG/DL (ref 8–23)
CALCIUM SERPL-MCNC: 9.5 MG/DL (ref 8.7–10.5)
CHLORIDE SERPL-SCNC: 108 MMOL/L (ref 95–110)
CHOLEST SERPL-MCNC: 220 MG/DL (ref 120–199)
CHOLEST/HDLC SERPL: 3.4 {RATIO} (ref 2–5)
CO2 SERPL-SCNC: 25 MMOL/L (ref 23–29)
CREAT SERPL-MCNC: 1 MG/DL (ref 0.5–1.4)
EST. GFR  (NO RACE VARIABLE): >60 ML/MIN/1.73 M^2
GLUCOSE SERPL-MCNC: 90 MG/DL (ref 70–110)
HDLC SERPL-MCNC: 64 MG/DL (ref 40–75)
HDLC SERPL: 29.1 % (ref 20–50)
LDLC SERPL CALC-MCNC: 129.2 MG/DL (ref 63–159)
NONHDLC SERPL-MCNC: 156 MG/DL
POTASSIUM SERPL-SCNC: 4.8 MMOL/L (ref 3.5–5.1)
PROT SERPL-MCNC: 7 G/DL (ref 6–8.4)
SODIUM SERPL-SCNC: 140 MMOL/L (ref 136–145)
TRIGL SERPL-MCNC: 134 MG/DL (ref 30–150)
TSH SERPL DL<=0.005 MIU/L-ACNC: 0.75 UIU/ML (ref 0.4–4)

## 2024-11-08 PROCEDURE — 84443 ASSAY THYROID STIM HORMONE: CPT | Performed by: FAMILY MEDICINE

## 2024-11-08 PROCEDURE — 36415 COLL VENOUS BLD VENIPUNCTURE: CPT | Mod: PO | Performed by: FAMILY MEDICINE

## 2024-11-08 PROCEDURE — 80061 LIPID PANEL: CPT | Performed by: FAMILY MEDICINE

## 2024-11-08 PROCEDURE — 80053 COMPREHEN METABOLIC PANEL: CPT | Performed by: FAMILY MEDICINE

## 2024-11-13 ENCOUNTER — PATIENT MESSAGE (OUTPATIENT)
Dept: FAMILY MEDICINE | Facility: CLINIC | Age: 69
End: 2024-11-13
Payer: MEDICARE

## 2024-11-14 ENCOUNTER — CLINICAL SUPPORT (OUTPATIENT)
Dept: DERMATOLOGY | Facility: CLINIC | Age: 69
End: 2024-11-14
Payer: MEDICARE

## 2024-11-14 DIAGNOSIS — Z48.02 VISIT FOR SUTURE REMOVAL: Primary | ICD-10-CM

## 2024-11-15 NOTE — PROGRESS NOTES
Patient presents for suture removal. The wound is well healed without signs of infection.  The sutures are removed. Return prn.

## 2024-12-01 DIAGNOSIS — G47.9 SLEEP DIFFICULTIES: ICD-10-CM

## 2024-12-01 NOTE — TELEPHONE ENCOUNTER
No care due was identified.  [facility] Embedded Care Due Messages. Reference number: [id number].   12/01/2024 1:24:05 PM CST

## 2024-12-01 NOTE — TELEPHONE ENCOUNTER
No care due was identified.  Health [facility] Embedded Care Due Messages. Reference number: [id number].   12/01/2024 1:23:34 PM CST

## 2024-12-02 RX ORDER — TRAZODONE HYDROCHLORIDE 50 MG/1
TABLET ORAL
Qty: 90 TABLET | Refills: 3 | Status: SHIPPED | OUTPATIENT
Start: 2024-12-02

## 2024-12-02 RX ORDER — TRAZODONE HYDROCHLORIDE 50 MG/1
TABLET ORAL
Qty: 90 TABLET | Refills: 0 | OUTPATIENT
Start: 2024-12-02

## 2024-12-02 NOTE — TELEPHONE ENCOUNTER
Refill Decision Note   Sara Frankie  is requesting a refill authorization.  Brief Assessment and Rationale for Refill:  Quick Discontinue     Medication Therapy Plan: Pended in another encounter      Comments:     Note composed:7:52 AM 12/02/2024

## 2024-12-02 NOTE — TELEPHONE ENCOUNTER
Refill Decision Note   Sara Nikitamadhav  is requesting a refill authorization.  Brief Assessment and Rationale for Refill:  Approve     Medication Therapy Plan:         Comments:     Note composed:7:53 AM 12/02/2024

## 2025-01-15 ENCOUNTER — OFFICE VISIT (OUTPATIENT)
Dept: DERMATOLOGY | Facility: CLINIC | Age: 70
End: 2025-01-15
Payer: MEDICARE

## 2025-01-15 DIAGNOSIS — D22.9 MULTIPLE BENIGN NEVI: ICD-10-CM

## 2025-01-15 DIAGNOSIS — D03.72 MELANOMA IN SITU OF LEFT LOWER EXTREMITY: Primary | ICD-10-CM

## 2025-01-15 DIAGNOSIS — Z85.828 HISTORY OF NONMELANOMA SKIN CANCER: ICD-10-CM

## 2025-01-15 DIAGNOSIS — Z12.83 SCREENING EXAM FOR SKIN CANCER: ICD-10-CM

## 2025-01-15 DIAGNOSIS — L81.4 LENTIGINES: ICD-10-CM

## 2025-01-15 DIAGNOSIS — B35.1 ONYCHOMYCOSIS: ICD-10-CM

## 2025-01-15 DIAGNOSIS — Z86.006 HISTORY OF MELANOMA IN SITU: ICD-10-CM

## 2025-01-15 DIAGNOSIS — L82.1 SEBORRHEIC KERATOSIS: ICD-10-CM

## 2025-01-15 PROCEDURE — 1126F AMNT PAIN NOTED NONE PRSNT: CPT | Mod: CPTII,S$GLB,, | Performed by: DERMATOLOGY

## 2025-01-15 PROCEDURE — 3288F FALL RISK ASSESSMENT DOCD: CPT | Mod: CPTII,S$GLB,, | Performed by: DERMATOLOGY

## 2025-01-15 PROCEDURE — 99214 OFFICE O/P EST MOD 30 MIN: CPT | Mod: S$GLB,,, | Performed by: DERMATOLOGY

## 2025-01-15 PROCEDURE — 1101F PT FALLS ASSESS-DOCD LE1/YR: CPT | Mod: CPTII,S$GLB,, | Performed by: DERMATOLOGY

## 2025-01-15 PROCEDURE — G2211 COMPLEX E/M VISIT ADD ON: HCPCS | Mod: S$GLB,,, | Performed by: DERMATOLOGY

## 2025-01-15 PROCEDURE — 1160F RVW MEDS BY RX/DR IN RCRD: CPT | Mod: CPTII,S$GLB,, | Performed by: DERMATOLOGY

## 2025-01-15 PROCEDURE — 1159F MED LIST DOCD IN RCRD: CPT | Mod: CPTII,S$GLB,, | Performed by: DERMATOLOGY

## 2025-01-15 RX ORDER — CICLOPIROX OLAMINE 7.7 MG/G
CREAM TOPICAL
Qty: 30 G | Refills: 5 | Status: CANCELLED | OUTPATIENT
Start: 2025-01-15

## 2025-01-15 RX ORDER — CICLOPIROX 80 MG/ML
SOLUTION TOPICAL
Qty: 1 EACH | Refills: 11 | Status: SHIPPED | OUTPATIENT
Start: 2025-01-15

## 2025-01-15 RX ORDER — PRENATAL VIT 91/IRON/FOLIC/DHA 28-975-200
COMBINATION PACKAGE (EA) ORAL
Qty: 60 G | Refills: 1 | Status: SHIPPED | OUTPATIENT
Start: 2025-01-15

## 2025-01-15 NOTE — PROGRESS NOTES
"  Patient Information  Name: Sara David  : 1955  MRN: [id number]     Referring Physician:  No ref. provider found   Primary Care Physician:  Sharon Stout MD   Date of Visit: 1/15/25      Subjective:     History of Present lllness:    Sara David is a 69 y.o. female who presents with a chief complaint of moles.  This is a high risk patient with a personal history of melanoma in situ and NMSC  who is here today to check for the development of new lesions.  Patient is here today for a "mole" check. Denies any new, changing, or symptomatic lesions on the skin.  Today, patient also complains of toenail fungus.    Patient was last seen: 10/24/2024.  Prior notes by myself reviewed.   Clinical documentation obtained by nursing staff reviewed.    Review of Systems    Objective:   Physical Exam   Constitutional: She appears well-developed and well-nourished. No distress.   HENT:   Mouth/Throat: Lips normal.    Eyes: Lids are normal.  No conjunctival no injection.   Neurological: She is alert and oriented to person, place, and time. She is not disoriented.   Psychiatric: She has a normal mood and affect.   Skin:   Areas Examined (abnormalities noted in diagram):   Scalp / Hair Palpated and Inspected  Head / Face Inspection Performed  Neck Inspection Performed  Chest / Axilla Inspection Performed  Abdomen Inspection Performed  Genitals / Buttocks / Groin Inspection Performed  Back Inspection Performed  RUE Inspected  LUE Inspection Performed  RLE Inspected  LLE Inspection Performed  Nails and Digits Inspection Performed  Gland Inspection Performed                     Diagram Legend     Erythematous scaling macule/papule c/w actinic keratosis       Vascular papule c/w angioma      Pigmented verrucoid papule/plaque c/w seborrheic keratosis      Yellow umbilicated papule c/w sebaceous hyperplasia      Irregularly shaped tan macule c/w lentigo     1-2 mm smooth white papules consistent with Milia      " Movable subcutaneous cyst with punctum c/w epidermal inclusion cyst      Subcutaneous movable cyst c/w pilar cyst      Firm pink to brown papule c/w dermatofibroma      Pedunculated fleshy papule(s) c/w skin tag(s)      Evenly pigmented macule c/w junctional nevus     Mildly variegated pigmented, slightly irregular-bordered macule c/w mildly atypical nevus      Flesh colored to evenly pigmented papule c/w intradermal nevus       Pink pearly papule/plaque c/w basal cell carcinoma      Erythematous hyperkeratotic cursted plaque c/w SCC      Surgical scar with no sign of skin cancer recurrence      Open and closed comedones      Inflammatory papules and pustules      Verrucoid papule consistent consistent with wart     Erythematous eczematous patches and plaques     Dystrophic onycholytic nail with subungual debris c/w onychomycosis     Umbilicated papule    Erythematous-base heme-crusted tan verrucoid plaque consistent with inflamed seborrheic keratosis     Erythematous Silvery Scaling Plaque c/w Psoriasis     See annotation    No images are attached to the encounter or orders placed in the encounter.      [] Data reviewed  [] Prior external notes reviewed  [] Independent review of test  [] Management discussed with another provider  [] Independent historian    Assessment / Plan:        Melanoma in situ of left lower extremity  History of melanoma in situ (L arm, 4/2018)   - stable and chronic  An estimated 4% to 8% of patients with a history of melanoma develop a new primary melanoma, typically within the first 3 to 5 years following diagnosis. The risk of new primary melanoma is higher in the setting of increased nevus count, multiple clinically atypical/dysplastic nevi, family history of melanoma, fair skin/sun sensitivity, prior melanoma, and male sex.    Area of previous melanoma examined. Site well-healed with no signs of recurrence.  No lymphadenopathy palpated on exam today.  Total body skin examination  performed today as noted in physical exam. Recommended continuing routine skin exams as well as routine checks with ophthalmology, dentist, and OB/GYN (if female) for any concerning lesions.  Recommend using a broad-spectrum, water-resistant sunscreen with SPF of 30 or higher--reapply every 2 hours. Seek shade, wear sun-protective clothing, and perform regular skin self-exams.    Multiple benign nevi  Multiple benign-appearing nevi present on exam today. Reassurance provided. Counseled patient to periodically examine moles and return to clinic if any changes in size, shape, or color are noted or if it becomes symptomatic (bleeding, itching, pain, etc).  Recommend using a broad-spectrum, water-resistant sunscreen with SPF of 30 or higher--reapply every 2 hours. Seek shade, wear sun-protective clothing, and perform regular skin self-exams.    Lentigines  These are benign sun spots which should be monitored for changes. Daily sun protection will reduce the number of new lesions.   Recommend using a broad-spectrum, water-resistant sunscreen with SPF of 30 or higher--reapply every 2 hours. Seek shade, wear sun-protective clothing, and perform regular skin self-exams.    Onychomycosis  - chronic problem, not at treatment goal  -     ciclopirox (PENLAC) 8 % Soln; Apply to affected nails qhs. Clean nails with alcohol once weekly.  Dispense: 1 each; Refill: 11  -     terbinafine HCL (LAMISIL AT) 1 % cream; Apply to feet BID x 4 weeks.  Dispense: 60 g; Refill: 1    Seborrheic keratosis  These are benign, inherited growths without a malignant potential. Reassurance given to patient. No treatment is necessary.    Screening exam for skin cancer  Total body skin examination performed today as noted in physical exam. No lesions suspicious for malignancy were seen.  Recommend using a broad-spectrum, water-resistant sunscreen with SPF of 30 or higher--reapply every 2 hours. Seek shade, wear sun-protective clothing, and perform  regular skin self-exams.    History of nonmelanoma skin cancer   - stable and chronic  Area(s) of previous nonmelanoma skin cancer evaluated with no evidence of recurrence. Reassurance provided.  Recommend using a broad-spectrum, water-resistant sunscreen with SPF of 30 or higher--reapply every 2 hours. Seek shade, wear sun-protective clothing, and perform regular skin self-exams.         Follow up in about 6 months (around 7/15/2025) for follow up, or sooner if any new problems or changing lesions.      Mary Jo Sotomayor MD, FAAD  [facility]

## 2025-01-16 DIAGNOSIS — Z78.0 MENOPAUSE: ICD-10-CM

## 2025-01-16 RX ORDER — ESTRADIOL 0.04 MG/D
1 PATCH TRANSDERMAL WEEKLY
Qty: 12 PATCH | Refills: 0 | Status: SHIPPED | OUTPATIENT
Start: 2025-01-16

## 2025-01-16 NOTE — TELEPHONE ENCOUNTER
Refill Routing Note   Medication(s) are not appropriate for processing by [facility] for the following reason(s):      Failed Mammography is up-to-date per Health Maintenance    [facility] action(s):  Defer Care Due:  None identified     Medication Therapy Plan: Failed Mammography is up-to-date per Health Maintenance      Appointments  past 12m or future 3m with PCP    Date Provider   Last Visit   11/10/2023 Meche Wynn MD   Next Visit   Visit date not found Meche Wynn MD   ED visits in past 90 days: 0        Note composed:12:36 PM 01/16/2025

## 2025-01-20 ENCOUNTER — PATIENT MESSAGE (OUTPATIENT)
Dept: RADIOLOGY | Facility: OTHER | Age: 70
End: 2025-01-20
Payer: MEDICARE

## 2025-01-29 ENCOUNTER — HOSPITAL ENCOUNTER (OUTPATIENT)
Dept: RADIOLOGY | Facility: OTHER | Age: 70
Discharge: HOME | End: 2025-01-29
Attending: FAMILY MEDICINE
Payer: MEDICARE

## 2025-01-29 DIAGNOSIS — Z12.31 ENCOUNTER FOR SCREENING MAMMOGRAM FOR MALIGNANT NEOPLASM OF BREAST: ICD-10-CM

## 2025-01-29 PROCEDURE — 77063 BREAST TOMOSYNTHESIS BI: CPT | Mod: TC

## 2025-01-29 PROCEDURE — 77067 SCR MAMMO BI INCL CAD: CPT | Mod: 26,,, | Performed by: RADIOLOGY

## 2025-01-29 PROCEDURE — 77063 BREAST TOMOSYNTHESIS BI: CPT | Mod: 26,,, | Performed by: RADIOLOGY

## 2025-02-18 ENCOUNTER — ON-DEMAND VIRTUAL (OUTPATIENT)
Dept: URGENT CARE | Facility: CLINIC | Age: 70
End: 2025-02-18
Payer: MEDICARE

## 2025-02-18 DIAGNOSIS — R42 DIZZINESS: Primary | ICD-10-CM

## 2025-02-18 NOTE — PATIENT INSTRUCTIONS
Meclizine over the counter as directed.      Patient Education       Vertigo (a Type of Dizziness) Discharge Instructions   About this topic   Vertigo is a kind of dizziness. It can make you feel like you are spinning, swaying, or tilting. You may also feel like the room is moving around you. You may feel light-headed or have trouble walking straight when you are dizzy. Vertigo can come and go. It might only last a little while or it might last for days.  Vertigo can be caused by many different things, including inner ear problems, infection, migraine, certain medicines, injury, and stroke.     What care is needed at home?   Ask your doctor what you need to do when you go home. Make sure you ask questions if you do not understand what the doctor says.  Take extra care to protect yourself from falls.  Sit or lie down right away if you feel faint or dizzy.  Avoid driving if you feel dizzy. If you start to feel dizzy while driving, pull over right away.  You may need to use a cane or walker to help you walk without falling while you are dizzy.  Take extra care when you change positions.  Avoid changing your position too quickly. Go slowly when moving from sitting to standing.  After you wake up, sit on the edge of the bed for a few minutes before you stand up. Start to walk slowly after you stand up.  Move your legs often if you need to sit or  one position for a long time.  If you were given exercises to help with your vertigo, follow instructions for how and when to do them.  What follow-up care is needed?   You may need to see your doctor for help with an upset stomach or to give you more fluids if you keep throwing up. Your doctor may ask you to make visits to the office to check on your progress. Be sure to keep these visits.  What drugs may be needed?   The doctor may order drugs to:  Treat allergies  Treat infection  Stop pain  Block certain messages between nerves  Calm you and help you relax  Treat  upset stomach or throwing up  Will physical activity be limited?   Do not drive or work with heavy or dangerous machines until your signs of illness are gone. Climbing can also be risky and should be avoided.  What problems could happen?   You may have fluid loss due to lots of throwing up.  You may fall when you lose balance. You could get hurt or break a bone.  When do I need to call the doctor?   You have signs of stroke like sudden:  Numbness or weakness of the face, arm, or leg, especially on one side of the body.  Confusion, trouble speaking or understanding.  Trouble seeing in one or both eyes.  Trouble walking, dizziness, loss of balance or coordination.  Severe headache with no known cause.  You pass out.  Your vertigo episodes are lasting longer or coming more frequently than they used to.  You are not able to walk or stand because of your vertigo.  You continue to throw up.  Teach Back: Helping You Understand   The Teach Back Method helps you understand the information we are giving you. After you talk with the staff, tell them in your own words what you learned. This helps to make sure the staff has described each thing clearly. It also helps to explain things that may have been confusing. Before going home, make sure you can do these:  I can tell you about my condition.  I can tell you what I will do to help me stay safe when moving about.  I can tell you what I will do if I have problems talking or moving.  Where can I learn more?   [facility]  http://www.health.govt.nz/your-health/conditions-and-treatments/diseases-and-illnesses/dizziness   [facility] Choices  http://www.nhs.uk/conditions/dizziness/pages/introduction.aspx   [facility] Choices  http://www.nhs.uk/conditions/vertigo/pages/introduction.aspx   Last Reviewed Date   2021-06-08  Consumer Information Use and Disclaimer   This information is not specific medical advice and does not replace information you receive from your health care provider. This  is only a brief summary of general information. It does NOT include all information about conditions, illnesses, injuries, tests, procedures, treatments, therapies, discharge instructions or life-style choices that may apply to you. You must talk with your health care provider for complete information about your health and treatment options. This information should not be used to decide whether or not to accept your health care providers advice, instructions or recommendations. Only your health care provider has the knowledge and training to provide advice that is right for you.  Copyright   Copyright © 2021 [employer]. and its affiliates and/or licensors. All rights reserved.

## 2025-02-18 NOTE — PROGRESS NOTES
Subjective:      Patient ID: Sara David is a 69 y.o. female.    Vitals:  vitals were not taken for this visit.     Chief Complaint: Dizziness      Visit Type: TELE AUDIOVISUAL    Patient Location: Home     Present with the patient at the time of consultation: TELEMED PRESENT WITH PATIENT: None    Past Medical History:   Diagnosis Date    Basal cell carcinoma of nasal tip 05/17/2023    R nasal tip    Colon polyp 2008    Disorder of kidney and ureter     Endometriosis     History of melanoma in situ 04/17/2018    Left arm, s/p excision by Alexandre Gomez MD    Hypothyroidism     dx in her 40s, enlarged thyroid, did have FNA of thyroid nodule which was benign    Melanoma 1990, 2018 1990s - on back; 2018 - L upper arm    Miscarriage     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    midline forehead     Past Surgical History:   Procedure Laterality Date    BACK SURGERY      CYSTOSCOPY N/A 09/18/2023    Procedure: CYSTOSCOPY;  Surgeon: Meche Wynn MD;  Location: [facility], [place] OR;  Service: OB/GYN;  Laterality: N/A;    DILATION AND CURETTAGE OF UTERUS      History of polyps    EXPLORATORY LAPAROTOMY WITH UTERINE MYOMECTOMY      FNA thyroid      in her 40s    HYSTERECTOMY      LAPAROSCOPIC SALPINGECTOMY Left 09/18/2023    Procedure: SALPINGECTOMY, LAPAROSCOPIC, PARTIAL;  Surgeon: Meche Wynn MD;  Location: [facility], [place] OR;  Service: OB/GYN;  Laterality: Left;    LAPAROSCOPIC TOTAL HYSTERECTOMY N/A 09/18/2023    Procedure: HYSTERECTOMY, TOTAL, LAPAROSCOPIC;  Surgeon: Meche Wynn MD;  Location: [facility], [place] OR;  Service: OB/GYN;  Laterality: N/A;    MALIGNANT SKIN LESION EXCISION      1990s, 2018    SALPINGOOPHORECTOMY      SALPINGOOPHORECTOMY Right 09/18/2023    Procedure: SALPINGO-OOPHORECTOMY;  Surgeon: Meche Wynn MD;  Location: [facility] [place] OR;  Service: OB/GYN;  Laterality: Right;    WISDOM TOOTH EXTRACTION       Review of patient's allergies indicates:   Allergen Reactions     Iodinated contrast media Hives and Itching     Was reaction to IV contrast dye     Medications Ordered Prior to Encounter[1]  Family History   Problem Relation Name Age of Onset    Hypertension Father      Breast cancer Sister x3     Bone cancer Sister x3     Parkinsonism Sister x3     Multiple sclerosis Sister x3     Lymphoma Maternal Aunt      Arthritis Mother      Chronic back pain Brother x3     Colon cancer Neg Hx      Diabetes Neg Hx      Miscarriages / Stillbirths Neg Hx      Stroke Neg Hx      Melanoma Neg Hx             Ohs Peq Odvv Intake    2/18/2025  8:35 AM CST - Filed by Patient   What is your current physical address in the event of a medical emergency? [address] [place] [address]   Are you able to take your vital signs? No   Please attach any relevant images or files    Is your employer contracted with [facility]? No         Last night laid down in bed and the room started spinning. Stopped when sitting up. Has been intermittent throughout the morning. Mild nausea. No hx of vertigo. Sinus headache yesterday. No congestion or PND. No fluid in the ear. No new medications. No other associated symptoms to report. Traveling tomorrow so seeking further treatment advice at this time.    Dizziness:    Associated symptoms: headaches (sinus) and nausea (mild).no ear pain, no fever, no weakness and no light-headedness.      Constitution: Negative for fever.   HENT:  Negative for ear pain, congestion and postnasal drip.    Gastrointestinal:  Positive for nausea (mild).   Neurological:  Positive for dizziness and headaches (sinus). Negative for history of vertigo, light-headedness, passing out, facial drooping, speech difficulty, coordination disturbances, loss of balance, disorientation, altered mental status, loss of consciousness, numbness and tingling.   Psychiatric/Behavioral:  Negative for altered mental status and disorientation.         Objective:   The physical exam was conducted  virtually.  Physical Exam   Constitutional: She is oriented to person, place, and time. She does not appear ill. No distress.   HENT:   Head: Normocephalic and atraumatic.   Nose: Nose normal.   Eyes: Extraocular movement intact   Pulmonary/Chest: Effort normal.   Abdominal: Normal appearance.   Musculoskeletal: Normal range of motion.         General: Normal range of motion.   Neurological: no focal deficit. She is alert and oriented to person, place, and time.   Psychiatric: Her behavior is normal. Mood normal.   Vitals reviewed.      Assessment:     1. Dizziness        Plan:   Follow-up as discussed.    Patient encouraged to monitor symptoms closely and instructed to follow-up for new or worsening symptoms. Further, in-person, evaluation may be necessary for continued treatment. Please follow up with your primary care doctor or specialist as needed. Verbally discussed plan. Patient confirms understanding and is in agreement with treatment and plan.     You must understand that you've received a [facility] Care evaluation only and that you may be released before all your medical problems are known or treated. You, the patient, will arrange for follow up care as instructed.      Dizziness             Patient Instructions   Meclizine over the counter as directed.      Patient Education       Vertigo (a Type of Dizziness) Discharge Instructions   About this topic   Vertigo is a kind of dizziness. It can make you feel like you are spinning, swaying, or tilting. You may also feel like the room is moving around you. You may feel light-headed or have trouble walking straight when you are dizzy. Vertigo can come and go. It might only last a little while or it might last for days.  Vertigo can be caused by many different things, including inner ear problems, infection, migraine, certain medicines, injury, and stroke.     What care is needed at home?   Ask your doctor what you need to do when you go home. Make sure you ask  questions if you do not understand what the doctor says.  Take extra care to protect yourself from falls.  Sit or lie down right away if you feel faint or dizzy.  Avoid driving if you feel dizzy. If you start to feel dizzy while driving, pull over right away.  You may need to use a cane or walker to help you walk without falling while you are dizzy.  Take extra care when you change positions.  Avoid changing your position too quickly. Go slowly when moving from sitting to standing.  After you wake up, sit on the edge of the bed for a few minutes before you stand up. Start to walk slowly after you stand up.  Move your legs often if you need to sit or  one position for a long time.  If you were given exercises to help with your vertigo, follow instructions for how and when to do them.  What follow-up care is needed?   You may need to see your doctor for help with an upset stomach or to give you more fluids if you keep throwing up. Your doctor may ask you to make visits to the office to check on your progress. Be sure to keep these visits.  What drugs may be needed?   The doctor may order drugs to:  Treat allergies  Treat infection  Stop pain  Block certain messages between nerves  Calm you and help you relax  Treat upset stomach or throwing up  Will physical activity be limited?   Do not drive or work with heavy or dangerous machines until your signs of illness are gone. Climbing can also be risky and should be avoided.  What problems could happen?   You may have fluid loss due to lots of throwing up.  You may fall when you lose balance. You could get hurt or break a bone.  When do I need to call the doctor?   You have signs of stroke like sudden:  Numbness or weakness of the face, arm, or leg, especially on one side of the body.  Confusion, trouble speaking or understanding.  Trouble seeing in one or both eyes.  Trouble walking, dizziness, loss of balance or coordination.  Severe headache with no known  cause.  You pass out.  Your vertigo episodes are lasting longer or coming more frequently than they used to.  You are not able to walk or stand because of your vertigo.  You continue to throw up.  Teach Back: Helping You Understand   The Teach Back Method helps you understand the information we are giving you. After you talk with the staff, tell them in your own words what you learned. This helps to make sure the staff has described each thing clearly. It also helps to explain things that may have been confusing. Before going home, make sure you can do these:  I can tell you about my condition.  I can tell you what I will do to help me stay safe when moving about.  I can tell you what I will do if I have problems talking or moving.  Where can I learn more?   [facility]  http://www.health.govt.nz/your-health/conditions-and-treatments/diseases-and-illnesses/dizziness   [facility]HS Choices  http://www.nhs.uk/conditions/dizziness/pages/introduction.aspx   [facility] Choices  http://www.nhs.uk/conditions/vertigo/pages/introduction.aspx   Last Reviewed Date   2021-06-08  Consumer Information Use and Disclaimer   This information is not specific medical advice and does not replace information you receive from your health care provider. This is only a brief summary of general information. It does NOT include all information about conditions, illnesses, injuries, tests, procedures, treatments, therapies, discharge instructions or life-style choices that may apply to you. You must talk with your health care provider for complete information about your health and treatment options. This information should not be used to decide whether or not to accept your health care providers advice, instructions or recommendations. Only your health care provider has the knowledge and training to provide advice that is right for you.  Copyright   Copyright © 2021 UpToDate, Inc. and its affiliates and/or licensors. All rights reserved.                 [1]   Current Outpatient Medications on File Prior to Visit   Medication Sig Dispense Refill    cetirizine (ZYRTEC) 10 MG tablet Take 10 mg by mouth once daily.      ciclopirox (PENLAC) 8 % Soln Apply to affected nails qhs. Clean nails with alcohol once weekly. 1 each 11    ergocalciferol, vitamin D2, (VITAMIN D ORAL) Take by mouth. 1000 mcg PO DAILY      estradioL (CLIMARA) 0.0375 mg/24 hr Place 1 patch onto the skin once a week. APPLY 1 PATCH TOPICALLY TO THE SKIN 1 TIME A WEEK 12 patch 0    levothyroxine (SYNTHROID) 88 MCG tablet Take 1 tablet (88 mcg total) by mouth before breakfast. 90 tablet 3    meloxicam (MOBIC) 15 MG tablet TAKE 1 TABLET(15 MG) BY MOUTH DAILY 90 tablet 0    terbinafine HCL (LAMISIL AT) 1 % cream Apply to feet BID x 4 weeks. 60 g 1    traZODone (DESYREL) 50 MG tablet TAKE 1 TABLET(50 MG) BY MOUTH EVERY NIGHT AS NEEDED FOR INSOMNIA 90 tablet 3    tretinoin (RETIN-A) 0.025 % cream Compound tretinoin 0.025% / azelaic acid 8% / niacinamide 2% cream. Apply a pea-sized amount to entire face qhs. 30 g 5     No current facility-administered medications on file prior to visit.

## 2025-03-24 DIAGNOSIS — Z00.00 ENCOUNTER FOR MEDICARE ANNUAL WELLNESS EXAM: ICD-10-CM

## 2025-04-03 RX ORDER — MELOXICAM 15 MG/1
15 TABLET ORAL DAILY
Qty: 90 TABLET | Refills: 0 | Status: SHIPPED | OUTPATIENT
Start: 2025-04-03

## 2025-04-03 NOTE — TELEPHONE ENCOUNTER
Care Due:                  Date            Visit Type   Department     Provider  --------------------------------------------------------------------------------                                MYCHART                              ANNUAL                              CHECKUP/PHY  [facility]  Last Visit: 11-      [facility]       Sharon Stout  Next Visit: None Scheduled  None         None Found                                                            Last  Test          Frequency    Reason                     Performed    Due Date  --------------------------------------------------------------------------------    CBC.........  12 months..  meloxicam................  04- 04-    [employer] Embedded Care Due Messages. Reference number: [id number].   4/03/2025 7:30:31 AM CDT

## 2025-05-08 DIAGNOSIS — Z78.0 MENOPAUSE: ICD-10-CM

## 2025-05-08 RX ORDER — ESTRADIOL 0.04 MG/D
PATCH TRANSDERMAL
Qty: 12 PATCH | Refills: 0 | OUTPATIENT
Start: 2025-05-08

## 2025-05-12 ENCOUNTER — TELEPHONE (OUTPATIENT)
Dept: OBSTETRICS AND GYNECOLOGY | Facility: CLINIC | Age: 70
End: 2025-05-12
Payer: MEDICARE

## 2025-05-12 NOTE — TELEPHONE ENCOUNTER
----- Message from Priscila sent at 2025  3:30 PM CDT -----  [phone]  Type: Patient Call BackWho called: self What is the request in detail: pt is leaving town this weekend and is needing her refill, no appts available appts before and she only wants to see provider. She asked if she can be seen virtually? She stated her pharmacy has trouble getting the patch she stated she would like it sent ASAP she is leaving for the summer.Can the clinic reply by [facility]? Yes Would the patient rather a call back or a response via [facility]? My chart Best call back number: [phone]

## 2025-05-14 ENCOUNTER — OFFICE VISIT (OUTPATIENT)
Dept: OBSTETRICS AND GYNECOLOGY | Facility: CLINIC | Age: 70
End: 2025-05-14
Payer: MEDICARE

## 2025-05-14 ENCOUNTER — TELEPHONE (OUTPATIENT)
Dept: OBSTETRICS AND GYNECOLOGY | Facility: CLINIC | Age: 70
End: 2025-05-14
Payer: MEDICARE

## 2025-05-14 VITALS
DIASTOLIC BLOOD PRESSURE: 87 MMHG | BODY MASS INDEX: 28.51 KG/M2 | WEIGHT: 204.44 LBS | SYSTOLIC BLOOD PRESSURE: 147 MMHG

## 2025-05-14 DIAGNOSIS — Z78.0 MENOPAUSE: ICD-10-CM

## 2025-05-14 DIAGNOSIS — R39.9 UTI SYMPTOMS: ICD-10-CM

## 2025-05-14 DIAGNOSIS — Z01.419 WELL WOMAN EXAM WITH ROUTINE GYNECOLOGICAL EXAM: Primary | ICD-10-CM

## 2025-05-14 DIAGNOSIS — N89.8 VAGINAL DISCHARGE: ICD-10-CM

## 2025-05-14 PROCEDURE — 1101F PT FALLS ASSESS-DOCD LE1/YR: CPT | Mod: CPTII,S$GLB,, | Performed by: OBSTETRICS & GYNECOLOGY

## 2025-05-14 PROCEDURE — 3288F FALL RISK ASSESSMENT DOCD: CPT | Mod: CPTII,S$GLB,, | Performed by: OBSTETRICS & GYNECOLOGY

## 2025-05-14 PROCEDURE — 3077F SYST BP >= 140 MM HG: CPT | Mod: CPTII,S$GLB,, | Performed by: OBSTETRICS & GYNECOLOGY

## 2025-05-14 PROCEDURE — 3079F DIAST BP 80-89 MM HG: CPT | Mod: CPTII,S$GLB,, | Performed by: OBSTETRICS & GYNECOLOGY

## 2025-05-14 PROCEDURE — 99999 PR PBB SHADOW E&M-EST. PATIENT-LVL III: CPT | Mod: PBBFAC,,, | Performed by: OBSTETRICS & GYNECOLOGY

## 2025-05-14 PROCEDURE — 81515 NFCT DS BV&VAGINITIS DNA ALG: CPT | Performed by: OBSTETRICS & GYNECOLOGY

## 2025-05-14 PROCEDURE — 1125F AMNT PAIN NOTED PAIN PRSNT: CPT | Mod: CPTII,S$GLB,, | Performed by: OBSTETRICS & GYNECOLOGY

## 2025-05-14 PROCEDURE — 3008F BODY MASS INDEX DOCD: CPT | Mod: CPTII,S$GLB,, | Performed by: OBSTETRICS & GYNECOLOGY

## 2025-05-14 PROCEDURE — 99397 PER PM REEVAL EST PAT 65+ YR: CPT | Mod: S$GLB,,, | Performed by: OBSTETRICS & GYNECOLOGY

## 2025-05-14 PROCEDURE — 87086 URINE CULTURE/COLONY COUNT: CPT | Performed by: OBSTETRICS & GYNECOLOGY

## 2025-05-14 PROCEDURE — 1159F MED LIST DOCD IN RCRD: CPT | Mod: CPTII,S$GLB,, | Performed by: OBSTETRICS & GYNECOLOGY

## 2025-05-14 RX ORDER — ESTRADIOL 0.04 MG/D
1 PATCH TRANSDERMAL WEEKLY
Qty: 12 PATCH | Refills: 3 | Status: SHIPPED | OUTPATIENT
Start: 2025-05-14

## 2025-05-14 NOTE — TELEPHONE ENCOUNTER
----- Message from [email or web address] sent at 5/14/2025  1:15 PM CDT -----  Name of Who is Calling: DANIELLE JACK [[id number]]What is the request in detail: Pt called to schedule virtual appt to refill script.Please contact to further discuss and advise.Can the clinic reply by [facility]: [facility]at [facility]  [phone]

## 2025-05-14 NOTE — PROGRESS NOTES
OBSTETRIC HISTORY:   OB History          2    Para        Term   0            AB   2    Living             SAB        IAB        Ectopic        Multiple        Live Births                      COMPREHENSIVE GYN HISTORY:  PAP History: Denies abnormal Paps.  Infection History: Denies STDs. Denies PID.  Benign History: Denies uterine fibroids. Denies ovarian cysts. Denies endometriosis.   Cancer History: MELANOMA Denies cervical cancer. Denies uterine cancer or hyperplasia. Denies ovarian cancer. Denies vulvar cancer or pre-cancer. Denies vaginal cancer or pre-cancer. Denies breast cancer. Denies colon cancer.  Sexual Activity History:   reports being sexually active and has had partner(s) who are male. She reports using the following method of birth control/protection: Post-menopausal.       HPI:   69 y.o. Patient's last menstrual period was 2020.   Patient is  here for her annual gynecologic exam. She has no GYN complaints. UTI symptoms. She denies bowel and breast complaints.     ROS:  GENERAL: No weight gain or weight loss.   CHEST: No shortness of breath.   ABDOMEN: No abdominal pain, constipation, diarrhea, nausea, vomiting or rectal bleeding.   URINARY: No frequency,  hematuria.  REPRODUCTIVE: See HPI.   BREASTS: No breast pain, lumps, or nipple discharge.   PSYCHIATRIC: No depression or anxiety.        PE:   BP (!) 147/87   Wt 92.7 kg (204 lb 7 oz)   LMP 2020   BMI 28.51 kg/m²   APPEARANCE: Well nourished, well developed, in no acute distress.  NECK: Neck symmetric without thyromegaly.  NODES: No inguinal or cervical lymph node enlargement.  CHEST: Lungs clear to auscultation.  HEART: Regular rate and rhythm, no murmurs, rubs or gallops.  ABDOMEN: Soft. No tenderness or masses. No hernias.  BREASTS: Symmetrical, no skin changes or visible lesions. No palpable masses, nipple discharge or adenopathy bilaterally.  VULVA: No lesions. Normal female genitalia.  URETHRAL MEATUS: Normal  size and location, no lesions, no prolapse.  URETHRA: No masses, tenderness, prolapse or scarring.  VAGINA: Atrophic and not well rugated, some discharge, no significant cystocele or rectocele.  CERVIX AND UTERUS SURGICALLY ABSENT.   ADNEXA: No masses or tenderness.    PROCEDURES:  Pap smear -- NOT INDICATED    Assessment:  Normal Gynecologic Exam  Vaginal discharge  UTI symptoms    Recommendations routine screening and no risk factors:  Mammogram at age 40  Colonoscopy age 45  Bone density age 65  Return to clinic as needed for any problems.  Return to clinic in one year. It is recommended to have a physician breast exam and pelvic exam annually. This is different than doing a Pap smear.         As of April 1, 2021, the Cures Act has been passed nationally. This new law requires that all doctors progress notes, lab results, pathology reports and radiology reports be released IMMEDIATELY to the patient in the patient portal. That means that the results are released to you at the EXACT same time they are released to me. Therefore, with all of the patients that I have I am not able to reply to each patient exactly when the results come in. So there will be a delay from when you see the results to when I see them and have time to come up with a response to send you. Also I only see these results when I am on the computer at work. So if the results come in over the weekend or after 5 pm of a work day, I will not see them until the next business day. As you can tell, this is a challenge as a physician to give every patient the quick response they hope for and deserve. So please be patient!     Thanks for understanding,

## 2025-05-17 LAB
BACTERIA UR CULT: NO GROWTH
BACTERIAL VAGINOSIS DNA (OHS): NOT DETECTED
CANDIDA GLABRATA/KRUSEI DNA (OHS): NOT DETECTED
CANDIDA SPECIES DNA (OHS): NOT DETECTED
TRICHOMONAS VAGINALIS DNA (OHS): NOT DETECTED

## 2025-05-19 ENCOUNTER — RESULTS FOLLOW-UP (OUTPATIENT)
Dept: OBSTETRICS AND GYNECOLOGY | Facility: CLINIC | Age: 70
End: 2025-05-19

## 2025-06-05 RX ORDER — MELOXICAM 15 MG/1
TABLET ORAL
Qty: 90 TABLET | Refills: 0 | Status: SHIPPED | OUTPATIENT
Start: 2025-06-05

## 2025-06-06 DIAGNOSIS — E03.9 ACQUIRED HYPOTHYROIDISM: ICD-10-CM

## 2025-06-06 RX ORDER — LEVOTHYROXINE SODIUM 88 UG/1
88 TABLET ORAL
Qty: 90 TABLET | Refills: 1 | Status: SHIPPED | OUTPATIENT
Start: 2025-06-06

## 2025-07-14 ENCOUNTER — OFFICE VISIT (OUTPATIENT)
Dept: FAMILY MEDICINE | Facility: CLINIC | Age: 70
End: 2025-07-14
Attending: FAMILY MEDICINE
Payer: MEDICARE

## 2025-07-14 ENCOUNTER — LAB VISIT (OUTPATIENT)
Dept: LAB | Facility: HOSPITAL | Age: 70
End: 2025-07-14
Attending: FAMILY MEDICINE
Payer: MEDICARE

## 2025-07-14 VITALS
HEART RATE: 70 BPM | DIASTOLIC BLOOD PRESSURE: 80 MMHG | SYSTOLIC BLOOD PRESSURE: 132 MMHG | BODY MASS INDEX: 28.03 KG/M2 | OXYGEN SATURATION: 98 % | WEIGHT: 201 LBS

## 2025-07-14 DIAGNOSIS — R79.9 ABNORMAL FINDING OF BLOOD CHEMISTRY, UNSPECIFIED: ICD-10-CM

## 2025-07-14 DIAGNOSIS — Z00.00 ANNUAL PHYSICAL EXAM: Primary | ICD-10-CM

## 2025-07-14 DIAGNOSIS — Z00.00 ANNUAL PHYSICAL EXAM: ICD-10-CM

## 2025-07-14 DIAGNOSIS — E03.9 ACQUIRED HYPOTHYROIDISM: ICD-10-CM

## 2025-07-14 PROBLEM — N18.31 STAGE 3A CHRONIC KIDNEY DISEASE: Status: RESOLVED | Noted: 2019-05-21 | Resolved: 2025-07-14

## 2025-07-14 PROBLEM — N18.31 STAGE 3A CHRONIC KIDNEY DISEASE: Status: ACTIVE | Noted: 2019-05-21

## 2025-07-14 LAB
ABSOLUTE EOSINOPHIL (OHS): 0.18 K/UL
ABSOLUTE MONOCYTE (OHS): 0.73 K/UL (ref 0.3–1)
ABSOLUTE NEUTROPHIL COUNT (OHS): 6.72 K/UL (ref 1.8–7.7)
ALBUMIN SERPL BCP-MCNC: 4.3 G/DL (ref 3.5–5.2)
ALBUMIN/CREAT UR: NORMAL
ALP SERPL-CCNC: 69 UNIT/L (ref 40–150)
ALT SERPL W/O P-5'-P-CCNC: 17 UNIT/L (ref 10–44)
ANION GAP (OHS): 9 MMOL/L (ref 8–16)
AST SERPL-CCNC: 17 UNIT/L (ref 11–45)
BASOPHILS # BLD AUTO: 0.04 K/UL
BASOPHILS NFR BLD AUTO: 0.4 %
BILIRUB SERPL-MCNC: 0.3 MG/DL (ref 0.1–1)
BUN SERPL-MCNC: 19 MG/DL (ref 8–23)
CALCIUM SERPL-MCNC: 9.6 MG/DL (ref 8.7–10.5)
CHLORIDE SERPL-SCNC: 105 MMOL/L (ref 95–110)
CO2 SERPL-SCNC: 26 MMOL/L (ref 23–29)
CREAT SERPL-MCNC: 1 MG/DL (ref 0.5–1.4)
CREAT UR-MCNC: 61 MG/DL (ref 15–325)
ERYTHROCYTE [DISTWIDTH] IN BLOOD BY AUTOMATED COUNT: 13.2 % (ref 11.5–14.5)
GFR SERPLBLD CREATININE-BSD FMLA CKD-EPI: 60 ML/MIN/1.73/M2
GLUCOSE SERPL-MCNC: 88 MG/DL (ref 70–110)
HCT VFR BLD AUTO: 46.2 % (ref 37–48.5)
HGB BLD-MCNC: 15.1 GM/DL (ref 12–16)
IMM GRANULOCYTES # BLD AUTO: 0.03 K/UL (ref 0–0.04)
IMM GRANULOCYTES NFR BLD AUTO: 0.3 % (ref 0–0.5)
LYMPHOCYTES # BLD AUTO: 2.02 K/UL (ref 1–4.8)
MCH RBC QN AUTO: 30.6 PG (ref 27–31)
MCHC RBC AUTO-ENTMCNC: 32.7 G/DL (ref 32–36)
MCV RBC AUTO: 94 FL (ref 82–98)
MICROALBUMIN UR-MCNC: <5 UG/ML (ref ?–5000)
NUCLEATED RBC (/100WBC) (OHS): 0 /100 WBC
PLATELET # BLD AUTO: 332 K/UL (ref 150–450)
PMV BLD AUTO: 9.4 FL (ref 9.2–12.9)
POTASSIUM SERPL-SCNC: 4.8 MMOL/L (ref 3.5–5.1)
PROT SERPL-MCNC: 7.2 GM/DL (ref 6–8.4)
RBC # BLD AUTO: 4.94 M/UL (ref 4–5.4)
RELATIVE EOSINOPHIL (OHS): 1.9 %
RELATIVE LYMPHOCYTE (OHS): 20.8 % (ref 18–48)
RELATIVE MONOCYTE (OHS): 7.5 % (ref 4–15)
RELATIVE NEUTROPHIL (OHS): 69.1 % (ref 38–73)
SODIUM SERPL-SCNC: 140 MMOL/L (ref 136–145)
TSH SERPL-ACNC: 0.6 UIU/ML (ref 0.4–4)
WBC # BLD AUTO: 9.72 K/UL (ref 3.9–12.7)

## 2025-07-14 PROCEDURE — 3008F BODY MASS INDEX DOCD: CPT | Mod: CPTII,S$GLB,, | Performed by: FAMILY MEDICINE

## 2025-07-14 PROCEDURE — 3075F SYST BP GE 130 - 139MM HG: CPT | Mod: CPTII,S$GLB,, | Performed by: FAMILY MEDICINE

## 2025-07-14 PROCEDURE — 36415 COLL VENOUS BLD VENIPUNCTURE: CPT | Mod: PO

## 2025-07-14 PROCEDURE — 80053 COMPREHEN METABOLIC PANEL: CPT

## 2025-07-14 PROCEDURE — 1101F PT FALLS ASSESS-DOCD LE1/YR: CPT | Mod: CPTII,S$GLB,, | Performed by: FAMILY MEDICINE

## 2025-07-14 PROCEDURE — 3079F DIAST BP 80-89 MM HG: CPT | Mod: CPTII,S$GLB,, | Performed by: FAMILY MEDICINE

## 2025-07-14 PROCEDURE — 1159F MED LIST DOCD IN RCRD: CPT | Mod: CPTII,S$GLB,, | Performed by: FAMILY MEDICINE

## 2025-07-14 PROCEDURE — 82043 UR ALBUMIN QUANTITATIVE: CPT | Performed by: FAMILY MEDICINE

## 2025-07-14 PROCEDURE — 1125F AMNT PAIN NOTED PAIN PRSNT: CPT | Mod: CPTII,S$GLB,, | Performed by: FAMILY MEDICINE

## 2025-07-14 PROCEDURE — 83036 HEMOGLOBIN GLYCOSYLATED A1C: CPT

## 2025-07-14 PROCEDURE — 85025 COMPLETE CBC W/AUTO DIFF WBC: CPT

## 2025-07-14 PROCEDURE — 84443 ASSAY THYROID STIM HORMONE: CPT

## 2025-07-14 PROCEDURE — 99214 OFFICE O/P EST MOD 30 MIN: CPT | Mod: S$GLB,,, | Performed by: FAMILY MEDICINE

## 2025-07-14 PROCEDURE — 3288F FALL RISK ASSESSMENT DOCD: CPT | Mod: CPTII,S$GLB,, | Performed by: FAMILY MEDICINE

## 2025-07-14 PROCEDURE — 99999 PR PBB SHADOW E&M-EST. PATIENT-LVL III: CPT | Mod: PBBFAC,,, | Performed by: FAMILY MEDICINE

## 2025-07-14 NOTE — PROGRESS NOTES
Subjective:       Patient ID: Sara David is a 70 y.o. female.    Chief Complaint: Annual Exam    HPI  Pt is here for annual exam pt is generally well no sob/cp cough chest congestion sore throat uri symptoms  Pt denies dysuria hematuria no vaginal bleeding  Pt denies n/v/f/c/d/c no change in bowel habits no brbpr  Pt has hypothyroid no temp intolerance however pt did have an isolated episode of palpitations with light headedness  She was in a car not driving not sure if it was a panic attack but pt did feel uneasy   it has not happened in the past and has not happened since its been a couple of weeks   Review of Systems   Constitutional:  Negative for activity change, chills, diaphoresis, fatigue and fever.   HENT:  Negative for congestion, ear discharge, ear pain, hearing loss, postnasal drip, rhinorrhea, sinus pressure, sneezing, sore throat, trouble swallowing and voice change.    Eyes:  Negative for photophobia, discharge, redness, itching and visual disturbance.   Respiratory:  Negative for cough, chest tightness, shortness of breath and wheezing.    Cardiovascular:  Positive for palpitations. Negative for chest pain and leg swelling.   Gastrointestinal:  Negative for abdominal pain, anal bleeding, blood in stool, constipation, diarrhea, nausea, rectal pain and vomiting.   Genitourinary:  Negative for dyspareunia, dysuria, flank pain, frequency, hematuria, menstrual problem, pelvic pain, urgency, vaginal bleeding and vaginal discharge.   Musculoskeletal:  Negative for arthralgias, back pain, joint swelling and neck pain.   Skin:  Negative for color change and rash.   Neurological:  Negative for dizziness, speech difficulty, weakness, light-headedness, numbness and headaches.   Hematological:  Does not bruise/bleed easily.   Psychiatric/Behavioral:  Negative for agitation, confusion, decreased concentration, sleep disturbance and suicidal ideas. The patient is not nervous/anxious.        Objective:     /80   Pulse 70   Wt 91.2 kg (201 lb)   LMP 01/17/2020   SpO2 98%   BMI 28.03 kg/m²     Physical Exam  Constitutional:       Appearance: Normal appearance. She is well-developed. She is not ill-appearing.   HENT:      Head: Normocephalic and atraumatic.      Right Ear: External ear normal.      Left Ear: External ear normal.      Nose: Nose normal.   Eyes:      General:         Right eye: No discharge.         Left eye: No discharge.      Conjunctiva/sclera: Conjunctivae normal.      Pupils: Pupils are equal, round, and reactive to light.   Neck:      Thyroid: No thyromegaly.   Cardiovascular:      Rate and Rhythm: Normal rate and regular rhythm.      Heart sounds: Normal heart sounds. No murmur heard.     No friction rub. No gallop.   Pulmonary:      Effort: Pulmonary effort is normal.      Breath sounds: Normal breath sounds. No wheezing or rales.   Abdominal:      General: Bowel sounds are normal. There is no distension.      Palpations: Abdomen is soft.      Tenderness: There is no abdominal tenderness. There is no guarding or rebound.   Genitourinary:     Vagina: Normal.   Musculoskeletal:         General: No tenderness. Normal range of motion.      Cervical back: Normal range of motion and neck supple.   Lymphadenopathy:      Cervical: No cervical adenopathy.   Skin:     General: Skin is warm and dry.      Findings: No erythema or rash.   Neurological:      General: No focal deficit present.      Mental Status: She is alert and oriented to person, place, and time.      Cranial Nerves: No cranial nerve deficit.      Motor: No abnormal muscle tone.      Coordination: Coordination normal.   Psychiatric:         Behavior: Behavior normal.         Thought Content: Thought content normal.         Judgment: Judgment normal.         Assessment:       1. Annual physical exam    2. Acquired hypothyroidism    3. Abnormal finding of blood chemistry, unspecified        Plan:     Orders cmp cbc lipid tsh urine hgb  "A1c  Cont meds  D/c caffeine etoh  Increase water intake  Graded exercise  Rtc 6 months  F/u cards if needed    Health maintenance  Discussed with pt        "This note will not be shared with the patient."     "

## 2025-07-15 LAB
EAG (OHS): 94 MG/DL (ref 68–131)
HBA1C MFR BLD: 4.9 % (ref 4–5.6)

## 2025-07-21 ENCOUNTER — HOSPITAL ENCOUNTER (OUTPATIENT)
Dept: RADIOLOGY | Facility: OTHER | Age: 70
Discharge: HOME | End: 2025-07-21
Attending: FAMILY MEDICINE
Payer: MEDICARE

## 2025-07-21 DIAGNOSIS — R79.9 ABNORMAL FINDING OF BLOOD CHEMISTRY, UNSPECIFIED: ICD-10-CM

## 2025-07-21 DIAGNOSIS — E03.9 ACQUIRED HYPOTHYROIDISM: ICD-10-CM

## 2025-07-21 DIAGNOSIS — Z00.00 ANNUAL PHYSICAL EXAM: ICD-10-CM

## 2025-07-21 PROCEDURE — 71046 X-RAY EXAM CHEST 2 VIEWS: CPT | Mod: 26,,, | Performed by: RADIOLOGY

## 2025-07-21 PROCEDURE — 71046 X-RAY EXAM CHEST 2 VIEWS: CPT | Mod: TC

## (undated) DEVICE — ELECTRODE ELECSURG 5MMX32CM

## (undated) DEVICE — DEVICE TRUCLEAR ULTRA MINI

## (undated) DEVICE — DRAPE LAPSCP CHOLE 122X102X78

## (undated) DEVICE — SET INFLOW TUBE HYSTER

## (undated) DEVICE — TIP RUMI KOH-EFFICIENT 3.0

## (undated) DEVICE — SET TRI-LUMEN FILTERED TUBE

## (undated) DEVICE — ENDOSTITCH POLYSORB 0 ES-9

## (undated) DEVICE — SYR 10CC LUER LOCK

## (undated) DEVICE — KIT WING PAD POSITIONING

## (undated) DEVICE — SEE MEDLINE ITEM 146313

## (undated) DEVICE — SYS SEE SHARP SCP ANTIFG LNG

## (undated) DEVICE — SOL POVIDONE SCRUB IODINE 4 OZ

## (undated) DEVICE — UNDERGLOVES BIOGEL PI SZ 7 LF

## (undated) DEVICE — SET CYSTO IRR DRP CHMBR 84IN

## (undated) DEVICE — SUT VICRYL+ 27 UR-6 VIOL

## (undated) DEVICE — PACK LAPAROSCOPY BAPTIST

## (undated) DEVICE — SPONGE LAP 18X18 PREWASHED

## (undated) DEVICE — NDL INSUFFLATION VERRES 120MM

## (undated) DEVICE — ELECTRODE REM PLYHSV RETURN 9

## (undated) DEVICE — TROCAR KII FIOS 5MM X 100MM

## (undated) DEVICE — GLOVE SENSICARE PI SURG 6.5

## (undated) DEVICE — GLOVE BIOGEL SKINSENSE PI 6.5

## (undated) DEVICE — DRAPE STERI LONG

## (undated) DEVICE — SEALER LIGASURE LAP 37CM 5MM

## (undated) DEVICE — SYR 50CC LL

## (undated) DEVICE — SUT EASE CROSSBOW CLSR SYS

## (undated) DEVICE — SOL POVIDONE PREP IODINE 4 OZ

## (undated) DEVICE — SUT MCRYL PLUS 4-0 PS2 27IN

## (undated) DEVICE — GOWN ECLIPSE REINF LVL4 TWL LG

## (undated) DEVICE — GLOVE SENSICARE PI GRN 6.5

## (undated) DEVICE — SOL IRR SOD CHL .9% POUR

## (undated) DEVICE — SCISSOR 5MMX35CM DIRECT DRIVE

## (undated) DEVICE — Device

## (undated) DEVICE — TOWEL OR DISP STRL BLUE 4/PK

## (undated) DEVICE — TRAY DRY SKIN SCRUB PREP

## (undated) DEVICE — PORT ACCESS 5MM W/120MM

## (undated) DEVICE — SOL PVP-I SCRUB 7.5% 4OZ

## (undated) DEVICE — ENDOSTITCH INSTRUMENT

## (undated) DEVICE — JELLY SURGILUBE 5GR

## (undated) DEVICE — SOL NORMAL USPCA 0.9%

## (undated) DEVICE — TROCAR KII FIOS 11MM X 100MM

## (undated) DEVICE — GOWN NONREINF SET-IN SLV XL

## (undated) DEVICE — TIP RUMI BLUE DISPOSABLE 5/BX

## (undated) DEVICE — PAD ABD 8X10 STERILE

## (undated) DEVICE — SOL BETADINE 5%